# Patient Record
Sex: FEMALE | Race: WHITE | NOT HISPANIC OR LATINO | Employment: OTHER | URBAN - METROPOLITAN AREA
[De-identification: names, ages, dates, MRNs, and addresses within clinical notes are randomized per-mention and may not be internally consistent; named-entity substitution may affect disease eponyms.]

---

## 2017-04-04 ENCOUNTER — OFFICE VISIT (OUTPATIENT)
Dept: FAMILY MEDICINE CLINIC | Facility: CLINIC | Age: 72
End: 2017-04-04

## 2017-04-04 VITALS
OXYGEN SATURATION: 96 % | BODY MASS INDEX: 26.13 KG/M2 | SYSTOLIC BLOOD PRESSURE: 140 MMHG | HEART RATE: 84 BPM | DIASTOLIC BLOOD PRESSURE: 70 MMHG | WEIGHT: 157 LBS

## 2017-04-04 DIAGNOSIS — N30.01 ACUTE CYSTITIS WITH HEMATURIA: ICD-10-CM

## 2017-04-04 DIAGNOSIS — R35.0 URINARY FREQUENCY: Primary | ICD-10-CM

## 2017-04-04 LAB
BILIRUB BLD-MCNC: NEGATIVE MG/DL
CLARITY, POC: ABNORMAL
COLOR UR: YELLOW
GLUCOSE UR STRIP-MCNC: NEGATIVE MG/DL
KETONES UR QL: NEGATIVE
LEUKOCYTE EST, POC: ABNORMAL
NITRITE UR-MCNC: NEGATIVE MG/ML
PH UR: 5 [PH] (ref 5–8)
PROT UR STRIP-MCNC: ABNORMAL MG/DL
RBC # UR STRIP: NEGATIVE /UL
SP GR UR: 1.01 (ref 1–1.03)
UROBILINOGEN UR QL: NORMAL

## 2017-04-04 PROCEDURE — 81002 URINALYSIS NONAUTO W/O SCOPE: CPT | Performed by: NURSE PRACTITIONER

## 2017-04-04 PROCEDURE — 99213 OFFICE O/P EST LOW 20 MIN: CPT | Performed by: NURSE PRACTITIONER

## 2017-04-04 RX ORDER — ECHINACEA PURPUREA EXTRACT 125 MG
TABLET ORAL
COMMUNITY
Start: 2017-03-11 | End: 2017-07-03

## 2017-04-04 RX ORDER — PHENAZOPYRIDINE HYDROCHLORIDE 100 MG/1
100 TABLET, FILM COATED ORAL 3 TIMES DAILY PRN
Qty: 6 TABLET | Refills: 0 | Status: SHIPPED | OUTPATIENT
Start: 2017-04-04 | End: 2017-04-26

## 2017-04-04 RX ORDER — PHENAZOPYRIDINE HYDROCHLORIDE 95 MG/1
TABLET ORAL
COMMUNITY
Start: 2017-03-11 | End: 2017-04-04

## 2017-04-04 RX ORDER — CA/D3/MAG OX/ZINC/COP/MANG/BOR 600 MG-800
1 TABLET,CHEWABLE ORAL DAILY
COMMUNITY

## 2017-04-04 NOTE — PROGRESS NOTES
Subjective   Gloria Álvarez is a 71 y.o. female.   Pt of Dr Ravi.    Chief Complaint   Patient presents with   • Urinary Tract Infection     Social History   Substance Use Topics   • Smoking status: Never Smoker   • Smokeless tobacco: None   • Alcohol use No       History of Present Illness   Here today for pelvic pain and follow up from [facility] for UTI.  Pelvic pain for several years, is intermittent and sharp, nothing will alleviate it.Has been seen by urology in the past.  Uti with dysuria and frequency that started over the weekend. The pain was intermittent and increased water intake helped alleviate the pain some.    Review of Systems   Genitourinary: Positive for frequency and pelvic pain.   All other systems reviewed and are negative.      Objective   Vitals:    04/04/17 1535   BP: 140/70   Pulse: 84   SpO2: 96%   Weight: 157 lb (71.2 kg)     Body mass index is 26.13 kg/(m^2).    Physical Exam   Constitutional: She appears well-developed and well-nourished. No distress.   HENT:   Head: Atraumatic.   Eyes: EOM are normal.   Cardiovascular: Normal rate and regular rhythm.    Pulmonary/Chest: Effort normal.   Abdominal: Soft. Bowel sounds are normal.   Mild tenderness to bilateral pelvic aspect.   Skin: Skin is warm.   Nursing note and vitals reviewed.    Urine clear today, discussed follow up with uro/gyn for her pelvic pain and she is in agreement.  Assessment/Plan   Problem List Items Addressed This Visit     None      Visit Diagnoses     Urinary frequency    -  Primary    Relevant Orders    POC Urinalysis Dipstick, Multipro

## 2017-05-02 ENCOUNTER — OFFICE VISIT (OUTPATIENT)
Dept: GASTROENTEROLOGY | Facility: CLINIC | Age: 72
End: 2017-05-02

## 2017-05-02 VITALS — HEIGHT: 64 IN | WEIGHT: 153.8 LBS | BODY MASS INDEX: 26.26 KG/M2

## 2017-05-02 DIAGNOSIS — K57.32 DIVERTICULITIS OF LARGE INTESTINE WITHOUT PERFORATION OR ABSCESS WITHOUT BLEEDING: ICD-10-CM

## 2017-05-02 DIAGNOSIS — D3A.026 CARCINOID TUMOR OF RECTUM: Primary | ICD-10-CM

## 2017-05-02 PROCEDURE — 99203 OFFICE O/P NEW LOW 30 MIN: CPT | Performed by: INTERNAL MEDICINE

## 2017-06-28 ENCOUNTER — TRANSCRIBE ORDERS (OUTPATIENT)
Dept: ADMINISTRATIVE | Facility: HOSPITAL | Age: 72
End: 2017-06-28

## 2017-06-28 DIAGNOSIS — C73 THYROID CANCER (HCC): Primary | ICD-10-CM

## 2017-07-05 ENCOUNTER — ANESTHESIA EVENT (OUTPATIENT)
Dept: GASTROENTEROLOGY | Facility: HOSPITAL | Age: 72
End: 2017-07-05

## 2017-07-05 ENCOUNTER — APPOINTMENT (OUTPATIENT)
Dept: ULTRASOUND IMAGING | Facility: HOSPITAL | Age: 72
End: 2017-07-05

## 2017-07-05 ENCOUNTER — ANESTHESIA (OUTPATIENT)
Dept: GASTROENTEROLOGY | Facility: HOSPITAL | Age: 72
End: 2017-07-05

## 2017-07-05 ENCOUNTER — HOSPITAL ENCOUNTER (OUTPATIENT)
Facility: HOSPITAL | Age: 72
Setting detail: HOSPITAL OUTPATIENT SURGERY
Discharge: HOME | End: 2017-07-05
Attending: INTERNAL MEDICINE | Admitting: INTERNAL MEDICINE

## 2017-07-05 VITALS
RESPIRATION RATE: 16 BRPM | HEART RATE: 74 BPM | DIASTOLIC BLOOD PRESSURE: 67 MMHG | TEMPERATURE: 97.9 F | BODY MASS INDEX: 24.73 KG/M2 | SYSTOLIC BLOOD PRESSURE: 126 MMHG | WEIGHT: 148.44 LBS | OXYGEN SATURATION: 97 % | HEIGHT: 65 IN

## 2017-07-05 PROBLEM — Z86.012 HX OF BENIGN CARCINOID TUMOR: Status: ACTIVE | Noted: 2017-07-05

## 2017-07-05 PROBLEM — Z87.19 HX OF DIVERTICULITIS OF COLON: Status: ACTIVE | Noted: 2017-07-05

## 2017-07-05 PROCEDURE — 25010000002 PROPOFOL 1000 MG/ML EMULSION: Performed by: ANESTHESIOLOGY

## 2017-07-05 PROCEDURE — 45378 DIAGNOSTIC COLONOSCOPY: CPT | Performed by: INTERNAL MEDICINE

## 2017-07-05 PROCEDURE — S0260 H&P FOR SURGERY: HCPCS | Performed by: INTERNAL MEDICINE

## 2017-07-05 PROCEDURE — 25010000002 PROPOFOL 10 MG/ML EMULSION: Performed by: ANESTHESIOLOGY

## 2017-07-05 RX ORDER — SODIUM CHLORIDE, SODIUM LACTATE, POTASSIUM CHLORIDE, CALCIUM CHLORIDE 600; 310; 30; 20 MG/100ML; MG/100ML; MG/100ML; MG/100ML
30 INJECTION, SOLUTION INTRAVENOUS CONTINUOUS PRN
Status: DISCONTINUED | OUTPATIENT
Start: 2017-07-05 | End: 2017-07-05 | Stop reason: HOSPADM

## 2017-07-05 RX ORDER — LIDOCAINE HYDROCHLORIDE 10 MG/ML
0.1 INJECTION, SOLUTION EPIDURAL; INFILTRATION; INTRACAUDAL; PERINEURAL ONCE AS NEEDED
Status: COMPLETED | OUTPATIENT
Start: 2017-07-05 | End: 2017-07-05

## 2017-07-05 RX ORDER — LIDOCAINE HYDROCHLORIDE 20 MG/ML
INJECTION, SOLUTION INFILTRATION; PERINEURAL AS NEEDED
Status: DISCONTINUED | OUTPATIENT
Start: 2017-07-05 | End: 2017-07-05 | Stop reason: SURG

## 2017-07-05 RX ORDER — PROPOFOL 10 MG/ML
VIAL (ML) INTRAVENOUS AS NEEDED
Status: DISCONTINUED | OUTPATIENT
Start: 2017-07-05 | End: 2017-07-05 | Stop reason: SURG

## 2017-07-05 RX ADMIN — LIDOCAINE HYDROCHLORIDE 40 MG: 20 INJECTION, SOLUTION INFILTRATION; PERINEURAL at 08:54

## 2017-07-05 RX ADMIN — PROPOFOL 40 MG: 10 INJECTION, EMULSION INTRAVENOUS at 09:04

## 2017-07-05 RX ADMIN — SODIUM CHLORIDE, POTASSIUM CHLORIDE, SODIUM LACTATE AND CALCIUM CHLORIDE 30 ML/HR: 600; 310; 30; 20 INJECTION, SOLUTION INTRAVENOUS at 07:36

## 2017-07-05 RX ADMIN — LIDOCAINE HYDROCHLORIDE 0.1 ML: 10 INJECTION, SOLUTION EPIDURAL; INFILTRATION; INTRACAUDAL; PERINEURAL at 07:37

## 2017-07-05 RX ADMIN — PROPOFOL 140 MG: 10 INJECTION, EMULSION INTRAVENOUS at 08:54

## 2017-07-05 RX ADMIN — PROPOFOL 140 MCG/KG/MIN: 10 INJECTION, EMULSION INTRAVENOUS at 08:54

## 2017-07-05 NOTE — DISCHARGE INSTRUCTIONS
For the next 24 hours patient needs to be with a responsible adult.    For 24 hours DO NOT drive, operate machinery, appliances, drink alcohol, make important decisions or sign legal documents.    Start with a light or bland diet and advance to regular diet as tolerated.    Follow recommendations on procedure report provided by your doctor.    Call Dr Lambert for problems [phone]    Problems may include but not limited to: large amounts of bleeding, trouble breathing, repeated vomiting, severe unrelieved pain, fever or chills.

## 2017-07-05 NOTE — ANESTHESIA POSTPROCEDURE EVALUATION
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

## 2017-07-05 NOTE — PLAN OF CARE
Problem: Patient Care Overview (Adult)  Goal: Plan of Care Review  Outcome: Ongoing (interventions implemented as appropriate)    07/05/17 0703   Coping/Psychosocial Response Interventions   Plan Of Care Reviewed With patient       Goal: Adult Individualization and Mutuality  Outcome: Ongoing (interventions implemented as appropriate)    07/05/17 0703   Individualization   Patient Specific Preferences none       Goal: Discharge Needs Assessment  Outcome: Ongoing (interventions implemented as appropriate)    07/05/17 0703   Discharge Needs Assessment   Concerns To Be Addressed no discharge needs identified   Readmission Within The Last 30 Days no previous admission in last 30 days   Equipment Needed After Discharge none   Current Health   Anticipated Changes Related to Illness none   Living Environment   Transportation Available car;family or friend will provide         Problem: GI Endoscopy (Adult)  Goal: Signs and Symptoms of Listed Potential Problems Will be Absent or Manageable (GI Endoscopy)  Outcome: Ongoing (interventions implemented as appropriate)    07/05/17 0703   GI Endoscopy   Problems Assessed (GI Endoscopy) bleeding;pain

## 2017-07-05 NOTE — H&P
[facility]  Pre Procedure History & Physical    Chief Complaint:   History diverticulitis    Subjective     HPI:    71-year-old female with history of carcinoma of the thyroid as well as carcinoid of the rectum presenting status post acute diverticulitis.  Patient now here for follow-up.  Patient last colonoscopy 2012.  He shouldn't here for colonoscopy surveillance.    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   • Osteoporosis    • Papillary carcinoma of thyroid 2012   • Personal history of asthma        Past Surgical History:  Past Surgical History:   Procedure Laterality Date   • CATARACT EXTRACTION Bilateral    • CHOLECYSTECTOMY  2003   • COLONOSCOPY  04/2012    complete   • RECTAL TUMOR BY PROCTOTOMY EXCISION      CARCINOID   • TOTAL THYROIDECTOMY      7/3/12 ESTEFANIA / DR DEE/ 3CM PAP MET 1/9 NODULE RIGHT LOBE / LEFT LOBE -       Family History:  Family History   Problem Relation Age of Onset   • Hypertension Mother    • Hypertension Brother    • Malig Hyperthermia Neg Hx        Social History:   reports that she has never smoked. She has never used smokeless tobacco. She reports that she does not drink alcohol or use illicit drugs.    Medications:   Prescriptions Prior to Admission   Medication Sig Dispense Refill Last Dose   • levothyroxine (SYNTHROID, LEVOTHROID) 150 MCG tablet Take 150 mcg by mouth Daily.   7/4/2017 at Unknown time   • SENNA LAXATIVE 25 MG tablet Take 1 tablet by mouth As Needed.   Past Month at Unknown time   • aspirin 81 MG tablet Take 1 tablet by mouth daily.   6/27/2017   • Calcium Carbonate-Vit D-Min (CALTRATE 600+D PLUS MINERALS) 600-800 MG-UNIT chewable tablet Chew 1 tablet Daily.   6/28/2017   • Lutein 6 MG capsule Take 6 mg by mouth Daily. Take as directed.   6/28/2017   • vitamin E 400 UNIT capsule Take 400  "Units by mouth Daily.   6/28/2017       Allergies:  Ciprofloxacin; Sulfa antibiotics; Varicella virus vaccine live; and Zoster vaccine live    ROS:    Pertinent items are noted in HPI     Objective     Blood pressure 148/73, pulse 87, temperature 97.9 °F (36.6 °C), temperature source Oral, resp. rate 16, height 65\" (165.1 cm), weight 148 lb 7 oz (67.3 kg), SpO2 100 %.    Physical Exam   Constitutional: Pt is oriented to person, place, and time and well-developed, well-nourished, and in no distress.   Mouth/Throat: Oropharynx is clear and moist.   Neck: Normal range of motion.   Cardiovascular: Normal rate, regular rhythm and normal heart sounds.    Pulmonary/Chest: Effort normal and breath sounds normal.   Abdominal: Soft. Nontender  Skin: Skin is warm and dry.   Psychiatric: Mood, memory, affect and judgment normal.     Assessment/Plan     Diagnosis:  History diverticulitis  History carcinoid of the rectum    Anticipated Surgical Procedure:  Colonoscopy    The risks, benefits, and alternatives of this procedure have been discussed with the patient or the responsible party- the patient understands and agrees to proceed.                                                          "

## 2017-07-05 NOTE — ANESTHESIA PREPROCEDURE EVALUATION
Anesthesia Evaluation     Patient summary reviewed   NPO Solid Status: > 6 hours  NPO Liquid Status: > 6 hours     Airway   Mallampati: II  TM distance: >3 FB  Dental      Pulmonary    Cardiovascular     Rhythm: regular  Rate: normal        Neuro/Psych  GI/Hepatic/Renal/Endo    (+)  hypothyroidism,     Musculoskeletal     Abdominal    Substance History      OB/GYN          Other      history of cancer remission                                    Anesthesia Plan    ASA 2     MAC     Anesthetic plan and risks discussed with patient.

## 2017-07-05 NOTE — H&P
"[facility]  Pre Procedure History & Physical    Chief Complaint:   Time for my colonoscopy    Subjective     HPI:   71 y.o. 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 She has never used smokeless tobacco. She reports that she does not drink alcohol or use illicit drugs.    Medications:   Prescriptions Prior to Admission   Medication Sig Dispense Refill Last Dose   • levothyroxine (SYNTHROID, LEVOTHROID) 150 MCG tablet Take 150 mcg by mouth Daily.   7/4/2017 at Unknown time   • SENNA LAXATIVE 25 MG tablet Take 1 tablet by mouth As Needed.   Past Month at Unknown time   • aspirin 81 MG tablet Take 1 tablet by mouth daily.   6/27/2017   • Calcium Carbonate-Vit D-Min (CALTRATE 600+D PLUS MINERALS) 600-800 MG-UNIT chewable tablet Chew 1 tablet Daily.   6/28/2017   • Lutein 6 MG capsule Take 6 mg by mouth Daily. Take as directed.   6/28/2017   • vitamin E 400 UNIT capsule Take 400 Units by mouth Daily.   6/28/2017       Allergies:  Ciprofloxacin; Sulfa antibiotics; Varicella virus vaccine live; and Zoster vaccine live    ROS:    Pertinent items are noted in HPI     Objective     Blood pressure 148/73, pulse 87, temperature 97.9 °F (36.6 °C), temperature source Oral, resp. rate 16, height 65\" (165.1 cm), weight 148 lb 7 oz (67.3 kg), SpO2 100 %.    Physical Exam   Constitutional: Pt is oriented to person, place, and time and well-developed, well-nourished, and in no distress.   HENT:   Mouth/Throat: Oropharynx is clear and moist.   Neck: Normal " range of motion. Neck supple.   Cardiovascular: Normal rate, regular rhythm and normal heart sounds.    Pulmonary/Chest: Effort normal and breath sounds normal. No respiratory distress. No  wheezes.   Abdominal: Soft. Bowel sounds are normal.   Skin: Skin is warm and dry.   Psychiatric: Mood, memory, affect and judgment normal.     Assessment/Plan     Diagnosis:  Encounter for screening for colon cancer    Anticipated Surgical Procedure:  Colonoscopy    The risks, benefits, and alternatives of this procedure have been discussed with the patient or the responsible party- the patient understands and agrees to proceed.

## 2017-07-05 NOTE — BRIEF OP NOTE
COLONOSCOPY  Procedure Note    Gloria Álvarez  7/5/2017    Pre-op Diagnosis:   Diverticulitis of large intestine without perforation or abscess without bleeding [K57.32]  Carcinoid tumor of rectum [D3A.026]    Post-op Diagnosis:     Post-Op Diagnosis Codes:     * Diverticulosis [K57.90]     * Internal hemorrhoids [K64.8]    Procedure/CPT® Codes:      Procedure(s):  COLONOSCOPY TO CECUM    Surgeon(s):  Andres Lambert MD    Anesthesia: Monitor Anesthesia Care    Staff:   Endo Technician: Erma Schultz  Endo Nurse: Nico Ramírez RN    Estimated Blood Loss: *No blood loss documented*  Urine Voided: * No values recorded between 7/5/2017  8:46 AM and 7/5/2017  9:20 AM *    Specimens:                * No specimens in log *      Drains:    na       Findings: Diverticulosis  Internal hemorrhoids  History rectosigmoid resection    Complications: None      Andres Lambert MD     Date: 7/5/2017  Time: 9:21 AM

## 2017-07-06 ENCOUNTER — HOSPITAL ENCOUNTER (OUTPATIENT)
Dept: ULTRASOUND IMAGING | Facility: HOSPITAL | Age: 72
Discharge: HOME | End: 2017-07-06
Admitting: INTERNAL MEDICINE

## 2017-07-06 DIAGNOSIS — C73 THYROID CANCER (HCC): ICD-10-CM

## 2017-07-06 PROCEDURE — 76536 US EXAM OF HEAD AND NECK: CPT

## 2017-11-03 ENCOUNTER — APPOINTMENT (OUTPATIENT)
Dept: WOMENS IMAGING | Facility: HOSPITAL | Age: 72
End: 2017-11-03

## 2017-11-03 PROCEDURE — 77080 DXA BONE DENSITY AXIAL: CPT | Performed by: RADIOLOGY

## 2017-11-03 PROCEDURE — 77063 BREAST TOMOSYNTHESIS BI: CPT | Performed by: RADIOLOGY

## 2017-11-03 PROCEDURE — G0202 SCR MAMMO BI INCL CAD: HCPCS | Performed by: RADIOLOGY

## 2017-11-10 PROBLEM — M81.0 AGE-RELATED OSTEOPOROSIS WITHOUT CURRENT PATHOLOGICAL FRACTURE: Status: ACTIVE | Noted: 2017-11-10

## 2017-12-26 ENCOUNTER — APPOINTMENT (OUTPATIENT)
Dept: GENERAL RADIOLOGY | Facility: HOSPITAL | Age: 72
End: 2017-12-26

## 2017-12-26 ENCOUNTER — HOSPITAL ENCOUNTER (INPATIENT)
Facility: HOSPITAL | Age: 72
LOS: 2 days | Discharge: HOME HEALTH | End: 2017-12-28
Attending: EMERGENCY MEDICINE | Admitting: INTERNAL MEDICINE

## 2017-12-26 DIAGNOSIS — R26.2 DIFFICULTY WALKING: ICD-10-CM
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
## 2017-12-26 DIAGNOSIS — S72.012A CLOSED SUBCAPITAL FRACTURE OF LEFT FEMUR, INITIAL ENCOUNTER (HCC): Primary | ICD-10-CM

## 2017-12-26 PROBLEM — E87.6 HYPOKALEMIA: Status: ACTIVE | Noted: 2017-12-26

## 2017-12-26 LAB
ALBUMIN SERPL-MCNC: 4.1 G/DL (ref 3.5–5.2)
ALBUMIN/GLOB SERPL: 1.2 G/DL
ALP SERPL-CCNC: 90 U/L (ref 39–117)
ALT SERPL W P-5'-P-CCNC: 15 U/L (ref 1–33)
ANION GAP SERPL CALCULATED.3IONS-SCNC: 12.8 MMOL/L
APTT PPP: 28.8 SECONDS (ref 22.7–35.4)
AST SERPL-CCNC: 16 U/L (ref 1–32)
BASOPHILS # BLD AUTO: 0.02 10*3/MM3 (ref 0–0.2)
BASOPHILS NFR BLD AUTO: 0.1 % (ref 0–1.5)
BILIRUB SERPL-MCNC: 0.4 MG/DL (ref 0.1–1.2)
BUN BLD-MCNC: 12 MG/DL (ref 8–23)
BUN/CREAT SERPL: 15.4 (ref 7–25)
CALCIUM SPEC-SCNC: 9 MG/DL (ref 8.6–10.5)
CHLORIDE SERPL-SCNC: 103 MMOL/L (ref 98–107)
CO2 SERPL-SCNC: 24.2 MMOL/L (ref 22–29)
CREAT BLD-MCNC: 0.78 MG/DL (ref 0.57–1)
DEPRECATED RDW RBC AUTO: 40 FL (ref 37–54)
EOSINOPHIL # BLD AUTO: 0.03 10*3/MM3 (ref 0–0.7)
EOSINOPHIL NFR BLD AUTO: 0.2 % (ref 0.3–6.2)
ERYTHROCYTE [DISTWIDTH] IN BLOOD BY AUTOMATED COUNT: 12.3 % (ref 11.7–13)
GFR SERPL CREATININE-BSD FRML MDRD: 72 ML/MIN/1.73
GLOBULIN UR ELPH-MCNC: 3.4 GM/DL
GLUCOSE BLD-MCNC: 98 MG/DL (ref 65–99)
HCT VFR BLD AUTO: 40.2 % (ref 35.6–45.5)
HGB BLD-MCNC: 13.4 G/DL (ref 11.9–15.5)
IMM GRANULOCYTES # BLD: 0.08 10*3/MM3 (ref 0–0.03)
IMM GRANULOCYTES NFR BLD: 0.5 % (ref 0–0.5)
INR PPP: 1.02 (ref 0.9–1.1)
LYMPHOCYTES # BLD AUTO: 1.43 10*3/MM3 (ref 0.9–4.8)
LYMPHOCYTES NFR BLD AUTO: 8 % (ref 19.6–45.3)
MCH RBC QN AUTO: 29.9 PG (ref 26.9–32)
MCHC RBC AUTO-ENTMCNC: 33.3 G/DL (ref 32.4–36.3)
MCV RBC AUTO: 89.7 FL (ref 80.5–98.2)
MONOCYTES # BLD AUTO: 0.97 10*3/MM3 (ref 0.2–1.2)
MONOCYTES NFR BLD AUTO: 5.5 % (ref 5–12)
NEUTROPHILS # BLD AUTO: 15.24 10*3/MM3 (ref 1.9–8.1)
NEUTROPHILS NFR BLD AUTO: 85.7 % (ref 42.7–76)
PLATELET # BLD AUTO: 264 10*3/MM3 (ref 140–500)
PMV BLD AUTO: 10 FL (ref 6–12)
POTASSIUM BLD-SCNC: 3.4 MMOL/L (ref 3.5–5.2)
PROT SERPL-MCNC: 7.5 G/DL (ref 6–8.5)
PROTHROMBIN TIME: 13 SECONDS (ref 11.7–14.2)
RBC # BLD AUTO: 4.48 10*6/MM3 (ref 3.9–5.2)
SODIUM BLD-SCNC: 140 MMOL/L (ref 136–145)
WBC NRBC COR # BLD: 17.77 10*3/MM3 (ref 4.5–10.7)

## 2017-12-26 PROCEDURE — 85610 PROTHROMBIN TIME: CPT | Performed by: EMERGENCY MEDICINE

## 2017-12-26 PROCEDURE — 25010000002 HYDROMORPHONE PER 4 MG: Performed by: EMERGENCY MEDICINE

## 2017-12-26 PROCEDURE — 25810000003 SODIUM CHLORIDE 0.9 % WITH KCL 20 MEQ 20-0.9 MEQ/L-% SOLUTION: Performed by: INTERNAL MEDICINE

## 2017-12-26 PROCEDURE — 25010000002 ONDANSETRON PER 1 MG: Performed by: EMERGENCY MEDICINE

## 2017-12-26 PROCEDURE — 99284 EMERGENCY DEPT VISIT MOD MDM: CPT

## 2017-12-26 PROCEDURE — 85025 COMPLETE CBC W/AUTO DIFF WBC: CPT | Performed by: EMERGENCY MEDICINE

## 2017-12-26 PROCEDURE — 85730 THROMBOPLASTIN TIME PARTIAL: CPT | Performed by: EMERGENCY MEDICINE

## 2017-12-26 PROCEDURE — 80053 COMPREHEN METABOLIC PANEL: CPT | Performed by: EMERGENCY MEDICINE

## 2017-12-26 PROCEDURE — 73502 X-RAY EXAM HIP UNI 2-3 VIEWS: CPT

## 2017-12-26 PROCEDURE — 93005 ELECTROCARDIOGRAM TRACING: CPT | Performed by: EMERGENCY MEDICINE

## 2017-12-26 PROCEDURE — 71010 HC CHEST PA OR AP: CPT

## 2017-12-26 PROCEDURE — 93010 ELECTROCARDIOGRAM REPORT: CPT | Performed by: INTERNAL MEDICINE

## 2017-12-26 RX ORDER — HYDROCODONE BITARTRATE AND ACETAMINOPHEN 7.5; 325 MG/1; MG/1
1 TABLET ORAL EVERY 4 HOURS PRN
Status: DISCONTINUED | OUTPATIENT
Start: 2017-12-26 | End: 2017-12-27 | Stop reason: HOSPADM

## 2017-12-26 RX ORDER — SENNOSIDES 8.6 MG
1 TABLET ORAL 2 TIMES DAILY
Status: DISCONTINUED | OUTPATIENT
Start: 2017-12-26 | End: 2017-12-27 | Stop reason: HOSPADM

## 2017-12-26 RX ORDER — LEVOTHYROXINE SODIUM 0.15 MG/1
150 TABLET ORAL EVERY MORNING
Status: DISCONTINUED | OUTPATIENT
Start: 2017-12-27 | End: 2017-12-27 | Stop reason: HOSPADM

## 2017-12-26 RX ORDER — VITAMIN E 268 MG
400 CAPSULE ORAL DAILY
Status: DISCONTINUED | OUTPATIENT
Start: 2017-12-26 | End: 2017-12-27 | Stop reason: HOSPADM

## 2017-12-26 RX ORDER — SODIUM CHLORIDE 0.9 % (FLUSH) 0.9 %
1-10 SYRINGE (ML) INJECTION AS NEEDED
Status: DISCONTINUED | OUTPATIENT
Start: 2017-12-26 | End: 2017-12-27 | Stop reason: HOSPADM

## 2017-12-26 RX ORDER — ACETAMINOPHEN 325 MG/1
650 TABLET ORAL EVERY 4 HOURS PRN
Status: DISCONTINUED | OUTPATIENT
Start: 2017-12-26 | End: 2017-12-27 | Stop reason: HOSPADM

## 2017-12-26 RX ORDER — SODIUM CHLORIDE 0.9 % (FLUSH) 0.9 %
10 SYRINGE (ML) INJECTION AS NEEDED
Status: DISCONTINUED | OUTPATIENT
Start: 2017-12-26 | End: 2017-12-27 | Stop reason: HOSPADM

## 2017-12-26 RX ORDER — SODIUM CHLORIDE AND POTASSIUM CHLORIDE 150; 900 MG/100ML; MG/100ML
75 INJECTION, SOLUTION INTRAVENOUS CONTINUOUS
Status: DISCONTINUED | OUTPATIENT
Start: 2017-12-26 | End: 2017-12-27 | Stop reason: HOSPADM

## 2017-12-26 RX ORDER — ONDANSETRON 2 MG/ML
4 INJECTION INTRAMUSCULAR; INTRAVENOUS EVERY 6 HOURS PRN
Status: DISCONTINUED | OUTPATIENT
Start: 2017-12-26 | End: 2017-12-27 | Stop reason: HOSPADM

## 2017-12-26 RX ORDER — HYDROMORPHONE HYDROCHLORIDE 1 MG/ML
0.5 INJECTION, SOLUTION INTRAMUSCULAR; INTRAVENOUS; SUBCUTANEOUS
Status: DISCONTINUED | OUTPATIENT
Start: 2017-12-26 | End: 2017-12-27 | Stop reason: HOSPADM

## 2017-12-26 RX ORDER — ONDANSETRON 2 MG/ML
4 INJECTION INTRAMUSCULAR; INTRAVENOUS ONCE
Status: COMPLETED | OUTPATIENT
Start: 2017-12-26 | End: 2017-12-26

## 2017-12-26 RX ADMIN — POTASSIUM CHLORIDE AND SODIUM CHLORIDE 75 ML/HR: 900; 150 INJECTION, SOLUTION INTRAVENOUS at 22:08

## 2017-12-26 RX ADMIN — HYDROCODONE BITARTRATE AND ACETAMINOPHEN 1 TABLET: 7.5; 325 TABLET ORAL at 22:07

## 2017-12-26 RX ADMIN — HYDROMORPHONE HYDROCHLORIDE 1 MG: 2 INJECTION INTRAMUSCULAR; INTRAVENOUS; SUBCUTANEOUS at 16:11

## 2017-12-26 RX ADMIN — ONDANSETRON 4 MG: 2 INJECTION INTRAMUSCULAR; INTRAVENOUS at 16:11

## 2017-12-27 ENCOUNTER — APPOINTMENT (OUTPATIENT)
Dept: GENERAL RADIOLOGY | Facility: HOSPITAL | Age: 72
End: 2017-12-27

## 2017-12-27 ENCOUNTER — ANESTHESIA EVENT (OUTPATIENT)
Dept: PERIOP | Facility: HOSPITAL | Age: 72
End: 2017-12-27

## 2017-12-27 ENCOUNTER — ANESTHESIA (OUTPATIENT)
Dept: PERIOP | Facility: HOSPITAL | Age: 72
End: 2017-12-27

## 2017-12-27 LAB
ALBUMIN SERPL-MCNC: 3.3 G/DL (ref 3.5–5.2)
ALBUMIN/GLOB SERPL: 1 G/DL
ALP SERPL-CCNC: 75 U/L (ref 39–117)
ALT SERPL W P-5'-P-CCNC: 19 U/L (ref 1–33)
ANION GAP SERPL CALCULATED.3IONS-SCNC: 11.4 MMOL/L
AST SERPL-CCNC: 25 U/L (ref 1–32)
BACTERIA UR QL AUTO: ABNORMAL /HPF
BASOPHILS # BLD AUTO: 0.02 10*3/MM3 (ref 0–0.2)
BASOPHILS NFR BLD AUTO: 0.2 % (ref 0–1.5)
BILIRUB SERPL-MCNC: 0.7 MG/DL (ref 0.1–1.2)
BILIRUB UR QL STRIP: NEGATIVE
BUN BLD-MCNC: 11 MG/DL (ref 8–23)
BUN/CREAT SERPL: 13.1 (ref 7–25)
CALCIUM SPEC-SCNC: 8.1 MG/DL (ref 8.6–10.5)
CHLORIDE SERPL-SCNC: 105 MMOL/L (ref 98–107)
CLARITY UR: CLEAR
CO2 SERPL-SCNC: 23.6 MMOL/L (ref 22–29)
COLOR UR: YELLOW
CREAT BLD-MCNC: 0.84 MG/DL (ref 0.57–1)
DEPRECATED RDW RBC AUTO: 41.5 FL (ref 37–54)
EOSINOPHIL # BLD AUTO: 0.06 10*3/MM3 (ref 0–0.7)
EOSINOPHIL NFR BLD AUTO: 0.5 % (ref 0.3–6.2)
ERYTHROCYTE [DISTWIDTH] IN BLOOD BY AUTOMATED COUNT: 12.3 % (ref 11.7–13)
GFR SERPL CREATININE-BSD FRML MDRD: 66 ML/MIN/1.73
GLOBULIN UR ELPH-MCNC: 3.3 GM/DL
GLUCOSE BLD-MCNC: 99 MG/DL (ref 65–99)
GLUCOSE UR STRIP-MCNC: NEGATIVE MG/DL
HCT VFR BLD AUTO: 36.8 % (ref 35.6–45.5)
HGB BLD-MCNC: 12.2 G/DL (ref 11.9–15.5)
HGB UR QL STRIP.AUTO: NEGATIVE
HYALINE CASTS UR QL AUTO: ABNORMAL /LPF
IMM GRANULOCYTES # BLD: 0.02 10*3/MM3 (ref 0–0.03)
IMM GRANULOCYTES NFR BLD: 0.2 % (ref 0–0.5)
KETONES UR QL STRIP: NEGATIVE
LEUKOCYTE ESTERASE UR QL STRIP.AUTO: ABNORMAL
LYMPHOCYTES # BLD AUTO: 1.87 10*3/MM3 (ref 0.9–4.8)
LYMPHOCYTES NFR BLD AUTO: 16.5 % (ref 19.6–45.3)
MAGNESIUM SERPL-MCNC: 2.3 MG/DL (ref 1.6–2.4)
MCH RBC QN AUTO: 30.5 PG (ref 26.9–32)
MCHC RBC AUTO-ENTMCNC: 33.2 G/DL (ref 32.4–36.3)
MCV RBC AUTO: 92 FL (ref 80.5–98.2)
MONOCYTES # BLD AUTO: 1.53 10*3/MM3 (ref 0.2–1.2)
MONOCYTES NFR BLD AUTO: 13.5 % (ref 5–12)
NEUTROPHILS # BLD AUTO: 7.86 10*3/MM3 (ref 1.9–8.1)
NEUTROPHILS NFR BLD AUTO: 69.1 % (ref 42.7–76)
NITRITE UR QL STRIP: NEGATIVE
PH UR STRIP.AUTO: 6 [PH] (ref 5–8)
PLATELET # BLD AUTO: 226 10*3/MM3 (ref 140–500)
PMV BLD AUTO: 10 FL (ref 6–12)
POTASSIUM BLD-SCNC: 3.8 MMOL/L (ref 3.5–5.2)
PROT SERPL-MCNC: 6.6 G/DL (ref 6–8.5)
PROT UR QL STRIP: NEGATIVE
RBC # BLD AUTO: 4 10*6/MM3 (ref 3.9–5.2)
RBC # UR: ABNORMAL /HPF
REF LAB TEST METHOD: ABNORMAL
SODIUM BLD-SCNC: 140 MMOL/L (ref 136–145)
SP GR UR STRIP: 1.01 (ref 1–1.03)
SQUAMOUS #/AREA URNS HPF: ABNORMAL /HPF
UROBILINOGEN UR QL STRIP: ABNORMAL
WBC NRBC COR # BLD: 11.36 10*3/MM3 (ref 4.5–10.7)
WBC UR QL AUTO: ABNORMAL /HPF

## 2017-12-27 PROCEDURE — 83735 ASSAY OF MAGNESIUM: CPT | Performed by: INTERNAL MEDICINE

## 2017-12-27 PROCEDURE — 25010000002 ROPIVACAINE PER 1 MG: Performed by: ORTHOPAEDIC SURGERY

## 2017-12-27 PROCEDURE — 25010000003 CEFAZOLIN IN DEXTROSE 2-4 GM/100ML-% SOLUTION: Performed by: ORTHOPAEDIC SURGERY

## 2017-12-27 PROCEDURE — 25010000002 DEXAMETHASONE PER 1 MG: Performed by: NURSE ANESTHETIST, CERTIFIED REGISTERED

## 2017-12-27 PROCEDURE — C1776 JOINT DEVICE (IMPLANTABLE): HCPCS | Performed by: ORTHOPAEDIC SURGERY

## 2017-12-27 PROCEDURE — 25010000002 PROPOFOL 10 MG/ML EMULSION: Performed by: NURSE ANESTHETIST, CERTIFIED REGISTERED

## 2017-12-27 PROCEDURE — 73501 X-RAY EXAM HIP UNI 1 VIEW: CPT

## 2017-12-27 PROCEDURE — 85025 COMPLETE CBC W/AUTO DIFF WBC: CPT | Performed by: INTERNAL MEDICINE

## 2017-12-27 PROCEDURE — 25010000002 ONDANSETRON PER 1 MG: Performed by: NURSE ANESTHETIST, CERTIFIED REGISTERED

## 2017-12-27 PROCEDURE — 81001 URINALYSIS AUTO W/SCOPE: CPT | Performed by: INTERNAL MEDICINE

## 2017-12-27 PROCEDURE — 27130 TOTAL HIP ARTHROPLASTY: CPT | Performed by: ORTHOPAEDIC SURGERY

## 2017-12-27 PROCEDURE — 25010000002 EPINEPHRINE PER 0.1 MG: Performed by: ORTHOPAEDIC SURGERY

## 2017-12-27 PROCEDURE — 25010000002 MORPHINE (PF) 10 MG/ML SOLUTION 1 ML VIAL: Performed by: ORTHOPAEDIC SURGERY

## 2017-12-27 PROCEDURE — 25010000003 CEFAZOLIN IN DEXTROSE 2-4 GM/100ML-% SOLUTION: Performed by: NURSE ANESTHETIST, CERTIFIED REGISTERED

## 2017-12-27 PROCEDURE — 94799 UNLISTED PULMONARY SVC/PX: CPT

## 2017-12-27 PROCEDURE — 76000 FLUOROSCOPY <1 HR PHYS/QHP: CPT

## 2017-12-27 PROCEDURE — 80053 COMPREHEN METABOLIC PANEL: CPT | Performed by: INTERNAL MEDICINE

## 2017-12-27 PROCEDURE — 25010000002 SUCCINYLCHOLINE PER 20 MG: Performed by: NURSE ANESTHETIST, CERTIFIED REGISTERED

## 2017-12-27 PROCEDURE — 0SRB04A REPLACEMENT OF LEFT HIP JOINT WITH CERAMIC ON POLYETHYLENE SYNTHETIC SUBSTITUTE, UNCEMENTED, OPEN APPROACH: ICD-10-PCS | Performed by: ORTHOPAEDIC SURGERY

## 2017-12-27 PROCEDURE — 25010000002 FENTANYL CITRATE (PF) 100 MCG/2ML SOLUTION: Performed by: ANESTHESIOLOGY

## 2017-12-27 PROCEDURE — 25810000003 SODIUM CHLORIDE 0.9 % WITH KCL 20 MEQ 20-0.9 MEQ/L-% SOLUTION: Performed by: INTERNAL MEDICINE

## 2017-12-27 PROCEDURE — 25010000002 KETOROLAC TROMETHAMINE PER 15 MG: Performed by: ORTHOPAEDIC SURGERY

## 2017-12-27 DEVICE — POLARSTEM STANDARD NON-CEMENTED                                    WITH TI/HA 2
Type: IMPLANTABLE DEVICE | Site: TROCHANTER | Status: FUNCTIONAL
Brand: POLARSTEM

## 2017-12-27 DEVICE — IMPLANTABLE DEVICE: Type: IMPLANTABLE DEVICE | Site: ACETABULUM | Status: FUNCTIONAL

## 2017-12-27 DEVICE — OXINIUM FEMORAL HEAD 12/14 TAPER                                    32MM +4
Type: IMPLANTABLE DEVICE | Site: TROCHANTER | Status: FUNCTIONAL
Brand: OXINIUM

## 2017-12-27 DEVICE — REFLECTION SPHERICAL HEAD SCREW 30MM
Type: IMPLANTABLE DEVICE | Site: ACETABULUM | Status: FUNCTIONAL
Brand: REFLECTION

## 2017-12-27 DEVICE — REFLECTION SPHERICAL HEAD SCREW 25MM
Type: IMPLANTABLE DEVICE | Site: ACETABULUM | Status: FUNCTIONAL
Brand: REFLECTION

## 2017-12-27 DEVICE — R3 3 HOLE ACETABULAR SHELL 50MM
Type: IMPLANTABLE DEVICE | Site: ACETABULUM | Status: FUNCTIONAL
Brand: R3 ACETABULAR

## 2017-12-27 DEVICE — R3 0 DEGREE XLPE ACETABULAR LINER                                    32MM ID X OD 50MM
Type: IMPLANTABLE DEVICE | Site: ACETABULUM | Status: FUNCTIONAL
Brand: R3

## 2017-12-27 DEVICE — REFLECTION SPHERICAL HEAD SCREW 20MM
Type: IMPLANTABLE DEVICE | Site: ACETABULUM | Status: FUNCTIONAL
Brand: REFLECTION

## 2017-12-27 RX ORDER — SODIUM CHLORIDE 0.9 % (FLUSH) 0.9 %
1-10 SYRINGE (ML) INJECTION AS NEEDED
Status: DISCONTINUED | OUTPATIENT
Start: 2017-12-27 | End: 2017-12-27 | Stop reason: HOSPADM

## 2017-12-27 RX ORDER — CEFAZOLIN SODIUM 2 G/100ML
2 INJECTION, SOLUTION INTRAVENOUS EVERY 8 HOURS
Status: COMPLETED | OUTPATIENT
Start: 2017-12-27 | End: 2017-12-28

## 2017-12-27 RX ORDER — UREA 10 %
1 LOTION (ML) TOPICAL NIGHTLY PRN
Status: DISCONTINUED | OUTPATIENT
Start: 2017-12-27 | End: 2017-12-28 | Stop reason: HOSPADM

## 2017-12-27 RX ORDER — ONDANSETRON 4 MG/1
4 TABLET, FILM COATED ORAL EVERY 6 HOURS PRN
Status: DISCONTINUED | OUTPATIENT
Start: 2017-12-27 | End: 2017-12-28 | Stop reason: HOSPADM

## 2017-12-27 RX ORDER — PROMETHAZINE HYDROCHLORIDE 25 MG/ML
12.5 INJECTION, SOLUTION INTRAMUSCULAR; INTRAVENOUS ONCE AS NEEDED
Status: DISCONTINUED | OUTPATIENT
Start: 2017-12-27 | End: 2017-12-27 | Stop reason: HOSPADM

## 2017-12-27 RX ORDER — ONDANSETRON 2 MG/ML
4 INJECTION INTRAMUSCULAR; INTRAVENOUS EVERY 6 HOURS PRN
Status: DISCONTINUED | OUTPATIENT
Start: 2017-12-27 | End: 2017-12-28 | Stop reason: HOSPADM

## 2017-12-27 RX ORDER — TRANEXAMIC ACID 100 MG/ML
INJECTION, SOLUTION INTRAVENOUS AS NEEDED
Status: DISCONTINUED | OUTPATIENT
Start: 2017-12-27 | End: 2017-12-27 | Stop reason: SURG

## 2017-12-27 RX ORDER — HYDRALAZINE HYDROCHLORIDE 20 MG/ML
5 INJECTION INTRAMUSCULAR; INTRAVENOUS
Status: DISCONTINUED | OUTPATIENT
Start: 2017-12-27 | End: 2017-12-27 | Stop reason: HOSPADM

## 2017-12-27 RX ORDER — DOCUSATE SODIUM 100 MG/1
100 CAPSULE, LIQUID FILLED ORAL 2 TIMES DAILY
Status: DISCONTINUED | OUTPATIENT
Start: 2017-12-27 | End: 2017-12-28 | Stop reason: HOSPADM

## 2017-12-27 RX ORDER — LEVOTHYROXINE SODIUM 0.15 MG/1
150 TABLET ORAL
Status: DISCONTINUED | OUTPATIENT
Start: 2017-12-28 | End: 2017-12-28 | Stop reason: HOSPADM

## 2017-12-27 RX ORDER — PANTOPRAZOLE SODIUM 40 MG/1
40 TABLET, DELAYED RELEASE ORAL
Status: DISCONTINUED | OUTPATIENT
Start: 2017-12-28 | End: 2017-12-28 | Stop reason: HOSPADM

## 2017-12-27 RX ORDER — ONDANSETRON 2 MG/ML
INJECTION INTRAMUSCULAR; INTRAVENOUS AS NEEDED
Status: DISCONTINUED | OUTPATIENT
Start: 2017-12-27 | End: 2017-12-27 | Stop reason: SURG

## 2017-12-27 RX ORDER — MELOXICAM 15 MG/1
15 TABLET ORAL DAILY
Status: DISCONTINUED | OUTPATIENT
Start: 2017-12-28 | End: 2017-12-28 | Stop reason: HOSPADM

## 2017-12-27 RX ORDER — PROMETHAZINE HYDROCHLORIDE 25 MG/1
25 SUPPOSITORY RECTAL ONCE AS NEEDED
Status: DISCONTINUED | OUTPATIENT
Start: 2017-12-27 | End: 2017-12-27 | Stop reason: HOSPADM

## 2017-12-27 RX ORDER — SUCCINYLCHOLINE CHLORIDE 20 MG/ML
INJECTION INTRAMUSCULAR; INTRAVENOUS AS NEEDED
Status: DISCONTINUED | OUTPATIENT
Start: 2017-12-27 | End: 2017-12-27 | Stop reason: SURG

## 2017-12-27 RX ORDER — MIDAZOLAM HYDROCHLORIDE 1 MG/ML
2 INJECTION INTRAMUSCULAR; INTRAVENOUS
Status: DISCONTINUED | OUTPATIENT
Start: 2017-12-27 | End: 2017-12-27 | Stop reason: HOSPADM

## 2017-12-27 RX ORDER — FLUMAZENIL 0.1 MG/ML
0.2 INJECTION INTRAVENOUS AS NEEDED
Status: DISCONTINUED | OUTPATIENT
Start: 2017-12-27 | End: 2017-12-27 | Stop reason: HOSPADM

## 2017-12-27 RX ORDER — OXYCODONE AND ACETAMINOPHEN 7.5; 325 MG/1; MG/1
1 TABLET ORAL ONCE AS NEEDED
Status: DISCONTINUED | OUTPATIENT
Start: 2017-12-27 | End: 2017-12-27 | Stop reason: HOSPADM

## 2017-12-27 RX ORDER — SODIUM CHLORIDE, SODIUM LACTATE, POTASSIUM CHLORIDE, CALCIUM CHLORIDE 600; 310; 30; 20 MG/100ML; MG/100ML; MG/100ML; MG/100ML
9 INJECTION, SOLUTION INTRAVENOUS CONTINUOUS
Status: DISCONTINUED | OUTPATIENT
Start: 2017-12-27 | End: 2017-12-27 | Stop reason: HOSPADM

## 2017-12-27 RX ORDER — ONDANSETRON 4 MG/1
4 TABLET, ORALLY DISINTEGRATING ORAL EVERY 6 HOURS PRN
Status: DISCONTINUED | OUTPATIENT
Start: 2017-12-27 | End: 2017-12-28 | Stop reason: HOSPADM

## 2017-12-27 RX ORDER — LIDOCAINE HYDROCHLORIDE 20 MG/ML
INJECTION, SOLUTION INFILTRATION; PERINEURAL AS NEEDED
Status: DISCONTINUED | OUTPATIENT
Start: 2017-12-27 | End: 2017-12-27 | Stop reason: SURG

## 2017-12-27 RX ORDER — HYDROCODONE BITARTRATE AND ACETAMINOPHEN 7.5; 325 MG/1; MG/1
2 TABLET ORAL EVERY 4 HOURS PRN
Status: DISCONTINUED | OUTPATIENT
Start: 2017-12-27 | End: 2017-12-28 | Stop reason: HOSPADM

## 2017-12-27 RX ORDER — LABETALOL HYDROCHLORIDE 5 MG/ML
5 INJECTION, SOLUTION INTRAVENOUS
Status: DISCONTINUED | OUTPATIENT
Start: 2017-12-27 | End: 2017-12-27 | Stop reason: HOSPADM

## 2017-12-27 RX ORDER — MAGNESIUM HYDROXIDE 1200 MG/15ML
LIQUID ORAL AS NEEDED
Status: DISCONTINUED | OUTPATIENT
Start: 2017-12-27 | End: 2017-12-27 | Stop reason: HOSPADM

## 2017-12-27 RX ORDER — SODIUM CHLORIDE, SODIUM LACTATE, POTASSIUM CHLORIDE, CALCIUM CHLORIDE 600; 310; 30; 20 MG/100ML; MG/100ML; MG/100ML; MG/100ML
INJECTION, SOLUTION INTRAVENOUS CONTINUOUS PRN
Status: DISCONTINUED | OUTPATIENT
Start: 2017-12-27 | End: 2017-12-27 | Stop reason: SURG

## 2017-12-27 RX ORDER — PROMETHAZINE HYDROCHLORIDE 25 MG/1
12.5 TABLET ORAL ONCE AS NEEDED
Status: DISCONTINUED | OUTPATIENT
Start: 2017-12-27 | End: 2017-12-27 | Stop reason: HOSPADM

## 2017-12-27 RX ORDER — FENTANYL CITRATE 50 UG/ML
50 INJECTION, SOLUTION INTRAMUSCULAR; INTRAVENOUS
Status: DISCONTINUED | OUTPATIENT
Start: 2017-12-27 | End: 2017-12-27 | Stop reason: HOSPADM

## 2017-12-27 RX ORDER — PROMETHAZINE HYDROCHLORIDE 25 MG/1
25 TABLET ORAL ONCE AS NEEDED
Status: DISCONTINUED | OUTPATIENT
Start: 2017-12-27 | End: 2017-12-27 | Stop reason: HOSPADM

## 2017-12-27 RX ORDER — ASPIRIN 81 MG/1
81 TABLET ORAL 2 TIMES DAILY
Status: DISCONTINUED | OUTPATIENT
Start: 2017-12-27 | End: 2017-12-28 | Stop reason: HOSPADM

## 2017-12-27 RX ORDER — HYDROMORPHONE HYDROCHLORIDE 1 MG/ML
0.5 INJECTION, SOLUTION INTRAMUSCULAR; INTRAVENOUS; SUBCUTANEOUS
Status: DISCONTINUED | OUTPATIENT
Start: 2017-12-27 | End: 2017-12-27 | Stop reason: HOSPADM

## 2017-12-27 RX ORDER — CEFAZOLIN SODIUM 2 G/100ML
2 INJECTION, SOLUTION INTRAVENOUS ONCE
Status: DISCONTINUED | OUTPATIENT
Start: 2017-12-27 | End: 2017-12-27 | Stop reason: HOSPADM

## 2017-12-27 RX ORDER — KETOROLAC TROMETHAMINE 30 MG/ML
30 INJECTION, SOLUTION INTRAMUSCULAR; INTRAVENOUS EVERY 8 HOURS
Status: DISCONTINUED | OUTPATIENT
Start: 2017-12-27 | End: 2017-12-28 | Stop reason: HOSPADM

## 2017-12-27 RX ORDER — FENTANYL CITRATE 50 UG/ML
50 INJECTION, SOLUTION INTRAMUSCULAR; INTRAVENOUS
Status: COMPLETED | OUTPATIENT
Start: 2017-12-27 | End: 2017-12-27

## 2017-12-27 RX ORDER — MIDAZOLAM HYDROCHLORIDE 1 MG/ML
1 INJECTION INTRAMUSCULAR; INTRAVENOUS
Status: DISCONTINUED | OUTPATIENT
Start: 2017-12-27 | End: 2017-12-27 | Stop reason: HOSPADM

## 2017-12-27 RX ORDER — HYDROCODONE BITARTRATE AND ACETAMINOPHEN 7.5; 325 MG/1; MG/1
1 TABLET ORAL EVERY 4 HOURS PRN
Status: DISCONTINUED | OUTPATIENT
Start: 2017-12-27 | End: 2017-12-28 | Stop reason: HOSPADM

## 2017-12-27 RX ORDER — ONDANSETRON 2 MG/ML
4 INJECTION INTRAMUSCULAR; INTRAVENOUS ONCE AS NEEDED
Status: COMPLETED | OUTPATIENT
Start: 2017-12-27 | End: 2017-12-27

## 2017-12-27 RX ORDER — PROPOFOL 10 MG/ML
VIAL (ML) INTRAVENOUS AS NEEDED
Status: DISCONTINUED | OUTPATIENT
Start: 2017-12-27 | End: 2017-12-27 | Stop reason: SURG

## 2017-12-27 RX ORDER — ACETAMINOPHEN 10 MG/ML
1000 INJECTION, SOLUTION INTRAVENOUS ONCE
Status: COMPLETED | OUTPATIENT
Start: 2017-12-27 | End: 2017-12-27

## 2017-12-27 RX ORDER — ACETAMINOPHEN 500 MG
1000 TABLET ORAL ONCE
Status: COMPLETED | OUTPATIENT
Start: 2017-12-27 | End: 2017-12-27

## 2017-12-27 RX ORDER — NALOXONE HCL 0.4 MG/ML
0.2 VIAL (ML) INJECTION AS NEEDED
Status: DISCONTINUED | OUTPATIENT
Start: 2017-12-27 | End: 2017-12-27 | Stop reason: HOSPADM

## 2017-12-27 RX ORDER — FAMOTIDINE 10 MG/ML
20 INJECTION, SOLUTION INTRAVENOUS ONCE
Status: COMPLETED | OUTPATIENT
Start: 2017-12-27 | End: 2017-12-27

## 2017-12-27 RX ORDER — DIPHENHYDRAMINE HYDROCHLORIDE 50 MG/ML
12.5 INJECTION INTRAMUSCULAR; INTRAVENOUS
Status: DISCONTINUED | OUTPATIENT
Start: 2017-12-27 | End: 2017-12-27 | Stop reason: HOSPADM

## 2017-12-27 RX ORDER — DEXAMETHASONE SODIUM PHOSPHATE 10 MG/ML
INJECTION INTRAMUSCULAR; INTRAVENOUS AS NEEDED
Status: DISCONTINUED | OUTPATIENT
Start: 2017-12-27 | End: 2017-12-27 | Stop reason: SURG

## 2017-12-27 RX ORDER — ROCURONIUM BROMIDE 10 MG/ML
INJECTION, SOLUTION INTRAVENOUS AS NEEDED
Status: DISCONTINUED | OUTPATIENT
Start: 2017-12-27 | End: 2017-12-27 | Stop reason: SURG

## 2017-12-27 RX ORDER — EPHEDRINE SULFATE 50 MG/ML
5 INJECTION, SOLUTION INTRAVENOUS ONCE AS NEEDED
Status: DISCONTINUED | OUTPATIENT
Start: 2017-12-27 | End: 2017-12-27 | Stop reason: HOSPADM

## 2017-12-27 RX ORDER — HYDROCODONE BITARTRATE AND ACETAMINOPHEN 7.5; 325 MG/1; MG/1
1 TABLET ORAL ONCE AS NEEDED
Status: DISCONTINUED | OUTPATIENT
Start: 2017-12-27 | End: 2017-12-27 | Stop reason: HOSPADM

## 2017-12-27 RX ORDER — CEFAZOLIN SODIUM 2 G/100ML
INJECTION, SOLUTION INTRAVENOUS AS NEEDED
Status: DISCONTINUED | OUTPATIENT
Start: 2017-12-27 | End: 2017-12-27 | Stop reason: SURG

## 2017-12-27 RX ADMIN — CEFAZOLIN SODIUM 2 G: 2 INJECTION, SOLUTION INTRAVENOUS at 14:04

## 2017-12-27 RX ADMIN — ACETAMINOPHEN 1000 MG: 500 TABLET ORAL at 13:06

## 2017-12-27 RX ADMIN — DOCUSATE SODIUM 100 MG: 100 CAPSULE, LIQUID FILLED ORAL at 21:34

## 2017-12-27 RX ADMIN — ROCURONIUM BROMIDE 20 MG: 10 INJECTION INTRAVENOUS at 15:01

## 2017-12-27 RX ADMIN — ONDANSETRON 4 MG: 2 INJECTION INTRAMUSCULAR; INTRAVENOUS at 15:48

## 2017-12-27 RX ADMIN — ROCURONIUM BROMIDE 10 MG: 10 INJECTION INTRAVENOUS at 14:08

## 2017-12-27 RX ADMIN — HYDROCODONE BITARTRATE AND ACETAMINOPHEN 2 TABLET: 7.5; 325 TABLET ORAL at 21:34

## 2017-12-27 RX ADMIN — SODIUM CHLORIDE, POTASSIUM CHLORIDE, SODIUM LACTATE AND CALCIUM CHLORIDE: 600; 310; 30; 20 INJECTION, SOLUTION INTRAVENOUS at 15:52

## 2017-12-27 RX ADMIN — MUPIROCIN: 20 OINTMENT TOPICAL at 21:34

## 2017-12-27 RX ADMIN — SODIUM CHLORIDE, POTASSIUM CHLORIDE, SODIUM LACTATE AND CALCIUM CHLORIDE: 600; 310; 30; 20 INJECTION, SOLUTION INTRAVENOUS at 13:27

## 2017-12-27 RX ADMIN — SODIUM CHLORIDE, POTASSIUM CHLORIDE, SODIUM LACTATE AND CALCIUM CHLORIDE 9 ML/HR: 600; 310; 30; 20 INJECTION, SOLUTION INTRAVENOUS at 13:29

## 2017-12-27 RX ADMIN — HYDROMORPHONE HYDROCHLORIDE 0.5 MG: 10 INJECTION INTRAMUSCULAR; INTRAVENOUS; SUBCUTANEOUS at 11:56

## 2017-12-27 RX ADMIN — PROPOFOL 150 MG: 10 INJECTION, EMULSION INTRAVENOUS at 14:08

## 2017-12-27 RX ADMIN — POTASSIUM CHLORIDE AND SODIUM CHLORIDE 75 ML/HR: 900; 150 INJECTION, SOLUTION INTRAVENOUS at 11:14

## 2017-12-27 RX ADMIN — SUCCINYLCHOLINE CHLORIDE 100 MG: 20 INJECTION, SOLUTION INTRAMUSCULAR; INTRAVENOUS; PARENTERAL at 14:08

## 2017-12-27 RX ADMIN — FENTANYL CITRATE 50 MCG: 50 INJECTION INTRAMUSCULAR; INTRAVENOUS at 14:47

## 2017-12-27 RX ADMIN — ACETAMINOPHEN 1000 MG: 10 INJECTION, SOLUTION INTRAVENOUS at 14:15

## 2017-12-27 RX ADMIN — FENTANYL CITRATE 50 MCG: 50 INJECTION INTRAMUSCULAR; INTRAVENOUS at 15:03

## 2017-12-27 RX ADMIN — TRANEXAMIC ACID 1000 MG: 100 INJECTION, SOLUTION INTRAVENOUS at 15:46

## 2017-12-27 RX ADMIN — ASPIRIN 81 MG: 81 TABLET ORAL at 21:34

## 2017-12-27 RX ADMIN — LIDOCAINE HYDROCHLORIDE 100 MG: 20 INJECTION, SOLUTION INFILTRATION; PERINEURAL at 14:08

## 2017-12-27 RX ADMIN — ROCURONIUM BROMIDE 20 MG: 10 INJECTION INTRAVENOUS at 14:42

## 2017-12-27 RX ADMIN — CEFAZOLIN SODIUM 2 G: 2 INJECTION, SOLUTION INTRAVENOUS at 21:40

## 2017-12-27 RX ADMIN — HYDROCODONE BITARTRATE AND ACETAMINOPHEN 1 TABLET: 7.5; 325 TABLET ORAL at 02:08

## 2017-12-27 RX ADMIN — FENTANYL CITRATE 50 MCG: 50 INJECTION INTRAMUSCULAR; INTRAVENOUS at 14:08

## 2017-12-27 RX ADMIN — FAMOTIDINE 20 MG: 10 INJECTION, SOLUTION INTRAVENOUS at 13:29

## 2017-12-27 RX ADMIN — KETOROLAC TROMETHAMINE 30 MG: 30 INJECTION, SOLUTION INTRAMUSCULAR at 23:12

## 2017-12-27 RX ADMIN — SUGAMMADEX 200 MG: 100 INJECTION, SOLUTION INTRAVENOUS at 15:55

## 2017-12-27 RX ADMIN — HYDROMORPHONE HYDROCHLORIDE 0.5 MG: 10 INJECTION INTRAMUSCULAR; INTRAVENOUS; SUBCUTANEOUS at 06:37

## 2017-12-27 RX ADMIN — TRANEXAMIC ACID 1000 MG: 100 INJECTION, SOLUTION INTRAVENOUS at 14:22

## 2017-12-27 RX ADMIN — DEXAMETHASONE SODIUM PHOSPHATE 8 MG: 10 INJECTION INTRAMUSCULAR; INTRAVENOUS at 14:21

## 2017-12-27 RX ADMIN — MUPIROCIN 1 APPLICATION: 20 OINTMENT TOPICAL at 13:09

## 2017-12-27 RX ADMIN — ONDANSETRON 4 MG: 2 INJECTION INTRAMUSCULAR; INTRAVENOUS at 16:48

## 2017-12-27 RX ADMIN — LEVOTHYROXINE SODIUM 150 MCG: 150 TABLET ORAL at 06:23

## 2017-12-27 NOTE — ANESTHESIA POSTPROCEDURE EVALUATION
Patient: Gloria Álvarez    Procedure Summary     Date Anesthesia Start Anesthesia Stop Room / Location    12/27/17 1402 1626  [facility] /  [facility]       Procedure Diagnosis Surgeon Provider    TOTAL HIP ARTHROPLASTY ANTERIOR WITH HANA TABLE (Left Hip) Closed subcapital fracture of left femur, initial encounter  (Closed subcapital fracture of left femur, initial encounter [S72.012A]) MD Viry Blanc MD          Anesthesia Type: general  Last vitals  BP   143/72 (12/27/17 1710)   Temp   36.6 °C (97.8 °F) (12/27/17 1710)   Pulse   83 (12/27/17 1710)   Resp   12 (12/27/17 1710)     SpO2   100 % (12/27/17 1710)     Post Anesthesia Care and Evaluation    Patient location during evaluation: PACU  Patient participation: complete - patient participated  Level of consciousness: awake  Pain management: adequate  Airway patency: patent  Anesthetic complications: No anesthetic complications  PONV Status: none  Cardiovascular status: acceptable  Respiratory status: acceptable  Hydration status: acceptable

## 2017-12-27 NOTE — ANESTHESIA PROCEDURE NOTES
Airway  Urgency: elective    Date/Time: 12/27/2017 2:10 PM  Airway not difficult    General Information and Staff    Patient location during procedure: OR  Anesthesiologist: CHANCE RODRIGUEZ  CRNA: FRANCISCA DA SILVA    Indications and Patient Condition  Indications for airway management: airway protection    Preoxygenated: yes  MILS not maintained throughout  Mask difficulty assessment: 1 - vent by mask    Final Airway Details  Final airway type: endotracheal airway      Successful airway: ETT  Cuffed: yes   Successful intubation technique: direct laryngoscopy  Facilitating devices/methods: anterior pressure/BURP  Endotracheal tube insertion site: oral  Blade: John  Blade size: #3  ETT size: 7.0 mm  Cormack-Lehane Classification: grade I - full view of glottis  Placement verified by: chest auscultation   Measured from: lips  ETT to lips (cm): 20  Number of attempts at approach: 1    Additional Comments  Pre O2, SIAI

## 2017-12-28 VITALS
DIASTOLIC BLOOD PRESSURE: 59 MMHG | WEIGHT: 145 LBS | TEMPERATURE: 98.1 F | RESPIRATION RATE: 16 BRPM | BODY MASS INDEX: 24.75 KG/M2 | HEIGHT: 64 IN | OXYGEN SATURATION: 98 % | SYSTOLIC BLOOD PRESSURE: 122 MMHG | HEART RATE: 78 BPM

## 2017-12-28 LAB
ANION GAP SERPL CALCULATED.3IONS-SCNC: 10.4 MMOL/L
BASOPHILS # BLD AUTO: 0.01 10*3/MM3 (ref 0–0.2)
BASOPHILS NFR BLD AUTO: 0.1 % (ref 0–1.5)
BUN BLD-MCNC: 12 MG/DL (ref 8–23)
BUN/CREAT SERPL: 15.4 (ref 7–25)
CALCIUM SPEC-SCNC: 8.4 MG/DL (ref 8.6–10.5)
CHLORIDE SERPL-SCNC: 103 MMOL/L (ref 98–107)
CO2 SERPL-SCNC: 24.6 MMOL/L (ref 22–29)
CREAT BLD-MCNC: 0.78 MG/DL (ref 0.57–1)
DEPRECATED RDW RBC AUTO: 41.5 FL (ref 37–54)
EOSINOPHIL # BLD AUTO: 0 10*3/MM3 (ref 0–0.7)
EOSINOPHIL NFR BLD AUTO: 0 % (ref 0.3–6.2)
ERYTHROCYTE [DISTWIDTH] IN BLOOD BY AUTOMATED COUNT: 12.3 % (ref 11.7–13)
GFR SERPL CREATININE-BSD FRML MDRD: 72 ML/MIN/1.73
GLUCOSE BLD-MCNC: 135 MG/DL (ref 65–99)
HCT VFR BLD AUTO: 33.8 % (ref 35.6–45.5)
HGB BLD-MCNC: 11.1 G/DL (ref 11.9–15.5)
IMM GRANULOCYTES # BLD: 0.06 10*3/MM3 (ref 0–0.03)
IMM GRANULOCYTES NFR BLD: 0.3 % (ref 0–0.5)
LYMPHOCYTES # BLD AUTO: 0.48 10*3/MM3 (ref 0.9–4.8)
LYMPHOCYTES NFR BLD AUTO: 2.5 % (ref 19.6–45.3)
MCH RBC QN AUTO: 30.2 PG (ref 26.9–32)
MCHC RBC AUTO-ENTMCNC: 32.8 G/DL (ref 32.4–36.3)
MCV RBC AUTO: 92.1 FL (ref 80.5–98.2)
MONOCYTES # BLD AUTO: 1.39 10*3/MM3 (ref 0.2–1.2)
MONOCYTES NFR BLD AUTO: 7.1 % (ref 5–12)
NEUTROPHILS # BLD AUTO: 17.57 10*3/MM3 (ref 1.9–8.1)
NEUTROPHILS NFR BLD AUTO: 90 % (ref 42.7–76)
PLATELET # BLD AUTO: 198 10*3/MM3 (ref 140–500)
PMV BLD AUTO: 10.1 FL (ref 6–12)
POTASSIUM BLD-SCNC: 3.8 MMOL/L (ref 3.5–5.2)
RBC # BLD AUTO: 3.67 10*6/MM3 (ref 3.9–5.2)
SODIUM BLD-SCNC: 138 MMOL/L (ref 136–145)
WBC NRBC COR # BLD: 19.51 10*3/MM3 (ref 4.5–10.7)

## 2017-12-28 PROCEDURE — 80048 BASIC METABOLIC PNL TOTAL CA: CPT | Performed by: HOSPITALIST

## 2017-12-28 PROCEDURE — 85025 COMPLETE CBC W/AUTO DIFF WBC: CPT | Performed by: HOSPITALIST

## 2017-12-28 PROCEDURE — 97162 PT EVAL MOD COMPLEX 30 MIN: CPT

## 2017-12-28 PROCEDURE — 25010000002 KETOROLAC TROMETHAMINE PER 15 MG: Performed by: ORTHOPAEDIC SURGERY

## 2017-12-28 PROCEDURE — 97110 THERAPEUTIC EXERCISES: CPT

## 2017-12-28 PROCEDURE — 25010000003 CEFAZOLIN IN DEXTROSE 2-4 GM/100ML-% SOLUTION: Performed by: ORTHOPAEDIC SURGERY

## 2017-12-28 RX ORDER — PSEUDOEPHEDRINE HCL 30 MG
100 TABLET ORAL 2 TIMES DAILY
Qty: 60 CAPSULE | Refills: 0 | Status: SHIPPED | OUTPATIENT
Start: 2017-12-28 | End: 2018-01-10

## 2017-12-28 RX ORDER — HYDROCODONE BITARTRATE AND ACETAMINOPHEN 7.5; 325 MG/1; MG/1
1-2 TABLET ORAL EVERY 4 HOURS PRN
Qty: 54 TABLET | Refills: 0 | Status: SHIPPED | OUTPATIENT
Start: 2017-12-28 | End: 2018-01-06

## 2017-12-28 RX ORDER — PANTOPRAZOLE SODIUM 40 MG/1
40 TABLET, DELAYED RELEASE ORAL DAILY
Qty: 30 TABLET | Refills: 0 | OUTPATIENT
Start: 2017-12-28 | End: 2018-01-27

## 2017-12-28 RX ADMIN — DOCUSATE SODIUM 100 MG: 100 CAPSULE, LIQUID FILLED ORAL at 08:26

## 2017-12-28 RX ADMIN — ASPIRIN 81 MG: 81 TABLET ORAL at 08:26

## 2017-12-28 RX ADMIN — HYDROCODONE BITARTRATE AND ACETAMINOPHEN 2 TABLET: 7.5; 325 TABLET ORAL at 02:41

## 2017-12-28 RX ADMIN — CEFAZOLIN SODIUM 2 G: 2 INJECTION, SOLUTION INTRAVENOUS at 06:29

## 2017-12-28 RX ADMIN — LEVOTHYROXINE SODIUM 150 MCG: 150 TABLET ORAL at 06:28

## 2017-12-28 RX ADMIN — MELOXICAM 15 MG: 15 TABLET ORAL at 08:26

## 2017-12-28 RX ADMIN — HYDROCODONE BITARTRATE AND ACETAMINOPHEN 2 TABLET: 7.5; 325 TABLET ORAL at 06:29

## 2017-12-28 RX ADMIN — KETOROLAC TROMETHAMINE 30 MG: 30 INJECTION, SOLUTION INTRAMUSCULAR at 08:26

## 2017-12-28 RX ADMIN — POLYETHYLENE GLYCOL (3350) 17 G: 17 POWDER, FOR SOLUTION ORAL at 08:26

## 2017-12-28 RX ADMIN — HYDROCODONE BITARTRATE AND ACETAMINOPHEN 2 TABLET: 7.5; 325 TABLET ORAL at 10:35

## 2017-12-28 RX ADMIN — MUPIROCIN: 20 OINTMENT TOPICAL at 08:26

## 2017-12-28 RX ADMIN — PANTOPRAZOLE SODIUM 40 MG: 40 TABLET, DELAYED RELEASE ORAL at 06:28

## 2018-01-09 ENCOUNTER — TELEPHONE (OUTPATIENT)
Dept: ORTHOPEDIC SURGERY | Facility: CLINIC | Age: 73
End: 2018-01-09

## 2018-01-09 NOTE — TELEPHONE ENCOUNTER
Patient called needing refill for Norco 7.5/325mg. At her discharge on 12/28, a Dr. Canela wrote script for #54. Her  took it to [facility] pharmacy ([phone]). They only filled 1/2 of the script because did not have enough in stock. They will not honor the remainder and say they need new script. Patient only has a few pills left and needs for PT. She has a f/u appt with [facility] on 1/11. Asking if someone can write script for pickup on 1/10.

## 2018-01-11 ENCOUNTER — OFFICE VISIT (OUTPATIENT)
Dept: ORTHOPEDIC SURGERY | Facility: CLINIC | Age: 73
End: 2018-01-11

## 2018-01-11 VITALS — WEIGHT: 147.6 LBS | BODY MASS INDEX: 24.59 KG/M2 | HEIGHT: 65 IN | TEMPERATURE: 98.6 F

## 2018-01-11 DIAGNOSIS — Z96.642 STATUS POST TOTAL REPLACEMENT OF LEFT HIP: Primary | ICD-10-CM

## 2018-01-11 PROCEDURE — 99024 POSTOP FOLLOW-UP VISIT: CPT | Performed by: ORTHOPAEDIC SURGERY

## 2018-01-11 PROCEDURE — 73502 X-RAY EXAM HIP UNI 2-3 VIEWS: CPT | Performed by: ORTHOPAEDIC SURGERY

## 2018-01-11 RX ORDER — HYDROCODONE BITARTRATE AND ACETAMINOPHEN 5; 325 MG/1; MG/1
1 TABLET ORAL EVERY 4 HOURS PRN
Qty: 40 TABLET | Refills: 0 | OUTPATIENT
Start: 2018-01-11 | End: 2018-01-27

## 2018-01-11 RX ORDER — HYDROCODONE BITARTRATE AND ACETAMINOPHEN 5; 325 MG/1; MG/1
1 TABLET ORAL EVERY 4 HOURS PRN
Qty: 20 TABLET | Refills: 0 | Status: SHIPPED | OUTPATIENT
Start: 2018-01-11 | End: 2018-01-11 | Stop reason: SDUPTHER

## 2018-01-11 NOTE — PROGRESS NOTES
Gloria Álvarez : 1945 MRN: [id number] DATE: 2018    DIAGNOSIS: 2 week follow up left total hip     SUBJECTIVE:Patient returns today for 2 week follow up of left total hip replacement. Patient reports doing well with no unusual complaints. Appears to be progressing appropriately.    OBJECTIVE:   Exam:. The incision is healing appropriately. No sign of infection. Range of motion is progressing as expected. The calf is soft and nontender with a negative Homans sign.    DIAGNOSTIC STUDIES  Xrays: 2 views of the left hip (AP pelvis and lateral left hip) were ordered and reviewed for evaluation of recent hip replacement. They demonstrate a well positioned, well aligned hip replacement without complicating factors noted. In comparison with previous films there has been interval implant placement.    ASSESSMENT: 2 week status post left hip replacement.    PLAN: 1) Staples removed and steri strips applied   2) PT exercises   3) Discontinue JOSELYN hose   4) Continue ice PRN   5) WBAT   6) aspirin 325 mg orally every day for 1 month   7) Follow up in 6 weeks with repeat Xrays of left hip (2views)    Oral Ford MD  2018

## 2018-01-25 ENCOUNTER — OFFICE VISIT (OUTPATIENT)
Dept: ORTHOPEDIC SURGERY | Facility: CLINIC | Age: 73
End: 2018-01-25

## 2018-01-25 VITALS — BODY MASS INDEX: 24.49 KG/M2 | TEMPERATURE: 98.2 F | HEIGHT: 65 IN | WEIGHT: 147 LBS

## 2018-01-25 DIAGNOSIS — M79.662 PAIN IN LEFT LOWER LEG: Primary | ICD-10-CM

## 2018-01-25 PROCEDURE — 73590 X-RAY EXAM OF LOWER LEG: CPT | Performed by: NURSE PRACTITIONER

## 2018-01-25 PROCEDURE — 99214 OFFICE O/P EST MOD 30 MIN: CPT | Performed by: NURSE PRACTITIONER

## 2018-01-25 NOTE — PROGRESS NOTES
Patient: Gloria Álvarez  YOB: 1945 72 y.o. female  Medical Record Number: [id number]    Chief Complaints:   Chief Complaint   Patient presents with   • Left Lower Leg - Follow-up, Pain       History of Present Illness:Gloria Álvarez is a 72 y.o. female who presents With a new complaint today.  Patient reports starting on January 15 (10 days ago) with complaints of left shin pain.  Patient apparently is been very active rehabbing her left hip replacement and started with acute onset onset of pain after doing quite a bit of walking.  It is been very sore and painful since.  She describes the pain as an intermittent stabbing type pain better with ice and rest.    Allergies:   Allergies   Allergen Reactions   • Ciprofloxacin Itching     JOINTS ACHY   • Sulfa Antibiotics Itching   • Varicella Virus Vaccine Live Other (See Comments)     FLU LIKE SYMPTOMS, RED AT SITE   • Zoster Vaccine Live Other (See Comments)     FLU LIKE SYMPTOMS, RED AT SITE       Medications:   Current Outpatient Prescriptions   Medication Sig Dispense Refill   • aspirin 81 MG tablet Take 1 tablet by mouth 2 (Two) Times a Day for 42 days. 60 tablet 1   • Calcium Carbonate-Vit D-Min (CALTRATE 600+D PLUS MINERALS) 600-800 MG-UNIT chewable tablet Chew 1 tablet Daily.     • HYDROcodone-acetaminophen (NORCO) 5-325 MG per tablet Take 1 tablet by mouth Every 4 (Four) Hours As Needed for Moderate Pain  for up to 40 days. 1-2 oral Q4H PRN severe pain 40 tablet 0   • levothyroxine (SYNTHROID, LEVOTHROID) 150 MCG tablet Take 150 mcg by mouth Daily.     • Lutein 6 MG capsule Take 6 mg by mouth Daily. Take as directed.     • pantoprazole (PROTONIX) 40 MG EC tablet Take 1 tablet by mouth Daily. 30 tablet 0   • vitamin E 400 UNIT capsule Take 400 Units by mouth Daily.       No current facility-administered medications for this visit.          The following portions of the patient's history were reviewed and updated as appropriate: allergies,  "current medications, past family history, past medical history, past social history, past surgical history and problem list.    Review of Systems:   A 14 point review of systems was performed. All systems negative except pertinent positives/negative listed in HPI above    Physical Exam:   Vitals:    01/25/18 1201   Temp: 98.2 °F (36.8 °C)   TempSrc: Temporal Artery    Weight: 66.7 kg (147 lb)   Height: 165.1 cm (65\")       General: A and O x 3, ASA, NAD    SCLERA:    Normal    DENTITION:   Normal  Skin clear no unusual lesions noted  Left tibia the patient is very tender to touch over the proximal aspect of the left tibia.  She has no unusual erythema ecchymosis warmth noted.    Radiology:  Xrays 2 views left tib-fib were ordered and reviewed today secondary to pain and were normal.  No obvious fracture seen.  No comparative views available     Assessment/Plan:  Severe left tibia pain    We will proceed with an MRI of the left tibia and the patient will call couple days after regarding the results and treatment options.  In the meantime she'll continue with scheduled anti-inflammatories, ice, and avoid any exercises that make the pain worse.  " .

## 2018-01-26 ENCOUNTER — TELEPHONE (OUTPATIENT)
Dept: ORTHOPEDIC SURGERY | Facility: CLINIC | Age: 73
End: 2018-01-26

## 2018-01-26 ENCOUNTER — HOSPITAL ENCOUNTER (OUTPATIENT)
Dept: MRI IMAGING | Facility: HOSPITAL | Age: 73
Discharge: HOME | End: 2018-01-26
Admitting: NURSE PRACTITIONER

## 2018-01-26 DIAGNOSIS — M79.662 PAIN IN LEFT LOWER LEG: ICD-10-CM

## 2018-01-26 PROCEDURE — 73718 MRI LOWER EXTREMITY W/O DYE: CPT

## 2018-01-26 NOTE — TELEPHONE ENCOUNTER
Please let patient know that the MRI of her tib-fib.  Thankfully were completely normal with no fracture seen.  Okay to continue with physical therapy and would recommend ice to that area if still sore.

## 2018-01-29 ENCOUNTER — APPOINTMENT (OUTPATIENT)
Dept: MRI IMAGING | Facility: HOSPITAL | Age: 73
End: 2018-01-29

## 2018-01-30 ENCOUNTER — APPOINTMENT (OUTPATIENT)
Dept: PHYSICAL THERAPY | Facility: HOSPITAL | Age: 73
End: 2018-01-30
Attending: ORTHOPAEDIC SURGERY

## 2018-01-30 ENCOUNTER — HOSPITAL ENCOUNTER (OUTPATIENT)
Dept: PHYSICAL THERAPY | Facility: HOSPITAL | Age: 73
Setting detail: THERAPIES SERIES
Discharge: HOME | End: 2018-01-30
Attending: ORTHOPAEDIC SURGERY

## 2018-01-30 DIAGNOSIS — Z78.9 DIFFICULTY WITH ACTIVITIES OF DAILY LIVING: ICD-10-CM

## 2018-01-30 DIAGNOSIS — M25.552 LEFT HIP PAIN: Primary | ICD-10-CM

## 2018-01-30 DIAGNOSIS — Z96.642 HISTORY OF TOTAL LEFT HIP ARTHROPLASTY: ICD-10-CM

## 2018-01-30 DIAGNOSIS — R26.2 DIFFICULTY WALKING: ICD-10-CM

## 2018-01-30 PROCEDURE — 97110 THERAPEUTIC EXERCISES: CPT

## 2018-01-30 PROCEDURE — 97161 PT EVAL LOW COMPLEX 20 MIN: CPT

## 2018-01-30 PROCEDURE — G8978 MOBILITY CURRENT STATUS: HCPCS

## 2018-01-30 PROCEDURE — G8979 MOBILITY GOAL STATUS: HCPCS

## 2018-01-30 NOTE — THERAPY EVALUATION
Outpatient Physical Therapy Ortho Initial Evaluation  [facility]     Patient Name: Gloria Álvarez  : 1945  MRN: [id number]  Today's Date: 2018      Visit Date: 2018    Patient Active Problem List   Diagnosis   • Papillary carcinoma of thyroid   • Atrophic vaginitis   • Chronic interstitial cystitis   • Hyperlipidemia   • Insomnia   • Postmenopausal status   • Postoperative hypothyroidism   • Hx of benign carcinoid tumor   • Hx of diverticulitis of colon   • Age-related osteoporosis without current pathological fracture   • Closed subcapital fracture of left femur   • Hypokalemia        Past Medical History:   Diagnosis Date   • Depression     Patient states she's not had depressionn   • Diverticulosis    • Malignant carcinoid tumor of rectum 2007   • Osteoporosis    • Papillary carcinoma of thyroid    • Personal history of asthma         Past Surgical History:   Procedure Laterality Date   • CATARACT EXTRACTION Bilateral    • CHOLECYSTECTOMY     • COLONOSCOPY  2012    complete   • COLONOSCOPY N/A 2017    Procedure: COLONOSCOPY TO CECUM;  Surgeon: Andres Lambert MD;  Location: [facility] ENDOSCOPY;  Service:    • RECTAL TUMOR BY PROCTOTOMY EXCISION      CARCINOID   • TOTAL HIP ARTHROPLASTY Left 2017    Procedure: TOTAL HIP ARTHROPLASTY ANTERIOR WITH HANA TABLE;  Surgeon: Oral Ford MD;  Location: [facility] MAIN OR;  Service:    • TOTAL THYROIDECTOMY      7/3/12 [facility] / DR DEE/ 3CM PAP MET  NODULE RIGHT LOBE / LEFT LOBE -       Visit Dx:     ICD-10-CM ICD-9-CM   1. Left hip pain M25.552 719.45   2. History of total left hip arthroplasty Z96.642 V43.64   3. Difficulty walking R26.2 719.7   4. Difficulty with activities of daily living R53.81 799.3             Patient History       18 1200          History    Chief Complaint Pain;Joint stiffness;Muscle weakness;Difficulty with daily activities;Difficulty Walking  -CN      Type of Pain Lower Extremity /  Leg;Knee pain  -CN      Date Current Problem(s) Began 12/26/17  -CN      Brief Description of Current Complaint Pt reports fall at [employer] on 12/26 causing L hip fracture. Pt underwent surgery on 12/27 for RENITA with anterior approach and had home health upon D/C from hospital. HH ended on 1/27 and pt has been compliant with HEP. Pt reports very little pain in hip, however continues with increased pain in L knee and shin regions. Pt is planning to go on 2 month vacation at the beginning of March and is concerned about progress with PT prior to trip.   -CN      Previous treatment for THIS PROBLEM Surgery  -CN      Onset Date- PT 1/30/18  -CN      Surgery Date: 12/27/17  -CN      Patient/Caregiver Goals Relieve pain;Return to prior level of function;Improve mobility  -CN      Occupation/sports/leisure activities Silver travelmobeakers, travel  -CN      Patient seeing anyone else for problem(s)? Yes  -CN      Pain     Pain Location Knee;Leg  -CN      Pain at Present 2  -CN      Pain at Best 0  -CN      Pain at Worst 5  -CN      Pain Frequency Intermittent  -CN      Pain Description Sharp;Shooting;Aching  -CN      What Performance Factors Make the Current Problem(s) WORSE? Standing extended periods, heel raises and ankle pumps  -CN      What Performance Factors Make the Current Problem(s) BETTER? Heat, ice, tylenol  -CN      Is your sleep disturbed? No  -CN      What position do you sleep in? Supine  -CN      Difficulties with ADL's? Yes, in/out of tub, unable to prepare meals, cleaning, getting back to doing laundry  -CN      Difficulties with recreational activities? Yes, Silver sneakers 2x/week, vacation in March for 2 months, driving with trailer  -CN      Fall Risk Assessment    Any falls in the past year: Yes  -CN      Number of falls reported in the last 12 months 1  -CN      Factors that contributed to the fall: Tripped  -CN      Daily Activities    Primary Language English  -CN      Are you able to read Yes  -CN       Are you able to write Yes  -CN      How does patient learn best? Listening  -CN      Pt Participated in POC and Goals Yes  -CN      Safety    Are you being hurt, hit, or frightened by anyone at home or in your life? No  -CN      Are you being neglected by a caregiver No  -CN        User Key  (r) = Recorded By, (t) = Taken By, (c) = Cosigned By    Initials Name Provider Type    BRAEDEN Smith PT Physical Therapist                PT Ortho       01/30/18 1400    Left Hip    Hip Flexion Gross Movement (4-/5) good minus  -CN    Hip ABduction Gross Movement (4-/5) good minus  -CN    Hip ADduction Gross Movement (4-/5) good minus  -CN    Right Hip    Hip Flexion Gross Movement (4/5) good  -CN    Hip ABduction Gross Movement (4+/5) good plus  -CN    Hip ADduction Gross Movement (4/5) good  -CN    Left Knee    Knee Extension Gross Movement (4/5) good  -CN    Knee Flexion Gross Movement (4/5) good  -CN    Right Knee    Knee Extension Gross Movement (4+/5) good plus  -CN    Knee Flexion Gross Movement (4+/5) good plus  -CN    Left Ankle/Foot    Ankle PF Gross Movement (4+/5) good plus  -CN    Ankle Dorsiflexion Gross Movement (4+/5) good plus  -CN    Right Ankle/Foot    Ankle PF Gross Movement (5/5) normal  -CN    Ankle Dorsiflexion Gross Movement (5/5) normal  -CN    Lower Extremity Flexibility    Hamstrings Moderately limited  -CN    Hip Flexors Mildly limited  -CN    Hip External Rotators Moderately limited  -CN    Hip Internal Rotators Mildly limited  -CN    Gait Assessment/Treatment    Gait, Comment Pt ambulates with SPC/Rwx at times, slight antalgia noted at times, dec stance time on L LE  -CN      User Key  (r) = Recorded By, (t) = Taken By, (c) = Cosigned By    Initials Name Provider Type    BRAEDEN Smith PT Physical Therapist                      Therapy Education  Education Details: Anatomy, eval findings, physiology of healing  Given: HEP, Symptoms/condition management, Pain management,  Posture/body mechanics  Program: New  How Provided: Verbal, Demonstration, Written  Provided to: Patient  Level of Understanding: Teach back education performed, Verbalized, Demonstrated           PT OP Goals       01/30/18 1400       PT Short Term Goals    STG Date to Achieve 02/13/18  -CN     STG 1 Pt will report subjective pain at 2/10 or less to demonstrate symptom management with ADLs and recreational activities.   -CN     STG 1 Progress New  -CN     STG 2 Pt will be independent and compliant with HEP in order to facilitate self-management of symptoms.   -CN     STG 2 Progress New  -CN     Long Term Goals    LTG Date to Achieve 02/27/18  -CN     LTG 1 Pt will demonstrate increased gross hip strength to 4+/5 on left side in order to decrease UE use for functional mobility.   -CN     LTG 1 Progress New  -CN     LTG 2 Pt will ambulate without AD community distances with normalized gait pattern in order to facilitate return to recreational activities.   -CN     LTG 2 Progress New  -CN     LTG 3 Pt will improve score on LEFS  to 65% for improved functional mobility.   -CN     LTG 3 Progress New  -CN     LTG 4 Pt will report 50% reduction in symptoms into L knee and shin.   -CN     LTG 4 Progress New  -CN     Time Calculation    PT Goal Re-Cert Due Date 02/28/18  -CN       User Key  (r) = Recorded By, (t) = Taken By, (c) = Cosigned By    Initials Name Provider Type    BRAEDEN Smith, PT Physical Therapist                PT Assessment/Plan       01/30/18 1432       PT Assessment    Functional Limitations Limitations in functional capacity and performance;Impaired gait;Performance in leisure activities;Limitation in home management  -CN     Impairments Balance;Muscle strength;Pain;Gait;Impaired flexibility  -CN     Assessment Comments Pt is a 72 year old female in stable clinical condition s/p L RENITA on 12/27 with anterior approach. Pt fell at [employer] [place] fracturing her L hip and D/C'ed from [facility] to  HHPT. Pt finished with HH on 1/27 and is hoping to improve gait, strength and stability in order to go on previously scheduled 2 month vacation at the beginning of March. Pt demonstrates L LE weakness as well as impaired flexibility in hip and knee tissues. Pt ambulting with SPC at home and Rwx outside of home and has difficulty with SLS at this time. Pt compliant with HHPT HEP and added stretching and glute activation exercises today which pt will perform daily. Pt scored 42.5% on the LEFS where 100% is no disability and is limited in participation in household chores such as cleaning and laundry and requires assist to get in/out of bathtub. Pt would benefit from skilled PT to address the deficits and return to PLOF.   -CN     Please refer to paper survey for additional self-reported information Yes  -CN     Rehab Potential Good  -CN     Patient/caregiver participated in establishment of treatment plan and goals Yes  -CN     Patient would benefit from skilled therapy intervention Yes  -CN     PT Plan    PT Frequency 2x/week  -CN     Predicted Duration of Therapy Intervention (days/wks) 4 weeks  -CN     Planned CPT's? PT EVAL LOW COMPLEXITY: 31305;PT RE-EVAL: 59279;PT THER PROC EA 15 MIN: 65481;PT THER ACT EA 15 MIN: 91130;PT MANUAL THERAPY EA 15 MIN: 21246;PT NEUROMUSC RE-EDUCATION EA 15 MIN: 00726;PT GAIT TRAINING EA 15 MIN: 06826;PT HOT OR COLD PACK TREAT MCARE;PT ELECTRICAL STIM UNATTEND: ;PT HOT/COLD PACK WC NONMCARE: 86872  -CN     Physical Therapy Interventions (Optional Details) manual therapy techniques;ROM (Range of Motion);strengthening;stair training;modalities;stretching;neuromuscular re-education;patient/family education;home exercise program  -CN     PT Plan Comments PT to treat 2x/week for 4 weeks consisting of ROM, strengthening, flexibility and stability exercises. Consider Nustep or recumbant bike warm up, prone hip extension and prone HS curls next visit.   -CN       User Key  (r) = Recorded  By, (t) = Taken By, (c) = Cosigned By    Initials Name Provider Type    CN Pooja Smith, KEVIN Physical Therapist                  Exercises       01/30/18 1400          Exercise 1    Exercise Name 1 Piriformis/hip capsule stretch  -CN      Cueing 1 Demo  -CN      Reps 1 3  -CN      Time (Seconds) 1 20  -CN      Exercise 2    Exercise Name 2 Bridges  -CN      Cueing 2 Verbal  -CN      Reps 2 10  -CN      Exercise 3    Exercise Name 3 HL hip abduction  -CN      Cueing 3 Verbal  -CN      Reps 3 10  -CN      Additional Comments Orange latex free TB; 5x B, 5x alt  -CN      Exercise 4    Exercise Name 4 Seated HS stretch  -CN      Cueing 4 Demo  -CN      Reps 4 3  -CN      Time (Seconds) 4 20  -CN        User Key  (r) = Recorded By, (t) = Taken By, (c) = Cosigned By    Initials Name Provider Type    BRAEDEN Smith PT Physical Therapist                        Outcome Measure Options: Lower Extremity Functional Scale (LEFS)         Time Calculation:   Start Time: 1215  Stop Time: 1302  Time Calculation (min): 47 min     Therapy Charges for Today     Code Description Service Date Service Provider Modifiers Qty    08361066708 HC PT MOBILITY CURRENT 1/30/2018 Pooja Smith, PT GP, CK 1    76326600031 HC PT MOBILITY PROJECTED 1/30/2018 Pooja Smith, PT GP, CJ 1    87328030748 HC PT THER PROC EA 15 MIN 1/30/2018 Pooja Smith, PT GP 1    16353715235 HC PT EVAL LOW COMPLEXITY 2 1/30/2018 Pooja Smith, PT GP 1          PT G-Codes  PT Professional Judgement Used?: Yes  Outcome Measure Options: Lower Extremity Functional Scale (LEFS)  Score: 42.5% where 100% is no disability  Functional Limitation: Mobility: Walking and moving around  Mobility: Walking and Moving Around Current Status (): At least 40 percent but less than 60 percent impaired, limited or restricted  Mobility: Walking and Moving Around Goal Status (): At least 20 percent but less than 40 percent  impaired, limited or restricted         Pooja Smith, PT  1/30/2018

## 2018-02-01 ENCOUNTER — HOSPITAL ENCOUNTER (OUTPATIENT)
Dept: PHYSICAL THERAPY | Facility: HOSPITAL | Age: 73
Setting detail: THERAPIES SERIES
Discharge: HOME | End: 2018-02-01

## 2018-02-01 DIAGNOSIS — Z78.9 DIFFICULTY WITH ACTIVITIES OF DAILY LIVING: ICD-10-CM

## 2018-02-01 DIAGNOSIS — M25.552 LEFT HIP PAIN: Primary | ICD-10-CM

## 2018-02-01 DIAGNOSIS — Z96.642 HISTORY OF TOTAL LEFT HIP ARTHROPLASTY: ICD-10-CM

## 2018-02-01 DIAGNOSIS — R26.2 DIFFICULTY WALKING: ICD-10-CM

## 2018-02-01 PROCEDURE — 97110 THERAPEUTIC EXERCISES: CPT

## 2018-02-01 NOTE — THERAPY TREATMENT NOTE
Outpatient Physical Therapy Ortho Treatment Note  [facility]     Patient Name: Gloria Álvarez  : 1945  MRN: [id number]  Today's Date: 2018      Visit Date: 2018    Visit Dx:    ICD-10-CM ICD-9-CM   1. Left hip pain M25.552 719.45   2. History of total left hip arthroplasty Z96.642 V43.64   3. Difficulty walking R26.2 719.7   4. Difficulty with activities of daily living R53.81 799.3       Patient Active Problem List   Diagnosis   • Papillary carcinoma of thyroid   • Atrophic vaginitis   • Chronic interstitial cystitis   • Hyperlipidemia   • Insomnia   • Postmenopausal status   • Postoperative hypothyroidism   • Hx of benign carcinoid tumor   • Hx of diverticulitis of colon   • Age-related osteoporosis without current pathological fracture   • Closed subcapital fracture of left femur   • Hypokalemia        Past Medical History:   Diagnosis Date   • Depression     Patient states she's not had depressionn   • Diverticulosis    • Malignant carcinoid tumor of rectum 2007   • Osteoporosis    • Papillary carcinoma of thyroid    • Personal history of asthma         Past Surgical History:   Procedure Laterality Date   • CATARACT EXTRACTION Bilateral    • CHOLECYSTECTOMY     • COLONOSCOPY  2012    complete   • COLONOSCOPY N/A 2017    Procedure: COLONOSCOPY TO CECUM;  Surgeon: Andres Lambert MD;  Location: [facility] ENDOSCOPY;  Service:    • RECTAL TUMOR BY PROCTOTOMY EXCISION      CARCINOID   • TOTAL HIP ARTHROPLASTY Left 2017    Procedure: TOTAL HIP ARTHROPLASTY ANTERIOR WITH HANA TABLE;  Surgeon: Oral Ford MD;  Location: [facility] MAIN OR;  Service:    • TOTAL THYROIDECTOMY      7/3/12 [facility] / DR DEE/ 3CM PAP MET  NODULE RIGHT LOBE / LEFT LOBE -             PT Ortho       18 1400    Left Hip    Hip Flexion Gross Movement (4-/5) good minus  -CN    Hip ABduction Gross Movement (4-/5) good minus  -CN    Hip ADduction Gross Movement (4-/5) good minus  -CN     Right Hip    Hip Flexion Gross Movement (4/5) good  -CN    Hip ABduction Gross Movement (4+/5) good plus  -CN    Hip ADduction Gross Movement (4/5) good  -CN    Left Knee    Knee Extension Gross Movement (4/5) good  -CN    Knee Flexion Gross Movement (4/5) good  -CN    Right Knee    Knee Extension Gross Movement (4+/5) good plus  -CN    Knee Flexion Gross Movement (4+/5) good plus  -CN    Left Ankle/Foot    Ankle PF Gross Movement (4+/5) good plus  -CN    Ankle Dorsiflexion Gross Movement (4+/5) good plus  -CN    Right Ankle/Foot    Ankle PF Gross Movement (5/5) normal  -CN    Ankle Dorsiflexion Gross Movement (5/5) normal  -CN    Lower Extremity Flexibility    Hamstrings Moderately limited  -CN    Hip Flexors Mildly limited  -CN    Hip External Rotators Moderately limited  -CN    Hip Internal Rotators Mildly limited  -CN    Gait Assessment/Treatment    Gait, Comment Pt ambulates with SPC/Rwx at times, slight antalgia noted at times, dec stance time on L LE  -CN      User Key  (r) = Recorded By, (t) = Taken By, (c) = Cosigned By    Initials Name Provider Type    BRAEDEN Smith, PT Physical Therapist                            PT Assessment/Plan       02/01/18 9620       PT Assessment    Assessment Comments Pt returns for first follow up visit today reporting low level pain in L hip but pain in L groin and L knee. She is wearing L knee brace. Advised pt on fit of brace. Pt doing well with all LLE strengthening activities. Added gait in // bars as pt demonstrates L hip hike and dec L heel strike. Good isolation of glutes with prone glute set. Pt demonstrating signs and symtpoms of lumbar referral as decompression position and LTR reduced L neville pain.   -LS     PT Plan    PT Plan Comments Continue standing exercises B to improve L hip stability, assess tolerance to [employer] sne[employer] class and HEP. Consider adding ankle weights and repetitions as pt is progressing well.   -LS       User Key  (r) = Recorded By,  (t) = Taken By, (c) = Cosigned By    Initials Name Provider Type    LS Ludmila Stewart, PT Physical Therapist                Modalities       02/01/18 1600          Ice    Ice Applied Yes  -LS      Location L hip  -LS      Rx Minutes 10 mins  -LS      Ice S/P Rx Yes  -LS        User Key  (r) = Recorded By, (t) = Taken By, (c) = Cosigned By    Initials Name Provider Type    LS Ludmila Stewart, KEVIN Physical Therapist                Exercises       02/01/18 1600          Subjective Comments    Subjective Comments I am doing ok. I have to get ready to travel. I did the silver sneakers class yesterday but I just did the seated stuff.  -LS      Subjective Pain    Able to rate subjective pain? yes  -LS      Pre-Treatment Pain Level 3  -LS      Subjective Pain Comment My hip has been fine. It is my knee that is bothering me.  -LS      Exercise 1    Exercise Name 1 Piriformis/hip capsule stretch  -LS      Cueing 1 Demo  -LS      Reps 1 3  -LS      Time (Seconds) 1 20  -LS      Exercise 2    Exercise Name 2 Bridges  -LS      Cueing 2 Verbal  -LS      Reps 2 15  -LS      Exercise 3    Exercise Name 3 HL hip abduction  -LS      Cueing 3 Verbal  -LS      Reps 3 20  -LS      Additional Comments 10 B/ 10 uni  -LS      Exercise 4    Exercise Name 4 Seated HS stretch  -LS      Cueing 4 Demo  -LS      Reps 4 3  -LS      Time (Seconds) 4 20  -LS      Exercise 5    Exercise Name 5 walking in // bars forward/backward, lateral  -LS      Reps 5 4  -LS      Exercise 6    Exercise Name 6 calf raises  -LS      Reps 6 20  -LS      Exercise 7    Exercise Name 7 standing HS curl  -LS      Reps 7 12  -LS      Additional Comments B  -LS      Exercise 8    Exercise Name 8 standing abduction  -LS      Reps 8 12  -LS      Additional Comments B  -LS      Exercise 9    Exercise Name 9 PPT  -LS      Reps 9 15  -LS      Time (Seconds) 9 5  -LS      Exercise 10    Exercise Name 10 LTR blue ball  -LS      Reps 10 20  -LS      Exercise 11    Exercise Name 11  prone HS curl  -LS      Reps 11 12  -LS      Exercise 12    Exercise Name 12 prone glute set  -LS      Reps 12 15  -LS      Time (Seconds) 12 5  -LS      Exercise 13    Exercise Name 13 blue ball roll in  -LS      Reps 13 20  -LS        User Key  (r) = Recorded By, (t) = Taken By, (c) = Cosigned By    Initials Name Provider Type    ABDIEL Stewart PT Physical Therapist                               PT OP Goals       02/01/18 1700       PT Short Term Goals    STG Date to Achieve 02/13/18  -LS     STG 1 Pt will report subjective pain at 2/10 or less to demonstrate symptom management with ADLs and recreational activities.   -LS     STG 1 Progress Ongoing  -LS     STG 1 Progress Comments Pain in L knee and groin today.  -LS     STG 2 Pt will be independent and compliant with HEP in order to facilitate self-management of symptoms.   -LS     STG 2 Progress Progressing  -LS     STG 2 Progress Comments Pt doing well with HEP. Progressed today.  -LS     Long Term Goals    LTG Date to Achieve 02/27/18  -LS     LTG 1 Pt will demonstrate increased gross hip strength to 4+/5 on left side in order to decrease UE use for functional mobility.   -LS     LTG 1 Progress Ongoing  -LS     LTG 2 Pt will ambulate without AD community distances with normalized gait pattern in order to facilitate return to recreational activities.   -LS     LTG 2 Progress Ongoing  -LS     LTG 3 Pt will improve score on LEFS  to 65% for improved functional mobility.   -LS     LTG 3 Progress Ongoing  -LS     LTG 4 Pt will report 50% reduction in symptoms into L knee and shin.   -LS     LTG 4 Progress Ongoing  -LS       User Key  (r) = Recorded By, (t) = Taken By, (c) = Cosigned By    Initials Name Provider Type    ABDIEL Stewart PT Physical Therapist          Therapy Education  Education Details: discussed decompression position to relieve LBP, groin pain, progressed HEP  Given: Symptoms/condition management, HEP  Program: Reinforced  How Provided:  Verbal, Demonstration, Written  Provided to: Patient  Level of Understanding: Teach back education performed, Verbalized, Demonstrated              Time Calculation:   Start Time: 1600  Stop Time: 1700  Time Calculation (min): 60 min    Therapy Charges for Today     Code Description Service Date Service Provider Modifiers Qty    27479454257 HC PT THER PROC EA 15 MIN 2/1/2018 Ludmila Stewart, PT GP 3    86945698944 HC PT HOT OR COLD PACK TREAT MCARE 2/1/2018 Ludmila Stewart, PT GP 1                    Ludmila Stewart PT  2/1/2018

## 2018-02-06 ENCOUNTER — HOSPITAL ENCOUNTER (OUTPATIENT)
Dept: PHYSICAL THERAPY | Facility: HOSPITAL | Age: 73
Setting detail: THERAPIES SERIES
Discharge: HOME | End: 2018-02-06

## 2018-02-06 DIAGNOSIS — Z96.642 HISTORY OF TOTAL LEFT HIP ARTHROPLASTY: ICD-10-CM

## 2018-02-06 DIAGNOSIS — R26.2 DIFFICULTY WALKING: ICD-10-CM

## 2018-02-06 DIAGNOSIS — Z78.9 DIFFICULTY WITH ACTIVITIES OF DAILY LIVING: ICD-10-CM

## 2018-02-06 DIAGNOSIS — M25.552 LEFT HIP PAIN: Primary | ICD-10-CM

## 2018-02-06 PROCEDURE — 97110 THERAPEUTIC EXERCISES: CPT

## 2018-02-06 NOTE — THERAPY TREATMENT NOTE
Outpatient Physical Therapy Ortho Treatment Note  [facility]     Patient Name: Gloria Álvarez  : 1945  MRN: [id number]  Today's Date: 2018      Visit Date: 2018    Visit Dx:    ICD-10-CM ICD-9-CM   1. Left hip pain M25.552 719.45   2. History of total left hip arthroplasty Z96.642 V43.64   3. Difficulty walking R26.2 719.7   4. Difficulty with activities of daily living R53.81 799.3       Patient Active Problem List   Diagnosis   • Papillary carcinoma of thyroid   • Atrophic vaginitis   • Chronic interstitial cystitis   • Hyperlipidemia   • Insomnia   • Postmenopausal status   • Postoperative hypothyroidism   • Hx of benign carcinoid tumor   • Hx of diverticulitis of colon   • Age-related osteoporosis without current pathological fracture   • Closed subcapital fracture of left femur   • Hypokalemia        Past Medical History:   Diagnosis Date   • Depression     Patient states she's not had depressionn   • Diverticulosis    • Malignant carcinoid tumor of rectum 2007   • Osteoporosis    • Papillary carcinoma of thyroid    • Personal history of asthma         Past Surgical History:   Procedure Laterality Date   • CATARACT EXTRACTION Bilateral    • CHOLECYSTECTOMY     • COLONOSCOPY  2012    complete   • COLONOSCOPY N/A 2017    Procedure: COLONOSCOPY TO CECUM;  Surgeon: Andres Lambert MD;  Location: [facility] ENDOSCOPY;  Service:    • RECTAL TUMOR BY PROCTOTOMY EXCISION      CARCINOID   • TOTAL HIP ARTHROPLASTY Left 2017    Procedure: TOTAL HIP ARTHROPLASTY ANTERIOR WITH HANA TABLE;  Surgeon: Oral Ford MD;  Location: [facility] MAIN OR;  Service:    • TOTAL THYROIDECTOMY      7/3/12 ESTEFANIA / DR DEE/ 3CM PAP MET  NODULE RIGHT LOBE / LEFT LOBE -                             PT Assessment/Plan       18 1623       PT Assessment    Assessment Comments Pt with increased pain over the weekend possibly due to overdoing exercises. Discussed activity modification and  advised pt to perform strengthening exercises only every other day to allow muscles time to recover. Pt to perform stretches and gentle strengthening every day and decrease standing strengthening and balance exercises to every other day.   -CN     PT Plan    PT Plan Comments Assess response to exercise schedule and progress exercises as appropriate.   -CN       User Key  (r) = Recorded By, (t) = Taken By, (c) = Cosigned By    Initials Name Provider Type    BRAEDEN Smith, PT Physical Therapist                Modalities       02/06/18 1500          Ice    Ice Applied Yes  -CN      Location L hip  -CN      Rx Minutes 10 mins  -CN      Ice S/P Rx Yes  -CN        User Key  (r) = Recorded By, (t) = Taken By, (c) = Cosigned By    Initials Name Provider Type    BRAEDEN Smith, PT Physical Therapist                Exercises       02/06/18 1500          Subjective Comments    Subjective Comments I think I am overdoing it at home. It has been really sore over the past few days. I went to [facility] this morning and just did the seated upper body exercises.   -CN      Subjective Pain    Able to rate subjective pain? yes  -CN      Pre-Treatment Pain Level 1  -CN      Exercise 1    Exercise Name 1 Piriformis/hip capsule stretch  -CN      Cueing 1 Demo  -CN      Reps 1 3  -CN      Time (Seconds) 1 20  -CN      Exercise 2    Exercise Name 2 Bridges  -CN      Cueing 2 Verbal  -CN      Reps 2 15  -CN      Exercise 4    Exercise Name 4 Seated HS stretch  -CN      Cueing 4 Demo  -CN      Reps 4 3  -CN      Time (Seconds) 4 20  -CN      Exercise 9    Exercise Name 9 PPT  -CN      Reps 9 15  -CN      Time (Seconds) 9 5  -CN      Exercise 10    Exercise Name 10 LTR blue ball  -CN      Reps 10 20  -CN      Exercise 11    Exercise Name 11 prone HS curl  -CN      Reps 11 12  -CN      Exercise 12    Exercise Name 12 prone glute set  -CN      Reps 12 15  -CN      Time (Seconds) 12 5  -CN      Exercise 13    Exercise  Name 13 blue ball roll in  -CN      Reps 13 20  -CN      Exercise 14    Exercise Name 14 SL clamshells  -CN      Cueing 14 Verbal  -CN      Sets 14 2  -CN      Reps 14 10  -CN      Additional Comments L only  -CN        User Key  (r) = Recorded By, (t) = Taken By, (c) = Cosigned By    Initials Name Provider Type    BRAEDEN Smith, PT Physical Therapist                               PT OP Goals       02/06/18 1600       PT Short Term Goals    STG Date to Achieve 02/13/18  -CN     STG 1 Pt will report subjective pain at 2/10 or less to demonstrate symptom management with ADLs and recreational activities.   -CN     STG 1 Progress Ongoing  -CN     STG 2 Pt will be independent and compliant with HEP in order to facilitate self-management of symptoms.   -CN     STG 2 Progress Met  -CN     STG 2 Progress Comments Discussed decreasing HEP to every other day.  -CN     Long Term Goals    LTG Date to Achieve 02/27/18  -CN     LTG 1 Pt will demonstrate increased gross hip strength to 4+/5 on left side in order to decrease UE use for functional mobility.   -CN     LTG 1 Progress Ongoing  -CN     LTG 2 Pt will ambulate without AD community distances with normalized gait pattern in order to facilitate return to recreational activities.   -CN     LTG 2 Progress Ongoing  -CN     LTG 3 Pt will improve score on LEFS  to 65% for improved functional mobility.   -CN     LTG 3 Progress Ongoing  -CN     LTG 4 Pt will report 50% reduction in symptoms into L knee and shin.   -CN     LTG 4 Progress Ongoing  -CN       User Key  (r) = Recorded By, (t) = Taken By, (c) = Cosigned By    Initials Name Provider Type    BRAEDEN Smith, KEVIN Physical Therapist          Therapy Education  Given: Symptoms/condition management, HEP  Program: Reinforced  How Provided: Verbal, Demonstration, Written  Provided to: Patient  Level of Understanding: Teach back education performed, Verbalized, Demonstrated              Time Calculation:    Start Time: 1530  Stop Time: 1625  Time Calculation (min): 55 min    Therapy Charges for Today     Code Description Service Date Service Provider Modifiers Qty    88163938347 HC PT THER PROC EA 15 MIN 2/6/2018 Pooja Smiht, PT GP 3    94023400993 HC PT HOT OR COLD PACK TREAT MCARE 2/6/2018 Pooja Smith, PT GP 1                    Pooja Smith, PT  2/6/2018

## 2018-02-08 ENCOUNTER — TELEPHONE (OUTPATIENT)
Dept: ORTHOPEDIC SURGERY | Facility: CLINIC | Age: 73
End: 2018-02-08

## 2018-02-08 ENCOUNTER — HOSPITAL ENCOUNTER (OUTPATIENT)
Dept: PHYSICAL THERAPY | Facility: HOSPITAL | Age: 73
Setting detail: THERAPIES SERIES
Discharge: HOME | End: 2018-02-08

## 2018-02-08 DIAGNOSIS — R26.2 DIFFICULTY WALKING: ICD-10-CM

## 2018-02-08 DIAGNOSIS — Z78.9 DIFFICULTY WITH ACTIVITIES OF DAILY LIVING: ICD-10-CM

## 2018-02-08 DIAGNOSIS — Z96.642 HISTORY OF TOTAL LEFT HIP ARTHROPLASTY: ICD-10-CM

## 2018-02-08 DIAGNOSIS — M25.552 LEFT HIP PAIN: Primary | ICD-10-CM

## 2018-02-08 PROCEDURE — 97110 THERAPEUTIC EXERCISES: CPT

## 2018-02-09 NOTE — THERAPY TREATMENT NOTE
Outpatient Physical Therapy Ortho Treatment Note  [facility]     Patient Name: Gloria Álvarez  : 1945  MRN: [id number]  Today's Date: 2018      Visit Date: 2018    Visit Dx:    ICD-10-CM ICD-9-CM   1. Left hip pain M25.552 719.45   2. History of total left hip arthroplasty Z96.642 V43.64   3. Difficulty walking R26.2 719.7   4. Difficulty with activities of daily living R53.81 799.3       Patient Active Problem List   Diagnosis   • Papillary carcinoma of thyroid   • Atrophic vaginitis   • Chronic interstitial cystitis   • Hyperlipidemia   • Insomnia   • Postmenopausal status   • Postoperative hypothyroidism   • Hx of benign carcinoid tumor   • Hx of diverticulitis of colon   • Age-related osteoporosis without current pathological fracture   • Closed subcapital fracture of left femur   • Hypokalemia        Past Medical History:   Diagnosis Date   • Depression     Patient states she's not had depressionn   • Diverticulosis    • Malignant carcinoid tumor of rectum 2007   • Osteoporosis    • Papillary carcinoma of thyroid    • Personal history of asthma         Past Surgical History:   Procedure Laterality Date   • CATARACT EXTRACTION Bilateral    • CHOLECYSTECTOMY     • COLONOSCOPY  2012    complete   • COLONOSCOPY N/A 2017    Procedure: COLONOSCOPY TO CECUM;  Surgeon: Andres Lambert MD;  Location: [facility] ENDOSCOPY;  Service:    • RECTAL TUMOR BY PROCTOTOMY EXCISION      CARCINOID   • TOTAL HIP ARTHROPLASTY Left 2017    Procedure: TOTAL HIP ARTHROPLASTY ANTERIOR WITH HANA TABLE;  Surgeon: Oral Ford MD;  Location: [facility] MAIN OR;  Service:    • TOTAL THYROIDECTOMY      7/3/12 ESTEFANIA / DR DEE/ 3CM PAP MET  NODULE RIGHT LOBE / LEFT LOBE -                             PT Assessment/Plan       18 0901       PT Assessment    Assessment Comments Pt sore after sleeping still and not moving. Stiffness decreased with stretching exercises. Pt with some pain  after performing edilia edilia in // bars. Encouraged continued every other day strengthening exercises with adequate rest and ice time following. Increased height of SC due to pt demonstrating lateral flexion with ambulation.   -LS     PT Plan    PT Plan Comments Continue to assess exercise response. LLE strengthening, flexibility, and gait training.   -LS       User Key  (r) = Recorded By, (t) = Taken By, (c) = Cosigned By    Initials Name Provider Type    ABDIEL Stewart, PT Physical Therapist                Modalities       02/08/18 1600          Ice    Ice Applied Yes  -LS      Location L hip  -LS      Rx Minutes 10 mins  -LS      Ice S/P Rx Yes  -LS        User Key  (r) = Recorded By, (t) = Taken By, (c) = Cosigned By    Initials Name Provider Type    ABDIEL Stewart PT Physical Therapist                Exercises       02/08/18 1600          Subjective Comments    Subjective Comments I slept 5 hours straight and didn't move and I was so sore this morning.  -LS      Subjective Pain    Able to rate subjective pain? yes  -LS      Pre-Treatment Pain Level 5  -LS      Exercise 1    Exercise Name 1 Piriformis/hip capsule stretch  -LS      Cueing 1 Demo  -LS      Reps 1 3  -LS      Time (Seconds) 1 20  -LS      Exercise 2    Exercise Name 2 Bridges  -LS      Cueing 2 Verbal  -LS      Reps 2 15  -LS      Exercise 3    Exercise Name 3 HL hip abduction  -LS      Cueing 3 Verbal  -LS      Reps 3 20  -LS      Additional Comments RTB; 10 B/10 uni  -LS      Exercise 4    Exercise Name 4 stair HS stretch  -LS      Cueing 4 Demo  -LS      Reps 4 3  -LS      Time (Seconds) 4 20  -LS      Exercise 5    Exercise Name 5 edilia edilia in // bars  -LS      Exercise 9    Exercise Name 9 PPT with iso hip adduction  -LS      Reps 9 15  -LS      Time (Seconds) 9 5  -LS      Exercise 10    Exercise Name 10 LTR blue ball  -LS      Reps 10 20  -LS      Exercise 11    Exercise Name 11 prone HS curl  -LS      Reps 11 15  -LS      Additional Comments  2#  -LS      Exercise 12    Exercise Name 12 prone glute set  -LS      Reps 12 15  -LS      Time (Seconds) 12 5  -LS      Exercise 13    Exercise Name 13 blue ball roll in  -LS      Reps 13 20  -LS      Exercise 14    Exercise Name 14 SL clamshells  -LS      Cueing 14 Verbal  -LS      Sets 14 2  -LS      Reps 14 10  -LS      Additional Comments L only  -LS      Exercise 15    Exercise Name 15 SKTC  -LS      Reps 15 3  -LS      Time (Seconds) 15 20  -LS      Exercise 16    Exercise Name 16 prone alt superman  -LS      Reps 16 10  -LS      Additional Comments L glute set/ R UE lift  -LS        User Key  (r) = Recorded By, (t) = Taken By, (c) = Cosigned By    Initials Name Provider Type    LS Ludmila Stewart PT Physical Therapist                             Therapy Education  Given: Symptoms/condition management  Program: Reinforced  How Provided: Verbal, Demonstration  Provided to: Patient  Level of Understanding: Teach back education performed, Verbalized, Demonstrated              Time Calculation:   Start Time: 1600  Stop Time: 1650  Time Calculation (min): 50 min    Therapy Charges for Today     Code Description Service Date Service Provider Modifiers Qty    51050929501 HC PT THER PROC EA 15 MIN 2/8/2018 Ludmila Stewart, PT GP 3    83918981094 HC PT HOT OR COLD PACK TREAT MCARE 2/8/2018 Ludmila Stewart, PT GP 1                    Ludmila Stewart PT  2/9/2018

## 2018-02-13 ENCOUNTER — HOSPITAL ENCOUNTER (OUTPATIENT)
Dept: PHYSICAL THERAPY | Facility: HOSPITAL | Age: 73
Setting detail: THERAPIES SERIES
Discharge: HOME | End: 2018-02-13

## 2018-02-13 DIAGNOSIS — R26.2 DIFFICULTY WALKING: ICD-10-CM

## 2018-02-13 DIAGNOSIS — Z96.642 HISTORY OF TOTAL LEFT HIP ARTHROPLASTY: ICD-10-CM

## 2018-02-13 DIAGNOSIS — M25.552 LEFT HIP PAIN: Primary | ICD-10-CM

## 2018-02-13 DIAGNOSIS — Z78.9 DIFFICULTY WITH ACTIVITIES OF DAILY LIVING: ICD-10-CM

## 2018-02-13 PROCEDURE — 97140 MANUAL THERAPY 1/> REGIONS: CPT

## 2018-02-13 PROCEDURE — 97110 THERAPEUTIC EXERCISES: CPT

## 2018-02-13 NOTE — THERAPY TREATMENT NOTE
Outpatient Physical Therapy Ortho Treatment Note  [facility]     Patient Name: Gloria Álvarez  : 1945  MRN: [id number]  Today's Date: 2018      Visit Date: 2018    Visit Dx:    ICD-10-CM ICD-9-CM   1. Left hip pain M25.552 719.45   2. History of total left hip arthroplasty Z96.642 V43.64   3. Difficulty walking R26.2 719.7   4. Difficulty with activities of daily living R53.81 799.3       Patient Active Problem List   Diagnosis   • Papillary carcinoma of thyroid   • Atrophic vaginitis   • Chronic interstitial cystitis   • Hyperlipidemia   • Insomnia   • Postmenopausal status   • Postoperative hypothyroidism   • Hx of benign carcinoid tumor   • Hx of diverticulitis of colon   • Age-related osteoporosis without current pathological fracture   • Closed subcapital fracture of left femur   • Hypokalemia        Past Medical History:   Diagnosis Date   • Depression     Patient states she's not had depressionn   • Diverticulosis    • Malignant carcinoid tumor of rectum 2007   • Osteoporosis    • Papillary carcinoma of thyroid    • Personal history of asthma         Past Surgical History:   Procedure Laterality Date   • CATARACT EXTRACTION Bilateral    • CHOLECYSTECTOMY     • COLONOSCOPY  2012    complete   • COLONOSCOPY N/A 2017    Procedure: COLONOSCOPY TO CECUM;  Surgeon: Andres Lambert MD;  Location: [facility] ENDOSCOPY;  Service:    • RECTAL TUMOR BY PROCTOTOMY EXCISION      CARCINOID   • TOTAL HIP ARTHROPLASTY Left 2017    Procedure: TOTAL HIP ARTHROPLASTY ANTERIOR WITH HANA TABLE;  Surgeon: Oral Ford MD;  Location: [facility] MAIN OR;  Service:    • TOTAL THYROIDECTOMY      7/3/12 ESTEFANIA / DR DEE/ 3CM PAP MET  NODULE RIGHT LOBE / LEFT LOBE -                             PT Assessment/Plan       18 1625       PT Assessment    Assessment Comments Pt reports continued pain radiating into L thigh and shin region. Initiated stm to L piriformis/glute med  tissues, and pt reports reduced symptoms at end of therapy session. Continued with stretching and hip strengtheningn and pt compliant with HEP.   -CN     PT Plan    PT Plan Comments Assess response to stm and continue if beneficial.   -CN       User Key  (r) = Recorded By, (t) = Taken By, (c) = Cosigned By    Initials Name Provider Type    BRAEDEN Smith, PT Physical Therapist                Modalities       02/13/18 1500          Ice    Ice Applied Yes  -CN      Location L hip  -CN      Rx Minutes 10 mins  -CN      Ice S/P Rx Yes  -CN        User Key  (r) = Recorded By, (t) = Taken By, (c) = Cosigned By    Initials Name Provider Type    BRAEDEN Smith, PT Physical Therapist                Exercises       02/13/18 1500          Subjective Comments    Subjective Comments My hip feels fine, but the pain down the leg is still really bothering me.   -CN      Subjective Pain    Able to rate subjective pain? yes  -CN      Pre-Treatment Pain Level 3  -CN      Exercise 1    Exercise Name 1 Piriformis/hip capsule stretch  -CN      Cueing 1 Demo  -CN      Reps 1 3  -CN      Time (Seconds) 1 20  -CN      Exercise 2    Exercise Name 2 Bridges  -CN      Cueing 2 Verbal  -CN      Reps 2 15  -CN      Exercise 4    Exercise Name 4 stair HS stretch  -CN      Cueing 4 Demo  -CN      Reps 4 3  -CN      Time (Seconds) 4 20  -CN      Exercise 9    Exercise Name 9 PPT with iso hip adduction  -CN      Reps 9 20  -CN      Time (Seconds) 9 5  -CN      Exercise 10    Exercise Name 10 LTR blue ball  -CN      Reps 10 20  -CN      Exercise 12    Exercise Name 12 prone glute set  -CN      Reps 12 20  -CN      Time (Seconds) 12 5  -CN      Exercise 13    Exercise Name 13 blue ball roll in  -CN      Reps 13 20  -CN      Exercise 14    Exercise Name 14 SL clamshells  -CN      Cueing 14 Verbal  -CN      Sets 14 2  -CN      Reps 14 10  -CN      Additional Comments L only  -CN      Exercise 15    Exercise Name 15 SKTC  -CN       Reps 15 3  -CN      Time (Seconds) 15 20  -CN      Exercise 16    Exercise Name 16 prone alt superman  -CN      Reps 16 10  -CN      Additional Comments Stopped after 4 with the L UE/R LE due to spasm in thoracic/lumbar paraspinals  -CN        User Key  (r) = Recorded By, (t) = Taken By, (c) = Cosigned By    Initials Name Provider Type    BRAEDEN Smith, PT Physical Therapist                        Manual Rx (last 36 hours)      Manual Treatments       02/13/18 1500          Manual Rx 1    Manual Rx 1 Location stm to L glute med and piriformis tissues, pt in R SLing  -CN      Manual Rx 1 Duration 12 min  -CN        User Key  (r) = Recorded By, (t) = Taken By, (c) = Cosigned By    Initials Name Provider Type    BRAEDEN Smith, PT Physical Therapist                PT OP Goals       02/13/18 1600       PT Short Term Goals    STG Date to Achieve 02/13/18  -CN     STG 1 Pt will report subjective pain at 2/10 or less to demonstrate symptom management with ADLs and recreational activities.   -CN     STG 1 Progress Ongoing  -CN     STG 1 Progress Comments Pt continues with pain radiating into L LE.   -CN     STG 2 Pt will be independent and compliant with HEP in order to facilitate self-management of symptoms.   -CN     STG 2 Progress Met  -CN     Long Term Goals    LTG Date to Achieve 02/27/18  -CN     LTG 1 Pt will demonstrate increased gross hip strength to 4+/5 on left side in order to decrease UE use for functional mobility.   -CN     LTG 1 Progress Ongoing  -CN     LTG 2 Pt will ambulate without AD community distances with normalized gait pattern in order to facilitate return to recreational activities.   -CN     LTG 2 Progress Ongoing  -CN     LTG 3 Pt will improve score on LEFS  to 65% for improved functional mobility.   -CN     LTG 3 Progress Ongoing  -CN     LTG 4 Pt will report 50% reduction in symptoms into L knee and shin.   -CN     LTG 4 Progress Ongoing  -CN       User Key  (r) =  Recorded By, (t) = Taken By, (c) = Cosigned By    Initials Name Provider Type    CN Pooja Smith, PT Physical Therapist          Therapy Education  Given: Symptoms/condition management  Program: Reinforced  How Provided: Verbal, Demonstration  Provided to: Patient  Level of Understanding: Teach back education performed, Verbalized, Demonstrated              Time Calculation:   Start Time: 1530  Stop Time: 1625  Time Calculation (min): 55 min    Therapy Charges for Today     Code Description Service Date Service Provider Modifiers Qty    97493437203  PT THER PROC EA 15 MIN 2/13/2018 Pooja Smith, PT GP 2    21741987429 HC PT MANUAL THERAPY EA 15 MIN 2/13/2018 Pooja Smith, PT GP 1    [id number]  PT HOT OR COLD PACK TREAT MCARE 2/13/2018 Pooja Smith, PT GP 1                    Pooja Smith, PT  2/13/2018

## 2018-02-15 ENCOUNTER — HOSPITAL ENCOUNTER (OUTPATIENT)
Dept: PHYSICAL THERAPY | Facility: HOSPITAL | Age: 73
Setting detail: THERAPIES SERIES
Discharge: HOME | End: 2018-02-15

## 2018-02-15 DIAGNOSIS — R26.2 DIFFICULTY WALKING: ICD-10-CM

## 2018-02-15 DIAGNOSIS — M25.552 LEFT HIP PAIN: Primary | ICD-10-CM

## 2018-02-15 DIAGNOSIS — Z96.642 HISTORY OF TOTAL LEFT HIP ARTHROPLASTY: ICD-10-CM

## 2018-02-15 DIAGNOSIS — Z78.9 DIFFICULTY WITH ACTIVITIES OF DAILY LIVING: ICD-10-CM

## 2018-02-15 PROCEDURE — 97110 THERAPEUTIC EXERCISES: CPT

## 2018-02-15 PROCEDURE — 97140 MANUAL THERAPY 1/> REGIONS: CPT

## 2018-02-15 NOTE — THERAPY TREATMENT NOTE
Outpatient Physical Therapy Ortho Treatment Note  [facility]     Patient Name: Gloria Álvarez  : 1945  MRN: [id number]  Today's Date: 2/15/2018      Visit Date: 02/15/2018    Visit Dx:    ICD-10-CM ICD-9-CM   1. Left hip pain M25.552 719.45   2. History of total left hip arthroplasty Z96.642 V43.64   3. Difficulty walking R26.2 719.7   4. Difficulty with activities of daily living R53.81 799.3       Patient Active Problem List   Diagnosis   • Papillary carcinoma of thyroid   • Atrophic vaginitis   • Chronic interstitial cystitis   • Hyperlipidemia   • Insomnia   • Postmenopausal status   • Postoperative hypothyroidism   • Hx of benign carcinoid tumor   • Hx of diverticulitis of colon   • Age-related osteoporosis without current pathological fracture   • Closed subcapital fracture of left femur   • Hypokalemia        Past Medical History:   Diagnosis Date   • Depression     Patient states she's not had depressionn   • Diverticulosis    • Malignant carcinoid tumor of rectum 2007   • Osteoporosis    • Papillary carcinoma of thyroid    • Personal history of asthma         Past Surgical History:   Procedure Laterality Date   • CATARACT EXTRACTION Bilateral    • CHOLECYSTECTOMY     • COLONOSCOPY  2012    complete   • COLONOSCOPY N/A 2017    Procedure: COLONOSCOPY TO CECUM;  Surgeon: Andres Lambert MD;  Location: [facility] ENDOSCOPY;  Service:    • RECTAL TUMOR BY PROCTOTOMY EXCISION      CARCINOID   • TOTAL HIP ARTHROPLASTY Left 2017    Procedure: TOTAL HIP ARTHROPLASTY ANTERIOR WITH HANA TABLE;  Surgeon: Oral Ford MD;  Location: [facility] MAIN OR;  Service:    • TOTAL THYROIDECTOMY      7/3/12 ESTEFANIA / DR DEE/ 3CM PAP MET  NODULE RIGHT LOBE / LEFT LOBE -                             PT Assessment/Plan       02/15/18 0902       PT Assessment    Assessment Comments Pt reports improvement in radicular symptoms since last visit likely due to addition of stm. Continued with  manual work to L piriformis and glute med tissues with decreased trigger points noted in this region. Performed gait training around clinic without AD today and used mirror for visual input to dec mild [place] with ambulation.   -CN     PT Plan    PT Plan Comments Assess reseponse to stm and continue if appropriate. Continue with gait training without AD as needed.   -CN       User Key  (r) = Recorded By, (t) = Taken By, (c) = Cosigned By    Initials Name Provider Type    BRAEDEN Smith, PT Physical Therapist                Modalities       02/15/18 1500          Ice    Ice Applied Yes  -CN      Location L hip  -CN      Rx Minutes 10 mins  -CN      Ice S/P Rx Yes  -CN        User Key  (r) = Recorded By, (t) = Taken By, (c) = Cosigned By    Initials Name Provider Type    BRAEDEN Smith, PT Physical Therapist                Exercises       02/15/18 1600          Subjective Comments    Subjective Comments It was sore after last time and started hurting just a little yesterday after lunch. I took one Tylenol yesterday and one day. I do think it is better since you did the massage last time.   -CN      Subjective Pain    Able to rate subjective pain? yes  -CN      Pre-Treatment Pain Level 1  -CN      Exercise 1    Exercise Name 1 Piriformis/hip capsule stretch  -CN      Cueing 1 Demo  -CN      Reps 1 3  -CN      Time (Seconds) 1 20  -CN      Exercise 2    Exercise Name 2 Bridges  -CN      Cueing 2 Verbal  -CN      Sets 2 2  -CN      Reps 2 10  -CN      Exercise 6    Exercise Name 6 Gait training without AD around clinic  -CN      Cueing 6 Verbal  -CN      Time (Minutes) 6 5  -CN      Additional Comments Cueing on glute activation and using mirror for visual input  -CN      Exercise 9    Exercise Name 9 PPT with iso hip adduction  -CN      Reps 9 20  -CN      Time (Seconds) 9 5  -CN      Exercise 10    Exercise Name 10 LTR blue ball  -CN      Reps 10 20  -CN      Exercise 12    Exercise Name  12 prone glute set  -CN      Reps 12 20  -CN      Time (Seconds) 12 5  -CN      Exercise 13    Exercise Name 13 blue ball roll in  -CN      Reps 13 20  -CN      Exercise 14    Exercise Name 14 SL clamshells  -CN      Cueing 14 Verbal  -CN      Sets 14 2  -CN      Reps 14 10  -CN      Additional Comments L only  -CN      Exercise 15    Exercise Name 15 SKTC  -CN      Reps 15 3  -CN      Time (Seconds) 15 20  -CN        User Key  (r) = Recorded By, (t) = Taken By, (c) = Cosigned By    Initials Name Provider Type    BRAEDEN Smith, KEVIN Physical Therapist                        Manual Rx (last 36 hours)      Manual Treatments       02/15/18 1500          Manual Rx 1    Manual Rx 1 Location stm to L glute med and piriformis tissues, pt in R SLing  -CN      Manual Rx 1 Duration 12 min  -CN        User Key  (r) = Recorded By, (t) = Taken By, (c) = Cosigned By    Initials Name Provider Type    BRAEDEN Smith PT Physical Therapist              Therapy Education  Given: Symptoms/condition management  Program: Reinforced  How Provided: Verbal, Demonstration  Provided to: Patient  Level of Understanding: Teach back education performed, Verbalized, Demonstrated              Time Calculation:   Start Time: 1615  Stop Time: 1710  Time Calculation (min): 55 min    Therapy Charges for Today     Code Description Service Date Service Provider Modifiers Qty    78859139438 HC PT THER PROC EA 15 MIN 2/15/2018 Pooja Smith, PT GP 2    73219796617 HC PT MANUAL THERAPY EA 15 MIN 2/15/2018 Pooja Smith, PT GP 1    12332077063 HC PT HOT OR COLD PACK TREAT MCARE 2/15/2018 Pooja Smith, PT GP 1                    Pooja Smith PT  2/15/2018

## 2018-02-20 ENCOUNTER — HOSPITAL ENCOUNTER (OUTPATIENT)
Dept: PHYSICAL THERAPY | Facility: HOSPITAL | Age: 73
Setting detail: THERAPIES SERIES
Discharge: HOME | End: 2018-02-20

## 2018-02-20 DIAGNOSIS — M25.552 LEFT HIP PAIN: Primary | ICD-10-CM

## 2018-02-20 DIAGNOSIS — Z78.9 DIFFICULTY WITH ACTIVITIES OF DAILY LIVING: ICD-10-CM

## 2018-02-20 DIAGNOSIS — Z96.642 HISTORY OF TOTAL LEFT HIP ARTHROPLASTY: ICD-10-CM

## 2018-02-20 DIAGNOSIS — R26.2 DIFFICULTY WALKING: ICD-10-CM

## 2018-02-20 PROCEDURE — 97140 MANUAL THERAPY 1/> REGIONS: CPT

## 2018-02-20 PROCEDURE — 97110 THERAPEUTIC EXERCISES: CPT

## 2018-02-20 NOTE — THERAPY TREATMENT NOTE
Outpatient Physical Therapy Ortho Treatment Note  [facility]     Patient Name: Gloria Álvarez  : 1945  MRN: [id number]  Today's Date: 2018      Visit Date: 2018    Visit Dx:    ICD-10-CM ICD-9-CM   1. Left hip pain M25.552 719.45   2. History of total left hip arthroplasty Z96.642 V43.64   3. Difficulty walking R26.2 719.7   4. Difficulty with activities of daily living R53.81 799.3       Patient Active Problem List   Diagnosis   • Papillary carcinoma of thyroid   • Atrophic vaginitis   • Chronic interstitial cystitis   • Hyperlipidemia   • Insomnia   • Postmenopausal status   • Postoperative hypothyroidism   • Hx of benign carcinoid tumor   • Hx of diverticulitis of colon   • Age-related osteoporosis without current pathological fracture   • Closed subcapital fracture of left femur   • Hypokalemia        Past Medical History:   Diagnosis Date   • Depression     Patient states she's not had depressionn   • Diverticulosis    • Malignant carcinoid tumor of rectum 2007   • Osteoporosis    • Papillary carcinoma of thyroid    • Personal history of asthma         Past Surgical History:   Procedure Laterality Date   • CATARACT EXTRACTION Bilateral    • CHOLECYSTECTOMY     • COLONOSCOPY  2012    complete   • COLONOSCOPY N/A 2017    Procedure: COLONOSCOPY TO CECUM;  Surgeon: Andres Lambert MD;  Location: [facility] ENDOSCOPY;  Service:    • RECTAL TUMOR BY PROCTOTOMY EXCISION      CARCINOID   • TOTAL HIP ARTHROPLASTY Left 2017    Procedure: TOTAL HIP ARTHROPLASTY ANTERIOR WITH HANA TABLE;  Surgeon: Oral Ford MD;  Location: [facility] MAIN OR;  Service:    • TOTAL THYROIDECTOMY      7/3/12 ESTEFANIA / DR DEE/ 3CM PAP MET  NODULE RIGHT LOBE / LEFT LOBE -                             PT Assessment/Plan       18 1618       PT Assessment    Assessment Comments Pt reports improvement in symptoms with stm until this morning when pain seemed to return. Performed stm and  stretching and pt reports relief of symptoms at end of treatment session. Pt planning to go on 2 month vacation next week and will plan to D/C at that time to independent HEP.   -CN     PT Plan    PT Plan Comments Assess response to added exercises and progress as appropriate. Educate on tennis ball self massage.   -CN       User Key  (r) = Recorded By, (t) = Taken By, (c) = Cosigned By    Initials Name Provider Type    BRAEDEN Smith, PT Physical Therapist                Modalities       02/20/18 1300          Ice    Ice Applied Yes  -CN      Location L hip  -CN      Rx Minutes 10 mins  -CN      Ice S/P Rx Yes  -CN        User Key  (r) = Recorded By, (t) = Taken By, (c) = Cosigned By    Initials Name Provider Type    BRAEDEN Smith, KEVIN Physical Therapist                Exercises       02/20/18 1400          Subjective Comments    Subjective Comments My butt is hurting more today. It felt really good all weekend then woke me up this morning.   -CN      Subjective Pain    Able to rate subjective pain? yes  -CN      Pre-Treatment Pain Level 4  -CN      Exercise 1    Exercise Name 1 Piriformis/hip capsule stretch  -CN      Cueing 1 Demo  -CN      Reps 1 3  -CN      Time (Seconds) 1 20  -CN      Exercise 2    Exercise Name 2 Bridges  -CN      Cueing 2 Verbal  -CN      Sets 2 2  -CN      Reps 2 10  -CN      Exercise 3    Exercise Name 3 Prone hip extension  -CN      Cueing 3 Demo  -CN      Sets 3 2  -CN      Reps 3 10  -CN      Exercise 5    Exercise Name 5 Stair hip extension stretch  -CN      Cueing 5 Demo  -CN      Reps 5 10  -CN      Time (Seconds) 5 10  -CN      Exercise 10    Exercise Name 10 LTR blue ball  -CN      Reps 10 20  -CN      Exercise 12    Exercise Name 12 prone glute set  -CN      Reps 12 20  -CN      Time (Seconds) 12 5  -CN      Exercise 13    Exercise Name 13 blue ball roll in  -CN      Reps 13 20  -CN      Exercise 14    Exercise Name 14 SL clamshells  -CN      Cueing 14  Verbal  -CN      Sets 14 2  -CN      Reps 14 10  -CN      Additional Comments L only  -CN      Exercise 17    Exercise Name 17 SL abduction  -CN      Cueing 17 Demo  -CN      Sets 17 2  -CN      Reps 17 10  -CN        User Key  (r) = Recorded By, (t) = Taken By, (c) = Cosigned By    Initials Name Provider Type    BRAEDEN Smith, PT Physical Therapist                        Manual Rx (last 36 hours)      Manual Treatments       02/20/18 1300          Manual Rx 1    Manual Rx 1 Location stm to L glute med and piriformis tissues, pt in R SLing  -CN      Manual Rx 1 Duration 12 min  -CN        User Key  (r) = Recorded By, (t) = Taken By, (c) = Cosigned By    Initials Name Provider Type    BRAEDEN Smith, PT Physical Therapist                PT OP Goals       02/20/18 1800       PT Short Term Goals    STG Date to Achieve 02/13/18  -CN     STG 1 Pt will report subjective pain at 2/10 or less to demonstrate symptom management with ADLs and recreational activities.   -CN     STG 1 Progress Ongoing  -CN     STG 1 Progress Comments Pt with increased pain into L buttock today.   -CN     STG 2 Pt will be independent and compliant with HEP in order to facilitate self-management of symptoms.   -CN     STG 2 Progress Met  -CN     Long Term Goals    LTG Date to Achieve 02/27/18  -CN     LTG 1 Pt will demonstrate increased gross hip strength to 4+/5 on left side in order to decrease UE use for functional mobility.   -CN     LTG 1 Progress Ongoing  -CN     LTG 2 Pt will ambulate without AD community distances with normalized gait pattern in order to facilitate return to recreational activities.   -CN     LTG 2 Progress Progressing  -CN     LTG 2 Progress Comments Pt using SPC at times with increased ambulation distances.   -CN     LTG 3 Pt will improve score on LEFS  to 65% for improved functional mobility.   -CN     LTG 3 Progress Ongoing  -CN     LTG 4 Pt will report 50% reduction in symptoms into L knee and  shin.   -CN     LTG 4 Progress Ongoing  -BRAEDEN     LTG 4 Progress Comments Pt reports great reduction in symptoms since last visit until this morning.   -CN       User Key  (r) = Recorded By, (t) = Taken By, (c) = Cosigned By    Initials Name Provider Type    BRAEDEN Smith, PT Physical Therapist          Therapy Education  Given: Symptoms/condition management  Program: Reinforced  How Provided: Verbal, Demonstration  Provided to: Patient  Level of Understanding: Teach back education performed, Verbalized, Demonstrated              Time Calculation:   Start Time: 1400  Stop Time: 1455  Time Calculation (min): 55 min    Therapy Charges for Today     Code Description Service Date Service Provider Modifiers Qty    19763415757 HC PT THER PROC EA 15 MIN 2/20/2018 Pooja Smith, PT GP 2    93544382571 HC PT HOT OR COLD PACK TREAT MCARE 2/20/2018 Pooja Smith, PT GP 1    19929315872 HC PT MANUAL THERAPY EA 15 MIN 2/20/2018 Pooja Smith, PT GP 1                    Pooja Smith PT  2/20/2018

## 2018-02-22 ENCOUNTER — OFFICE VISIT (OUTPATIENT)
Dept: ORTHOPEDIC SURGERY | Facility: CLINIC | Age: 73
End: 2018-02-22

## 2018-02-22 ENCOUNTER — HOSPITAL ENCOUNTER (OUTPATIENT)
Dept: PHYSICAL THERAPY | Facility: HOSPITAL | Age: 73
Setting detail: THERAPIES SERIES
Discharge: HOME | End: 2018-02-22

## 2018-02-22 VITALS — BODY MASS INDEX: 24.83 KG/M2 | TEMPERATURE: 97.9 F | WEIGHT: 149 LBS | HEIGHT: 65 IN

## 2018-02-22 DIAGNOSIS — Z96.642 HISTORY OF TOTAL LEFT HIP ARTHROPLASTY: ICD-10-CM

## 2018-02-22 DIAGNOSIS — M25.552 LEFT HIP PAIN: Primary | ICD-10-CM

## 2018-02-22 DIAGNOSIS — Z78.9 DIFFICULTY WITH ACTIVITIES OF DAILY LIVING: ICD-10-CM

## 2018-02-22 DIAGNOSIS — R26.2 DIFFICULTY WALKING: ICD-10-CM

## 2018-02-22 DIAGNOSIS — Z96.642 STATUS POST TOTAL REPLACEMENT OF LEFT HIP: Primary | ICD-10-CM

## 2018-02-22 PROCEDURE — 99024 POSTOP FOLLOW-UP VISIT: CPT | Performed by: NURSE PRACTITIONER

## 2018-02-22 PROCEDURE — 97140 MANUAL THERAPY 1/> REGIONS: CPT

## 2018-02-22 PROCEDURE — 97110 THERAPEUTIC EXERCISES: CPT

## 2018-02-22 PROCEDURE — 73502 X-RAY EXAM HIP UNI 2-3 VIEWS: CPT | Performed by: NURSE PRACTITIONER

## 2018-02-22 NOTE — THERAPY TREATMENT NOTE
Outpatient Physical Therapy Ortho Treatment Note  [facility]     Patient Name: Gloria Álvarez  : 1945  MRN: [id number]  Today's Date: 2018      Visit Date: 2018    Visit Dx:    ICD-10-CM ICD-9-CM   1. Left hip pain M25.552 719.45   2. History of total left hip arthroplasty Z96.642 V43.64   3. Difficulty walking R26.2 719.7   4. Difficulty with activities of daily living R53.81 799.3       Patient Active Problem List   Diagnosis   • Papillary carcinoma of thyroid   • Atrophic vaginitis   • Chronic interstitial cystitis   • Hyperlipidemia   • Insomnia   • Postmenopausal status   • Postoperative hypothyroidism   • Hx of benign carcinoid tumor   • Hx of diverticulitis of colon   • Age-related osteoporosis without current pathological fracture   • Closed subcapital fracture of left femur   • Hypokalemia        Past Medical History:   Diagnosis Date   • Depression     Patient states she's not had depressionn   • Diverticulosis    • Malignant carcinoid tumor of rectum 2007   • Osteoporosis    • Papillary carcinoma of thyroid    • Personal history of asthma         Past Surgical History:   Procedure Laterality Date   • CATARACT EXTRACTION Bilateral    • CHOLECYSTECTOMY     • COLONOSCOPY  2012    complete   • COLONOSCOPY N/A 2017    Procedure: COLONOSCOPY TO CECUM;  Surgeon: Andres Lambert MD;  Location: [facility] ENDOSCOPY;  Service:    • RECTAL TUMOR BY PROCTOTOMY EXCISION      CARCINOID   • TOTAL HIP ARTHROPLASTY Left 2017    Procedure: TOTAL HIP ARTHROPLASTY ANTERIOR WITH HANA TABLE;  Surgeon: Oral Ford MD;  Location: [facility] MAIN OR;  Service:    • TOTAL THYROIDECTOMY      7/3/12 ESTEFANIA / DR DEE/ 3CM PAP MET  NODULE RIGHT LOBE / LEFT LOBE -                             PT Assessment/Plan       18 0766       PT Assessment    Assessment Comments Pt continues to voice improvement with stm and exercises. Added resisted sidestepping today for hip  strenghtening which pt will add to HEP. Pt to leave for 2 month vacation next week and plan to D/C to independent management of symptoms next visit.   -CN     PT Plan    PT Plan Comments Plan to D/C to independent HEP next visit.   -CN       User Key  (r) = Recorded By, (t) = Taken By, (c) = Cosigned By    Initials Name Provider Type    BRAEDEN Smith, PT Physical Therapist                Modalities       02/22/18 1500          Ice    Ice Applied Yes  -CN      Location L hip  -CN      Rx Minutes 10 mins  -CN      Ice S/P Rx Yes  -CN        User Key  (r) = Recorded By, (t) = Taken By, (c) = Cosigned By    Initials Name Provider Type    BRAEDEN Smith, PT Physical Therapist                Exercises       02/22/18 1600          Subjective Comments    Subjective Comments I am tired today, but it is because I have been to the doctor, silver sneakers and running errands. I still feel good from the other day.   -CN      Subjective Pain    Able to rate subjective pain? yes  -CN      Pre-Treatment Pain Level 2  -CN      Exercise 1    Exercise Name 1 Piriformis/hip capsule stretch  -CN      Cueing 1 Demo  -CN      Reps 1 3  -CN      Time (Seconds) 1 20  -CN      Exercise 2    Exercise Name 2 Bridges  -CN      Cueing 2 Verbal  -CN      Sets 2 2  -CN      Reps 2 10  -CN      Exercise 3    Exercise Name 3 Prone hip extension  -CN      Cueing 3 Demo  -CN      Sets 3 2  -CN      Reps 3 10  -CN      Exercise 5    Exercise Name 5 Stair hip extension stretch  -CN      Cueing 5 Demo  -CN      Reps 5 10  -CN      Time (Seconds) 5 10  -CN      Exercise 10    Exercise Name 10 LTR blue ball  -CN      Reps 10 20  -CN      Exercise 12    Exercise Name 12 prone glute set  -CN      Reps 12 20  -CN      Time (Seconds) 12 5  -CN      Exercise 13    Exercise Name 13 blue ball roll in  -CN      Reps 13 20  -CN      Exercise 14    Exercise Name 14 SL clamshells  -CN      Cueing 14 Verbal  -CN      Sets 14 2  -CN      Reps 14  10  -CN      Additional Comments L only  -CN      Exercise 15    Exercise Name 15 SKTC  -CN      Reps 15 3  -CN      Time (Seconds) 15 20  -CN      Exercise 16    Exercise Name 16 Sidestepping resisted  -CN      Cueing 16 Demo  -CN      Reps 16 3   laps  -CN      Additional Comments RTB  -CN      Exercise 17    Exercise Name 17 SL abduction  -CN      Cueing 17 Demo  -CN      Sets 17 2  -CN      Reps 17 10  -CN        User Key  (r) = Recorded By, (t) = Taken By, (c) = Cosigned By    Initials Name Provider Type    BRAEDEN Smith, PT Physical Therapist                        Manual Rx (last 36 hours)      Manual Treatments       02/22/18 1500          Manual Rx 1    Manual Rx 1 Location stm to L glute med and piriformis tissues, pt in R SLing  -CN      Manual Rx 1 Duration 12 min  -CN        User Key  (r) = Recorded By, (t) = Taken By, (c) = Cosigned By    Initials Name Provider Type    BRAEDEN Smith PT Physical Therapist              Therapy Education  Given: Symptoms/condition management  Program: Reinforced  How Provided: Verbal, Demonstration  Provided to: Patient  Level of Understanding: Teach back education performed, Verbalized, Demonstrated              Time Calculation:   Start Time: 1600  Stop Time: 1655  Time Calculation (min): 55 min    Therapy Charges for Today     Code Description Service Date Service Provider Modifiers Qty    21189121066 HC PT THER PROC EA 15 MIN 2/22/2018 Pooja Smith, PT GP 2    99902426981 HC PT MANUAL THERAPY EA 15 MIN 2/22/2018 Pooja Smith, PT GP 1    29743448301 HC PT HOT OR COLD PACK TREAT MCARE 2/22/2018 Pooja Smith, PT GP 1                    Pooja Smith PT  2/22/2018

## 2018-02-27 ENCOUNTER — HOSPITAL ENCOUNTER (OUTPATIENT)
Dept: PHYSICAL THERAPY | Facility: HOSPITAL | Age: 73
Setting detail: THERAPIES SERIES
Discharge: HOME | End: 2018-02-27

## 2018-02-27 DIAGNOSIS — Z96.642 HISTORY OF TOTAL LEFT HIP ARTHROPLASTY: ICD-10-CM

## 2018-02-27 DIAGNOSIS — Z78.9 DIFFICULTY WITH ACTIVITIES OF DAILY LIVING: ICD-10-CM

## 2018-02-27 DIAGNOSIS — R26.2 DIFFICULTY WALKING: ICD-10-CM

## 2018-02-27 DIAGNOSIS — M25.552 LEFT HIP PAIN: Primary | ICD-10-CM

## 2018-02-27 PROCEDURE — 97110 THERAPEUTIC EXERCISES: CPT

## 2018-02-27 PROCEDURE — G8979 MOBILITY GOAL STATUS: HCPCS

## 2018-02-27 PROCEDURE — G8980 MOBILITY D/C STATUS: HCPCS

## 2018-02-27 NOTE — THERAPY DISCHARGE NOTE
Outpatient Physical Therapy Ortho Treatment Note/Discharge Summary  [facility]     Patient Name: Gloria Álvarez  : 1945  MRN: [id number]  Today's Date: 2018      Visit Date: 2018    Visit Dx:    ICD-10-CM ICD-9-CM   1. Left hip pain M25.552 719.45   2. History of total left hip arthroplasty Z96.642 V43.64   3. Difficulty walking R26.2 719.7   4. Difficulty with activities of daily living R53.81 799.3       Patient Active Problem List   Diagnosis   • Papillary carcinoma of thyroid   • Atrophic vaginitis   • Chronic interstitial cystitis   • Hyperlipidemia   • Insomnia   • Postmenopausal status   • Postoperative hypothyroidism   • Hx of benign carcinoid tumor   • Hx of diverticulitis of colon   • Age-related osteoporosis without current pathological fracture   • Closed subcapital fracture of left femur   • Hypokalemia        Past Medical History:   Diagnosis Date   • Depression     Patient states she's not had depressionn   • Diverticulosis    • Malignant carcinoid tumor of rectum 2007   • Osteoporosis    • Papillary carcinoma of thyroid    • Personal history of asthma         Past Surgical History:   Procedure Laterality Date   • CATARACT EXTRACTION Bilateral    • CHOLECYSTECTOMY     • COLONOSCOPY  2012    complete   • COLONOSCOPY N/A 2017    Procedure: COLONOSCOPY TO CECUM;  Surgeon: Andres Lambert MD;  Location: [facility] ENDOSCOPY;  Service:    • RECTAL TUMOR BY PROCTOTOMY EXCISION      CARCINOID   • TOTAL HIP ARTHROPLASTY Left 2017    Procedure: TOTAL HIP ARTHROPLASTY ANTERIOR WITH HANA TABLE;  Surgeon: Oral Ford MD;  Location: [facility] MAIN OR;  Service:    • TOTAL THYROIDECTOMY      7/3/12 ESTEFANIA / DR DEE/ 3CM PAP MET  NODULE RIGHT LOBE / LEFT LOBE -                                 Modalities       18 1400          Ice    Ice Applied Yes  -CN      Location L hip  -CN      Rx Minutes 10 mins  -CN      Ice S/P Rx Yes  -CN        User Key  (r) =  Recorded By, (t) = Taken By, (c) = Cosigned By    Initials Name Provider Type    CN Pooja Smith, KEVIN Physical Therapist                Exercises       02/27/18 1400          Subjective Comments    Subjective Comments I feel good, we have been packing the trailer. I am a little tired from that, but feel good.   -CN      Subjective Pain    Able to rate subjective pain? yes  -CN      Pre-Treatment Pain Level 1  -CN      Exercise 1    Exercise Name 1 Piriformis/hip capsule stretch  -CN      Cueing 1 Demo  -CN      Reps 1 3  -CN      Time (Seconds) 1 20  -CN      Exercise 2    Exercise Name 2 Bridges  -CN      Cueing 2 Verbal  -CN      Sets 2 2  -CN      Reps 2 10  -CN      Exercise 3    Exercise Name 3 Prone hip extension  -CN      Cueing 3 Demo  -CN      Sets 3 2  -CN      Reps 3 10  -CN      Exercise 5    Exercise Name 5 Stair hip extension stretch  -CN      Cueing 5 Demo  -CN      Reps 5 10  -CN      Time (Seconds) 5 10  -CN      Exercise 10    Exercise Name 10 LTR blue ball  -CN      Reps 10 20  -CN      Exercise 12    Exercise Name 12 prone glute set  -CN      Reps 12 20  -CN      Time (Seconds) 12 5  -CN      Exercise 13    Exercise Name 13 blue ball roll in  -CN      Reps 13 20  -CN      Exercise 14    Exercise Name 14 SL clamshells  -CN      Cueing 14 Verbal  -CN      Sets 14 2  -CN      Reps 14 10  -CN      Exercise 15    Exercise Name 15 SKTC  -CN      Reps 15 3  -CN      Time (Seconds) 15 20  -CN      Exercise 16    Exercise Name 16 Sidestepping resisted  -CN      Reps 16 3   laps  -CN      Additional Comments RTB  -CN        User Key  (r) = Recorded By, (t) = Taken By, (c) = Cosigned By    Initials Name Provider Type    BRAEDEN Smith, PT Physical Therapist                               PT OP Goals       02/27/18 1400       PT Short Term Goals    STG Date to Achieve 02/13/18  -CN     STG 1 Pt will report subjective pain at 2/10 or less to demonstrate symptom management with ADLs and  recreational activities.   -CN     STG 1 Progress Met  -CN     STG 2 Pt will be independent and compliant with HEP in order to facilitate self-management of symptoms.   -CN     STG 2 Progress Met  -CN     Long Term Goals    LTG Date to Achieve 02/27/18  -CN     LTG 1 Pt will demonstrate increased gross hip strength to 4+/5 on left side in order to decrease UE use for functional mobility.   -CN     LTG 1 Progress Met  -CN     LTG 2 Pt will ambulate without AD community distances with normalized gait pattern in order to facilitate return to recreational activities.   -CN     LTG 2 Progress Partially Met  -CN     LTG 2 Progress Comments Pt carries cane at times and uses if she feels fatigued.  -CN     LTG 3 Pt will improve score on LEFS  to 65% for improved functional mobility.   -CN     LTG 3 Progress Met  -CN     LTG 4 Pt will report 50% reduction in symptoms into L knee and shin.   -CN     LTG 4 Progress Met  -CN       User Key  (r) = Recorded By, (t) = Taken By, (c) = Cosigned By    Initials Name Provider Type    BRAEDEN Smith, KEVIN Physical Therapist          Therapy Education  Given: Symptoms/condition management  Program: Reinforced  How Provided: Verbal, Demonstration  Provided to: Patient  Level of Understanding: Teach back education performed, Verbalized, Demonstrated    Outcome Measure Options: Lower Extremity Functional Scale (LEFS) (Pt scored 78% where 100% is no disability)         Time Calculation:   Start Time: 1400  Stop Time: 1445  Time Calculation (min): 45 min    Therapy Charges for Today     Code Description Service Date Service Provider Modifiers Qty    1943910 HC PT THER PROC EA 15 MIN 2/27/2018 Pooja Smith, PT GP 2    [id number]  PT HOT OR COLD PACK TREAT MCARE 2/27/2018 Pooja Smith, PT GP 1          PT G-Codes  Outcome Measure Options: Lower Extremity Functional Scale (LEFS) (Pt scored 78% where 100% is no disability)     OP PT Discharge Summary  Date of  Discharge: 02/27/18  Reason for Discharge: Maximum functional potential achieved  Outcomes Achieved: Patient able to partially acheive established goals  Discharge Destination: Home with home program  Discharge Instructions: Pt attended 9 skilled therapy sessions for treatment of L hip pain s/p RENITA on 12/27/17. Pt independent with advanced HEP for strength and flexibility and is planning to leave for 2 month vacation on Thursday and will call with any remaining questions or concerns.       Pooja Smith, PT  2/27/2018

## 2018-05-02 ENCOUNTER — TELEPHONE (OUTPATIENT)
Dept: ORTHOPEDIC SURGERY | Facility: CLINIC | Age: 73
End: 2018-05-02

## 2018-05-02 DIAGNOSIS — IMO0002 PROPHYLACTIC ANTIBIOTIC FOR DENTAL PROCEDURE INDICATED DUE TO PRIOR JOINT REPLACEMENT: Primary | ICD-10-CM

## 2018-05-02 RX ORDER — CEPHALEXIN 500 MG/1
CAPSULE ORAL
Qty: 4 CAPSULE | Refills: 2 | Status: SHIPPED | OUTPATIENT
Start: 2018-05-02 | End: 2018-08-28

## 2018-05-02 NOTE — TELEPHONE ENCOUNTER
Prescribed a new prescription to [facility] pharmacy at [phone] for Keflex 500 mg, #4, directions her to take all 4 capsules 1 hour prior to her procedure.  Refill ×2 per RBB

## 2018-05-30 ENCOUNTER — TELEPHONE (OUTPATIENT)
Dept: ORTHOPEDIC SURGERY | Facility: CLINIC | Age: 73
End: 2018-05-30

## 2018-06-05 ENCOUNTER — OFFICE VISIT (OUTPATIENT)
Dept: ORTHOPEDIC SURGERY | Facility: CLINIC | Age: 73
End: 2018-06-05

## 2018-06-05 VITALS — TEMPERATURE: 97.4 F | BODY MASS INDEX: 25.44 KG/M2 | HEIGHT: 64 IN | WEIGHT: 149 LBS

## 2018-06-05 DIAGNOSIS — M53.3 SACRAL PAIN: Primary | ICD-10-CM

## 2018-06-05 PROCEDURE — 99213 OFFICE O/P EST LOW 20 MIN: CPT | Performed by: NURSE PRACTITIONER

## 2018-06-05 PROCEDURE — 72220 X-RAY EXAM SACRUM TAILBONE: CPT | Performed by: NURSE PRACTITIONER

## 2018-06-05 RX ORDER — MELOXICAM 7.5 MG/1
TABLET ORAL
Qty: 30 TABLET | Refills: 3 | Status: SHIPPED | OUTPATIENT
Start: 2018-06-05 | End: 2018-08-28

## 2018-06-05 NOTE — PROGRESS NOTES
Patient: Gloria Álvarez  YOB: 1945 72 y.o. female  Medical Record Number: [id number]    Chief Complaints:   Chief Complaint   Patient presents with   • Pelvis - Establish Care, Pain     sacrum       History of Present Illness:Gloria Álvarez is a 72 y.o. female who presents With complaints of tailbone pain.  Apparently she traveled to [place] and was getting in and out of a truck in which she had a slight across on her tailbone several times and set for extended periods of time.  It was right after then she started with the pain.  She reports the pain is been going on for about 3 weeks it as an intermittent dull ache worse with sitting and driving better with Tylenol ice and a padded pillow.  She denies any fall or specific injury.    Allergies:   Allergies   Allergen Reactions   • Ciprofloxacin Itching     JOINTS ACHY   • Sulfa Antibiotics Itching   • Varicella Virus Vaccine Live Other (See Comments)     FLU LIKE SYMPTOMS, RED AT SITE   • Zoster Vaccine Live Other (See Comments)     FLU LIKE SYMPTOMS, RED AT SITE       Medications:   Current Outpatient Prescriptions   Medication Sig Dispense Refill   • aspirin 81 MG tablet      • Calcium Carbonate-Vit D-Min (CALTRATE 600+D PLUS MINERALS) 600-800 MG-UNIT chewable tablet Chew 1 tablet Daily.     • cephalexin (KEFLEX) 500 MG capsule Take all 4 caps 1 hour prior to procedure 4 capsule 2   • Cholecalciferol (VITAMIN D3) 1000 units capsule Take  by mouth.     • levothyroxine (SYNTHROID, LEVOTHROID) 150 MCG tablet Take 150 mcg by mouth Daily.     • Lutein 6 MG capsule Take 6 mg by mouth Daily. Take as directed.     • vitamin E 400 UNIT capsule Take 400 Units by mouth Daily.     • meloxicam (MOBIC) 7.5 MG tablet 1 Oral Daily with food. 30 tablet 3     No current facility-administered medications for this visit.          The following portions of the patient's history were reviewed and updated as appropriate: allergies, current medications, past  "family history, past medical history, past social history, past surgical history and problem list.    Review of Systems:   A 14 point review of systems was performed. All systems negative except pertinent positives/negative listed in HPI above    Physical Exam:   Vitals:    06/05/18 1048   Temp: 97.4 °F (36.3 °C)   Weight: 67.6 kg (149 lb)   Height: 162.6 cm (64\")       General: A and O x 3, ASA, NAD    SCLERA:    Normal    DENTITION:   Normal  Skin clear no unusual lesions noted  Bilateral hips patient is excellent range of motion with no instability she is very tender over the distal coccyx with no lesions noted.    Radiology:  Xrays 3 views of the sacrum were ordered and reviewed today and show no fractures or tumors.  No comparative views available     Assessment/Plan:  Coccydynia    We sent a prescription to patient's pharmacy for meloxicam for her to take on a regular basis for the next couple weeks and then when necessary.  If her symptoms do not continue to improve we might try a Medrol Dosepak and or physical therapy and ultimately an MRI of the area to further evaluate if her pain does not completely resolve  "

## 2018-06-19 ENCOUNTER — TELEPHONE (OUTPATIENT)
Dept: ORTHOPEDIC SURGERY | Facility: CLINIC | Age: 73
End: 2018-06-19

## 2018-06-19 DIAGNOSIS — Z96.642 STATUS POST LEFT HIP REPLACEMENT: Primary | ICD-10-CM

## 2018-06-19 NOTE — TELEPHONE ENCOUNTER
PT order has been entered and faxed over to [facility] @ [facility] [phone]

## 2018-11-07 ENCOUNTER — APPOINTMENT (OUTPATIENT)
Dept: WOMENS IMAGING | Facility: HOSPITAL | Age: 73
End: 2018-11-07

## 2018-11-07 PROCEDURE — 77063 BREAST TOMOSYNTHESIS BI: CPT | Performed by: RADIOLOGY

## 2018-11-07 PROCEDURE — 77067 SCR MAMMO BI INCL CAD: CPT | Performed by: RADIOLOGY

## 2019-02-21 ENCOUNTER — OFFICE VISIT (OUTPATIENT)
Dept: ORTHOPEDIC SURGERY | Facility: CLINIC | Age: 74
End: 2019-02-21

## 2019-02-21 VITALS — BODY MASS INDEX: 25.95 KG/M2 | WEIGHT: 152 LBS | TEMPERATURE: 97.6 F | HEIGHT: 64 IN

## 2019-02-21 DIAGNOSIS — M25.551 HIP PAIN, ACUTE, RIGHT: ICD-10-CM

## 2019-02-21 DIAGNOSIS — Z96.642 H/O TOTAL HIP ARTHROPLASTY, LEFT: Primary | ICD-10-CM

## 2019-02-21 PROCEDURE — 99213 OFFICE O/P EST LOW 20 MIN: CPT | Performed by: ORTHOPAEDIC SURGERY

## 2019-02-21 PROCEDURE — 73521 X-RAY EXAM HIPS BI 2 VIEWS: CPT | Performed by: ORTHOPAEDIC SURGERY

## 2019-02-21 RX ORDER — CEPHALEXIN 500 MG/1
2000 CAPSULE ORAL ONCE
Qty: 4 CAPSULE | Refills: 5 | Status: SHIPPED | OUTPATIENT
Start: 2019-02-21 | End: 2019-02-21

## 2019-02-21 NOTE — PROGRESS NOTES
"Gloria Álvarez : 1945 MRN: [id number] DATE: 2019    CHIEF COMPLAINT:  Follow up left total hip  HAS SOME MILD LATERL RIGHT HIP SORENESS - ACHNING    SUBJECTIVE:Patient returns today for  1 YEAR  follow up of left total hip replacement. Patient reports doing well with no unusual complaints. Denies any limitations due to the hip.    OBJECTIVE:    Temp 97.6 °F (36.4 °C) (Temporal)   Ht 162.6 cm (64\")   Wt 68.9 kg (152 lb)   BMI 26.09 kg/m²   Family History   Problem Relation Age of Onset   • Hypertension Mother    • Hypertension Brother    • Malig Hyperthermia Neg Hx      Past Medical History:   Diagnosis Date   • Depression     Patient states she's not had depressionn   • Diverticulosis    • Malignant carcinoid tumor of rectum (CMS/HCC) 2007   • Osteoporosis    • Papillary carcinoma of thyroid (CMS/HCC)    • Personal history of asthma      Past Surgical History:   Procedure Laterality Date   • CATARACT EXTRACTION Bilateral    • CHOLECYSTECTOMY     • COLONOSCOPY  2012    complete   • COLONOSCOPY N/A 2017    Procedure: COLONOSCOPY TO CECUM;  Surgeon: Andres Lambert MD;  Location: [facility] ENDOSCOPY;  Service:    • RECTAL TUMOR BY PROCTOTOMY EXCISION      CARCINOID   • REPLACEMENT TOTAL HIP SUPINE POSITION     • TOTAL HIP ARTHROPLASTY Left 2017    Procedure: TOTAL HIP ARTHROPLASTY ANTERIOR WITH HANA TABLE;  Surgeon: Oral Ford MD;  Location: [facility] OR;  Service:    • TOTAL THYROIDECTOMY      7/3/12 ESTEFANIA / DR DEE/ 3CM PAP MET  NODULE RIGHT LOBE / LEFT LOBE -     Social History     Socioeconomic History   • Marital status:      Spouse name: Not on file   • Number of children: Not on file   • Years of education: Not on file   • Highest education level: Not on file   Social Needs   • Financial resource strain: Not on file   • Food insecurity - worry: Not on file   • Food insecurity - inability: Not on file   • Transportation needs - medical: Not on file   • " Transportation needs - non-medical: Not on file   Occupational History   • Not on file   Tobacco Use   • Smoking status: Never Smoker   • Smokeless tobacco: Never Used   Substance and Sexual Activity   • Alcohol use: No   • Drug use: No   • Sexual activity: Defer   Other Topics Concern   • Not on file   Social History Narrative   • Not on file       Review of Systems: 14 point review of systems performed pertinent positives and negatives discussed above, all other systems are negative    Exam:. The incision is well healed. Range of motion is good without irritability. The calf is soft and nontender with a negative Homans sign. Alignment is neutral. Leg lengths are equal. Good hip flexion and abduction strength.Walks with nonantalgic gait. Intact to light touch with palpable distal pulses.     DIAGNOSTIC STUDIES  Xrays:Xrays 2 view both hips AP and lateral ordered: ordered and reviewed demonstrate a well positioned RENITA without complicating factors. The right hip looks fine    ASSESSMENT:   Follow up left hip replacement. doing well, stretching exercises for right hip tendontitis       PLAN:    Continue activities as tolerated  Follow up dillan Ford MD  2/21/2019

## 2019-03-15 ENCOUNTER — OFFICE VISIT (OUTPATIENT)
Dept: FAMILY MEDICINE CLINIC | Facility: CLINIC | Age: 74
End: 2019-03-15

## 2019-03-15 VITALS
HEART RATE: 84 BPM | WEIGHT: 158 LBS | DIASTOLIC BLOOD PRESSURE: 68 MMHG | HEIGHT: 65 IN | SYSTOLIC BLOOD PRESSURE: 132 MMHG | OXYGEN SATURATION: 99 % | BODY MASS INDEX: 26.33 KG/M2 | RESPIRATION RATE: 16 BRPM

## 2019-03-15 DIAGNOSIS — M54.31 SCIATICA OF RIGHT SIDE: ICD-10-CM

## 2019-03-15 DIAGNOSIS — C73 PAPILLARY CARCINOMA OF THYROID (HCC): Primary | ICD-10-CM

## 2019-03-15 DIAGNOSIS — M81.0 AGE-RELATED OSTEOPOROSIS WITHOUT CURRENT PATHOLOGICAL FRACTURE: ICD-10-CM

## 2019-03-15 PROCEDURE — 99214 OFFICE O/P EST MOD 30 MIN: CPT | Performed by: FAMILY MEDICINE

## 2019-03-15 NOTE — PATIENT INSTRUCTIONS
Check in with the chiropractor and see if that helps with her back and possibly her abdomen. Also add some fiber to diet.

## 2019-03-15 NOTE — PROGRESS NOTES
Problem List Items Addressed This Visit        Endocrine    Papillary carcinoma of thyroid (CMS/[facility]) - Primary    Overview     Chris 3/15/2019  Sees Dr. Josie Jeff            Musculoskeletal and Integument    Age-related osteoporosis without current pathological fracture    Overview     OVERVIEW:  Noted 11/10/2017          Current Assessment & Plan     [place] 3/15/2019  She is going to talk to Dr. Rodriguez about her DEXA           Other Visit Diagnoses     Sciatica of right side             Return for yearly Medicare Exam.  Patient Instructions   Check in with the chiropractor and see if that helps with her back and possibly her abdomen. Also add some fiber to diet.     Gloria Álvarez is a 73 y.o. female being seen in our office today for Constipation and Sciatica (possible)                 She  reports that  has never smoked. she has never used smokeless tobacco. She reports that she does not drink alcohol or use drugs.             HPI She fx'd her hip in November of 2017 at exercise class. She is still exercising despite that. Having some abdominal bloating with cramping. Had xray of the hip which was negative on the right. She gets pain on the right pain and it will sometimes shoots down her right leg. Maybe walking on the treadmill and walking helps. Deep bends on the right side will make it worse. She has not been back to the chiropractor.    She has had a couple of recent UTIs. Occasional she is having hard stools. Thyroid is now good. She is drinking lots of water. She is doing well with fiber and spinach though they do eat out a lot.                  The following portions of the patient's history were reviewed and updated as appropriate:PMHroutine: Social history , Allergies, Current Medications, Active Problem List and Health Maintenance            Review of Systems   Constitutional: Negative for activity change, appetite change, chills, fatigue, fever and unexpected weight change.   HENT:  Negative for congestion, ear pain, hearing loss, nosebleeds, rhinorrhea and sore throat.    Eyes: Negative for pain, redness and visual disturbance.   Respiratory: Negative for cough, shortness of breath and wheezing.    Cardiovascular: Negative for chest pain, palpitations and leg swelling.   Gastrointestinal: Positive for abdominal distention and constipation. Negative for abdominal pain, blood in stool, diarrhea, nausea and vomiting.   Endocrine: Negative for cold intolerance and heat intolerance.   Genitourinary: Negative for difficulty urinating, dysuria, frequency, hematuria, pelvic pain, urgency and vaginal discharge.   Musculoskeletal: Positive for myalgias. Negative for arthralgias, back pain and joint swelling.   Skin: Negative for rash and wound.   Neurological: Negative for dizziness, weakness, numbness and headaches.   Hematological: Does not bruise/bleed easily.   Psychiatric/Behavioral: Negative for dysphoric mood, sleep disturbance and suicidal ideas. The patient is not nervous/anxious.                 BP Readings from Last 1 Encounters:   03/15/19 132/68     Wt Readings from Last 3 Encounters:   03/15/19 71.7 kg (158 lb)   03/04/19 68.9 kg (152 lb)   02/21/19 68.9 kg (152 lb)   Body mass index is 26.7 kg/m².             Physical Exam   Constitutional: She appears well-developed and well-nourished. No distress.   Abdominal: Soft. Bowel sounds are normal. She exhibits no distension and no mass. There is no tenderness. There is no rebound and no guarding.   Psychiatric: She has a normal mood and affect. Her behavior is normal. Judgment and thought content normal.   Vitals reviewed.        Admission on 03/04/2019, Discharged on 03/04/2019   Component Date Value Ref Range Status   • Color 03/04/2019 Yellow  Yellow, Straw, Dark Yellow, Irish Final   • Clarity, UA 03/04/2019 Clear  Clear Final   • Glucose, UA 03/04/2019 Negative  Negative, 1000 mg/dL (3+) mg/dL Final   • Bilirubin 03/04/2019 Negative   Negative Final   • Ketones, UA 03/04/2019 Negative  Negative Final   • Specific Gravity  03/04/2019 1.010  1.005 - 1.030 Final   • Blood, UA 03/04/2019 Trace* Negative Final   • pH, Urine 03/04/2019 5.5  5.0 - 8.0 Final   • Protein, POC 03/04/2019 Negative  Negative mg/dL Final   • Urobilinogen, UA 03/04/2019 Normal  Normal Final   • Nitrite, UA 03/04/2019 Negative  Negative Final   • Leukocytes 03/04/2019 Small (1+)* Negative Final   • Urine Culture 03/04/2019 Final report   Final   • Result 1 03/04/2019 Comment   Final    Mixed urogenital andrea  Less than 10,000 colonies/mL   Admission on 02/19/2019, Discharged on 02/19/2019   Component Date Value Ref Range Status   • Color 02/19/2019 Yellow  Yellow, Straw, Dark Yellow, Irish Final   • Clarity, UA 02/19/2019 Clear  Clear Final   • Glucose, UA 02/19/2019 Negative  Negative, 1000 mg/dL (3+) mg/dL Final   • Bilirubin 02/19/2019 Negative  Negative Final   • Ketones, UA 02/19/2019 Negative  Negative Final   • Specific Gravity  02/19/2019 1.010  1.005 - 1.030 Final   • Blood, UA 02/19/2019 Trace* Negative Final   • pH, Urine 02/19/2019 5.5  5.0 - 8.0 Final   • Protein, POC 02/19/2019 Negative  Negative mg/dL Final   • Urobilinogen, UA 02/19/2019 Normal  Normal Final   • Nitrite, UA 02/19/2019 Negative  Negative Final   • Leukocytes 02/19/2019 Small (1+)* Negative Final

## 2019-03-28 ENCOUNTER — OFFICE VISIT (OUTPATIENT)
Dept: FAMILY MEDICINE CLINIC | Facility: CLINIC | Age: 74
End: 2019-03-28

## 2019-03-28 VITALS
HEIGHT: 65 IN | HEART RATE: 84 BPM | DIASTOLIC BLOOD PRESSURE: 68 MMHG | RESPIRATION RATE: 16 BRPM | BODY MASS INDEX: 26.33 KG/M2 | WEIGHT: 158 LBS | SYSTOLIC BLOOD PRESSURE: 126 MMHG | OXYGEN SATURATION: 99 %

## 2019-03-28 DIAGNOSIS — M81.0 AGE-RELATED OSTEOPOROSIS WITHOUT CURRENT PATHOLOGICAL FRACTURE: Primary | ICD-10-CM

## 2019-03-28 PROCEDURE — 96160 PT-FOCUSED HLTH RISK ASSMT: CPT | Performed by: FAMILY MEDICINE

## 2019-03-28 PROCEDURE — G0439 PPPS, SUBSEQ VISIT: HCPCS | Performed by: FAMILY MEDICINE

## 2019-03-28 RX ORDER — IBANDRONATE SODIUM 150 MG/1
150 TABLET, FILM COATED ORAL
Qty: 3 TABLET | Refills: 3 | Status: SHIPPED | OUTPATIENT
Start: 2019-03-28 | End: 2019-05-28 | Stop reason: SDUPTHER

## 2019-03-28 NOTE — PROGRESS NOTES
QUICK REFERENCE INFORMATION:  The ABCs of the Annual Wellness Visit    Subsequent Medicare Wellness Visit    HEALTH RISK ASSESSMENT    1945    Recent Hospitalizations:  No hospitalization(s) within the last year..    Current Medical Providers:  Patient Care Team:  Jess Holden MD as PCP - General (Family Medicine)    Smoking Status:  Social History     Tobacco Use   Smoking Status Never Smoker   Smokeless Tobacco Never Used       Alcohol Consumption:  Social History     Substance and Sexual Activity   Alcohol Use No       Depression Screen:   PHQ-2/PHQ-9 Depression Screening 3/28/2019   Little interest or pleasure in doing things 0   Feeling down, depressed, or hopeless 0   Total Score 0       Health Habits and Functional and Cognitive Screening:  Functional & Cognitive Status 3/28/2019   Do you have difficulty preparing food and eating? No   Do you have difficulty bathing yourself, getting dressed or grooming yourself? No   Do you have difficulty using the toilet? No   Do you have difficulty moving around from place to place? No   Do you have trouble with steps or getting out of a bed or a chair? No   In the past year have you fallen or experienced a near fall? No   Current Diet Well Balanced Diet   Dental Exam Up to date   Eye Exam Up to date   Exercise (times per week) 4 times per week   Current Exercise Activities Include Cardiovasular Workout on Exercise Equipment   Do you need help using the phone?  No   Are you deaf or do you have serious difficulty hearing?  No   Do you need help with transportation? No   Do you need help shopping? No   Do you need help preparing meals?  No   Do you need help with housework?  No   Do you need help with laundry? No   Do you need help taking your medications? No   Do you need help managing money? No   Do you ever drive or ride in a car without wearing a seat belt? No   Have you felt unusual stress, anger or loneliness in the last month? No   Who do you live with?  Spouse   If you need help, do you have trouble finding someone available to you? No   Have you been bothered in the last four weeks by sexual problems? No   Do you have difficulty concentrating, remembering or making decisions? No     Activities of Daily Living  Do you have difficulty preparing food and eating?: No  Do you have difficulty bathing yourself, getting dressed or grooming yourself?: No  Do you have difficulty using the toilet?: No  Do you have difficulty moving around from place to place?: No  Do you have trouble with steps or getting out of a bed or a chair?: No  In the past year have you fallen or experienced a near fall?: No  Instrumental Activities of Daily Living  Do you need help using the phone? : No  Are you deaf or do you have serious difficulty hearing? : No  Do you need help with transportation?: No  Do you need help shopping?: No  Do you need help preparing meals? : No  Do you need help with housework? : No  Do you need help with laundry?: No  Do you need help taking your medications?: No  Do you need help managing money?: No  Do you ever drive or ride in a car without wearing a seat belt?: No  Psychosocial Risks  Have you felt unusual stress, anger or loneliness in the last month?: No  Who do you live with?: Spouse  If you need help, do you have trouble finding someone available to you?: No  Have you been bothered in the last four weeks by sexual problems?: No  Cognitive Status  Do you have difficulty concentrating, remembering or making decisions?: No  Health Habits  Current Diet: Well Balanced Diet  Dental Exam: Up to date  Eye Exam: Up to date  Exercise (times per week): 4 times per week  Current Exercise Activities Include: Cardiovasular Workout on Exercise Equipment    Does the patient have evidence of cognitive impairment? Yes  [id number]    Aspirin use counseling: Does not need ASA but is currently taking (advised patient that ASA is not indicated and patient chooses to stop  it)    Recent Lab Results:  CMP:  Lab Results   Component Value Date    GLU 96 01/19/2015    BUN 12 12/28/2017    CREATININE 0.78 12/28/2017    EGFRIFNONA 72 12/28/2017    EGFRIFAFRI >60 01/19/2015    BCR 15.4 12/28/2017     12/28/2017    K 3.8 12/28/2017    CO2 24.6 12/28/2017    CALCIUM 8.4 (L) 12/28/2017    PROTENTOTREF 7.2 01/19/2015    ALBUMIN 3.30 (L) 12/27/2017    LABGLOBREF 3.0 01/19/2015    LABIL2 1.4 01/19/2015    BILITOT 0.7 12/27/2017    ALKPHOS 75 12/27/2017    AST 25 12/27/2017    ALT 19 12/27/2017     Lipid Panel:  Lab Results   Component Value Date    TRIG 111 01/19/2015    HDL 58 01/19/2015    VLDL 22 01/19/2015     HbA1c:       Visual Acuity:  No exam data present    Age-appropriate Screening Schedule:  Refer to the list below for future screening recommendations based on patient's age, sex and/or medical conditions. Orders for these recommended tests are listed in the plan section. The patient has been provided with a written plan.    Health Maintenance   Topic Date Due   • PNEUMOCOCCAL VACCINES (65+ LOW/MEDIUM RISK) (2 of 2 - PPSV23) 07/15/2019 (Originally 4/9/2016)   • ZOSTER VACCINE (2 of 3) 03/23/2020 (Originally 2/26/2010)   • LIPID PANEL  07/03/2019   • DXA SCAN  11/03/2019   • MAMMOGRAM  11/16/2020   • COLONOSCOPY  07/05/2022   • TDAP/TD VACCINES (2 - Td) 09/07/2028   • INFLUENZA VACCINE  Completed        Subjective   History of Present Illness    Gloria Álvarez is a 73 y.o. female who presents for a Subsequent Wellness Visit.  HPI    The following portions of the patient's history were reviewed and updated as appropriate: allergies, current medications, past family history, past medical history, past social history, past surgical history and problem list.    Outpatient Medications Prior to Visit   Medication Sig Dispense Refill   • aspirin 81 MG tablet      • Calcium Carbonate-Vit D-Min (CALTRATE 600+D PLUS MINERALS) 600-800 MG-UNIT chewable tablet Chew 1 tablet Daily.     •  Cholecalciferol (VITAMIN D3) 1000 units capsule Take  by mouth.     • levothyroxine (SYNTHROID, LEVOTHROID) 150 MCG tablet Take 150 mcg by mouth Daily.     • Lutein 6 MG capsule Take 6 mg by mouth Daily. Take as directed.     • vitamin E 400 UNIT capsule Take 400 Units by mouth Daily.       No facility-administered medications prior to visit.        Patient Active Problem List   Diagnosis   • Papillary carcinoma of thyroid (CMS/HCC)   • Atrophic vaginitis   • Chronic interstitial cystitis   • Hyperlipidemia   • Insomnia   • Postoperative hypothyroidism   • Hx of benign carcinoid tumor   • Hx of diverticulitis of colon   • Age-related osteoporosis without current pathological fracture   • Closed subcapital fracture of left femur (CMS/HCC)   • Hypokalemia       Advance Care Planning:  Patient has an advance directive - a copy has been provided and is visible in patient header    Identification of Risk Factors:  Risk factors include: Hyperlipidemia, osteoporosis    Review of Systems   Constitutional: Negative for activity change, appetite change, chills, fatigue, fever and unexpected weight change.   HENT: Negative for congestion, ear pain, hearing loss, nosebleeds, rhinorrhea and sore throat.    Eyes: Negative for pain, redness and visual disturbance.   Respiratory: Negative for cough, shortness of breath and wheezing.    Cardiovascular: Negative for chest pain, palpitations and leg swelling.   Gastrointestinal: Positive for constipation. Negative for abdominal pain, blood in stool, diarrhea, nausea and vomiting.   Endocrine: Negative for cold intolerance and heat intolerance.   Genitourinary: Negative for difficulty urinating, dysuria, frequency, hematuria, pelvic pain, urgency and vaginal discharge.   Musculoskeletal: Positive for myalgias. Negative for arthralgias, back pain and joint swelling.   Skin: Negative for rash and wound.   Neurological: Negative for dizziness, weakness, numbness and headaches.  "  Hematological: Does not bruise/bleed easily.   Psychiatric/Behavioral: Negative for dysphoric mood, sleep disturbance and suicidal ideas. The patient is not nervous/anxious.        Compared to one year ago, the patient feels her physical health is better and her mental health is the same.    Objective     Physical Exam    Vitals:    03/28/19 1031   BP: 126/68   Pulse: 84   Resp: 16   SpO2: 99%   Weight: 71.7 kg (158 lb)   Height: 163.8 cm (64.5\")       Patient's Body mass index is 26.7 kg/m². BMI is above normal parameters. Recommendations include: She is going to the gym and eating healthy. SHe is barely overweight. .        Assessment/Plan   Patient Self-Management and Personalized Health Advice  The patient has been provided with information about: Osteoprorosis risk identified;  Use Calcium and Vitamin D as directed, avoid caffeine, participate in weight bearing exercises for better bone health and Beginning meds today  Immunizations Discussed/Encouraged (specific immunizations; Shingrix ) and preventive services including:     · Bone Density Measurements  · Shingrix.    Visit Diagnoses:  Problem List Items Addressed This Visit        Musculoskeletal and Integument    Age-related osteoporosis without current pathological fracture - Primary    Overview     OVERVIEW:  Noted 11/10/2017          Relevant Medications    ibandronate (BONIVA) 150 MG tablet          No orders of the defined types were placed in this encounter.      Outpatient Encounter Medications as of 3/28/2019   Medication Sig Dispense Refill   • aspirin 81 MG tablet      • Calcium Carbonate-Vit D-Min (CALTRATE 600+D PLUS MINERALS) 600-800 MG-UNIT chewable tablet Chew 1 tablet Daily.     • Cholecalciferol (VITAMIN D3) 1000 units capsule Take  by mouth.     • levothyroxine (SYNTHROID, LEVOTHROID) 150 MCG tablet Take 150 mcg by mouth Daily.     • Lutein 6 MG capsule Take 6 mg by mouth Daily. Take as directed.     • vitamin E 400 UNIT capsule Take 400 " Units by mouth Daily.     • ibandronate (BONIVA) 150 MG tablet Take 1 tablet by mouth Every 30 (Thirty) Days. 3 tablet 3     No facility-administered encounter medications on file as of 3/28/2019.        Reviewed use of high risk medication in the elderly: yes  Reviewed for potential of harmful drug interactions in the elderly: yes    Follow Up:  Return in about 1 year (around 3/28/2020) for yearly Medicare Exam.     An After Visit Summary and PPPS with all of these plans were given to the patient.

## 2019-05-28 DIAGNOSIS — M81.0 AGE-RELATED OSTEOPOROSIS WITHOUT CURRENT PATHOLOGICAL FRACTURE: ICD-10-CM

## 2019-05-28 RX ORDER — IBANDRONATE SODIUM 150 MG/1
150 TABLET, FILM COATED ORAL
Qty: 3 TABLET | Refills: 3 | OUTPATIENT
Start: 2019-05-28 | End: 2020-01-29

## 2019-06-10 ENCOUNTER — OFFICE VISIT (OUTPATIENT)
Dept: ORTHOPEDIC SURGERY | Facility: CLINIC | Age: 74
End: 2019-06-10

## 2019-06-10 VITALS — BODY MASS INDEX: 25.99 KG/M2 | TEMPERATURE: 97.7 F | HEIGHT: 65 IN | WEIGHT: 156 LBS

## 2019-06-10 DIAGNOSIS — M25.562 PAIN IN BOTH KNEES, UNSPECIFIED CHRONICITY: ICD-10-CM

## 2019-06-10 DIAGNOSIS — M25.561 PAIN IN BOTH KNEES, UNSPECIFIED CHRONICITY: ICD-10-CM

## 2019-06-10 DIAGNOSIS — M70.62 GREATER TROCHANTERIC BURSITIS, LEFT: Primary | ICD-10-CM

## 2019-06-10 PROCEDURE — 20610 DRAIN/INJ JOINT/BURSA W/O US: CPT | Performed by: NURSE PRACTITIONER

## 2019-06-10 PROCEDURE — 99213 OFFICE O/P EST LOW 20 MIN: CPT | Performed by: NURSE PRACTITIONER

## 2019-06-10 RX ORDER — METHYLPREDNISOLONE ACETATE 80 MG/ML
80 INJECTION, SUSPENSION INTRA-ARTICULAR; INTRALESIONAL; INTRAMUSCULAR; SOFT TISSUE
Status: COMPLETED | OUTPATIENT
Start: 2019-06-10 | End: 2019-06-10

## 2019-06-10 RX ADMIN — METHYLPREDNISOLONE ACETATE 80 MG: 80 INJECTION, SUSPENSION INTRA-ARTICULAR; INTRALESIONAL; INTRAMUSCULAR; SOFT TISSUE at 10:55

## 2019-06-10 NOTE — PROGRESS NOTES
Patient: Gloria Álvarez  YOB: 1945 73 y.o. female  Medical Record Number: [id number]    Chief Complaints:   Chief Complaint   Patient presents with   • Left Hip - Follow-up, Pain       History of Present Illness:Gloria Álvarez is a 73 y.o. female who presents for follow-up of left lateral hip pain.  Patient had previous left total hip replacement she has had left lateral hip pain off and on for the last few months.  When she saw Dr. Ford he recommended that she continue with the stretching but the soreness has persisted.    Allergies:   Allergies   Allergen Reactions   • Ciprofloxacin Itching     JOINTS ACHY   • Sulfa Antibiotics Itching   • Varicella Virus Vaccine Live Other (See Comments)     FLU LIKE SYMPTOMS, RED AT SITE   • Zoster Vaccine Live Other (See Comments)     FLU LIKE SYMPTOMS, RED AT SITE       Medications:   Current Outpatient Medications   Medication Sig Dispense Refill   • aspirin 81 MG tablet      • Calcium Carbonate-Vit D-Min (CALTRATE 600+D PLUS MINERALS) 600-800 MG-UNIT chewable tablet Chew 1 tablet Daily.     • Cholecalciferol (VITAMIN D3) 1000 units capsule Take  by mouth.     • levothyroxine (SYNTHROID, LEVOTHROID) 150 MCG tablet Take 150 mcg by mouth Daily.     • Lutein 6 MG capsule Take 6 mg by mouth Daily. Take as directed.     • vitamin E 400 UNIT capsule Take 400 Units by mouth Daily.     • ibandronate (BONIVA) 150 MG tablet Take 1 tablet by mouth Every 30 (Thirty) Days. 3 tablet 3     No current facility-administered medications for this visit.          The following portions of the patient's history were reviewed and updated as appropriate: allergies, current medications, past family history, past medical history, past social history, past surgical history and problem list.    Review of Systems:   A 14 point review of systems was performed. All systems negative except pertinent positives/negative listed in HPI above    Physical Exam:   Vitals:    06/10/19 1015  "  Temp: 97.7 °F (36.5 °C)   TempSrc: Temporal   Weight: 70.8 kg (156 lb)   Height: 165.1 cm (65\")       General: A and O x 3, ASA, NAD    SCLERA:    Normal    DENTITION:   Normal  Skin clear no unusual lesions noted  Left hip patient is tender over left hip greater trochanteric bursa otherwise has good range of motion with no instability calf is soft nontender    Radiology:  Xrays previous x-rays left hip were reviewed show well-placed well-positioned left total hip replacement    Assessment/Plan:  Status post left total hip replacement with greater trochanteric bursitis    Patient I discussed treatment options.  She would like to proceed with left hip bursa injection.  Prior to injection risks were discussed including pain, infection, elevated blood sugar.  Patient verbalized understanding would like to proceed with injection.  She will continue with stretching exercises and we will see her back as needed    Large Joint Arthrocentesis: L greater trochanteric bursa  Date/Time: 6/10/2019 10:55 AM  Consent given by: patient  Site marked: site marked  Timeout: Immediately prior to procedure a time out was called to verify the correct patient, procedure, equipment, support staff and site/side marked as required   Supporting Documentation  Indications: pain   Procedure Details  Location: hip - L greater trochanteric bursa  Preparation: Patient was prepped and draped in the usual sterile fashion  Needle size: 22 G  Approach: lateral  Medications administered: 80 mg methylPREDNISolone acetate 80 MG/ML; 2 mL lidocaine (cardiac)  Patient tolerance: patient tolerated the procedure well with no immediate complications        "

## 2019-06-17 ENCOUNTER — TELEPHONE (OUTPATIENT)
Dept: FAMILY MEDICINE CLINIC | Facility: CLINIC | Age: 74
End: 2019-06-17

## 2019-06-17 NOTE — TELEPHONE ENCOUNTER
Boniva sometimes causes joint aches but not usually bursitis which is an inflammation of the joint fluid. I think it's unlikely the boniva caused it.

## 2019-06-17 NOTE — TELEPHONE ENCOUNTER
Patient called stated she started Boniva a couple months ago and had no issue's until she took her June dose.  After taking it she has had burning in the left hip and under the buttocks with pain in too in left hip she stated she went to [facility] and they DX her with bursitis in the left side and she did get a cortisone shot and is in PT now.  She is asking if this was all caused from the Boniva as  She has never had any issue's prior and is unsure that she wants to continue the medication

## 2019-06-17 NOTE — TELEPHONE ENCOUNTER
Patient informed and stated she was not going to continue taking Boniva she was going to stick with Calcium and and Vitamin D

## 2019-09-25 DIAGNOSIS — Z78.0 POST-MENOPAUSAL: Primary | ICD-10-CM

## 2019-11-11 ENCOUNTER — APPOINTMENT (OUTPATIENT)
Dept: WOMENS IMAGING | Facility: HOSPITAL | Age: 74
End: 2019-11-11

## 2019-11-11 PROCEDURE — 77080 DXA BONE DENSITY AXIAL: CPT | Performed by: RADIOLOGY

## 2019-11-11 PROCEDURE — 77067 SCR MAMMO BI INCL CAD: CPT | Performed by: RADIOLOGY

## 2019-11-11 PROCEDURE — 77063 BREAST TOMOSYNTHESIS BI: CPT | Performed by: RADIOLOGY

## 2019-12-05 DIAGNOSIS — Z78.0 POST-MENOPAUSAL: ICD-10-CM

## 2020-03-06 ENCOUNTER — OFFICE VISIT (OUTPATIENT)
Dept: ORTHOPEDIC SURGERY | Facility: CLINIC | Age: 75
End: 2020-03-06

## 2020-03-06 VITALS — BODY MASS INDEX: 26.49 KG/M2 | HEIGHT: 65 IN | TEMPERATURE: 98 F | WEIGHT: 159 LBS

## 2020-03-06 DIAGNOSIS — M70.62 TROCHANTERIC BURSITIS OF LEFT HIP: Primary | ICD-10-CM

## 2020-03-06 PROCEDURE — 99213 OFFICE O/P EST LOW 20 MIN: CPT | Performed by: NURSE PRACTITIONER

## 2020-03-09 PROCEDURE — 20610 DRAIN/INJ JOINT/BURSA W/O US: CPT | Performed by: NURSE PRACTITIONER

## 2020-03-09 RX ORDER — METHYLPREDNISOLONE ACETATE 80 MG/ML
80 INJECTION, SUSPENSION INTRA-ARTICULAR; INTRALESIONAL; INTRAMUSCULAR; SOFT TISSUE
Status: COMPLETED | OUTPATIENT
Start: 2020-03-09 | End: 2020-03-09

## 2020-03-09 RX ADMIN — METHYLPREDNISOLONE ACETATE 80 MG: 80 INJECTION, SUSPENSION INTRA-ARTICULAR; INTRALESIONAL; INTRAMUSCULAR; SOFT TISSUE at 10:23

## 2020-03-09 NOTE — PROGRESS NOTES
Patient: Gloria Álvarez  YOB: 1945 74 y.o. female  Medical Record Number: [id number]    Chief Complaints:   Chief Complaint   Patient presents with   • Left Hip - Follow-up       History of Present Illness:Gloria Álvarez is a 74 y.o. female who presents with complaints of left lateral hip pain.  The patient reports it started several weeks ago.  Denies any injury.  She does have a history of bursitis.  Describes the hip pain as a moderate constant ache worse at night when she lies on her side.  Only slightly better with rest.    Allergies:   Allergies   Allergen Reactions   • Ciprofloxacin Itching     JOINTS ACHY   • Sulfa Antibiotics Itching   • Varicella Virus Vaccine Live Other (See Comments)     FLU LIKE SYMPTOMS, RED AT SITE   • Zoster Vaccine Live Other (See Comments)     FLU LIKE SYMPTOMS, RED AT SITE       Medications:   Current Outpatient Medications   Medication Sig Dispense Refill   • aspirin 81 MG tablet      • Calcium Carbonate-Vit D-Min (CALTRATE 600+D PLUS MINERALS) 600-800 MG-UNIT chewable tablet Chew 1 tablet Daily.     • Cholecalciferol (VITAMIN D3) 1000 units capsule Take  by mouth.     • levothyroxine (SYNTHROID, LEVOTHROID) 150 MCG tablet Take 150 mcg by mouth Daily.     • Lutein 6 MG capsule Take 6 mg by mouth Daily. Take as directed.     • nitrofurantoin, macrocrystal-monohydrate, (MACROBID) 100 MG capsule Take 1 capsule by mouth 2 (Two) Times a Day. 14 capsule 0   • phenazopyridine (PYRIDIUM) 200 MG tablet Take 1 tablet by mouth 3 (Three) Times a Day As Needed for Bladder Spasms. 9 tablet 0   • vitamin E 400 UNIT capsule Take 400 Units by mouth Daily.       No current facility-administered medications for this visit.          The following portions of the patient's history were reviewed and updated as appropriate: allergies, current medications, past family history, past medical history, past social history, past surgical history and problem list.    Review of Systems:  "  A 14 point review of systems was performed. All systems negative except pertinent positives/negative listed in HPI above    Physical Exam:   Vitals:    03/06/20 1355   Temp: 98 °F (36.7 °C)   TempSrc: Temporal   Weight: 72.1 kg (159 lb)   Height: 165.1 cm (65\")   PainSc:   5       General: A and O x 3, ASA, NAD    SCLERA:    Normal    DENTITION:   Normal  Skin clear no unusual lesions noted  Left hip patient is tender over left hip greater trochanteric bursa she otherwise has normal internal X rotation with a negative Stinchfield negative logroll calf soft and nontender    Radiology:  Xrays 2 views of left hip ordered and reviewed today secondary to pain show minimal arthritic changes.  No compared to views available    Assessment/Plan:  Left hip greater trochanteric bursitis    Patient I discussed treatment options.  She would like to proceed with left hip bursa injection.  Prior to injection risks were discussed including pain infection.  Patient verbalized understanding would like to proceed with injection.  She was referred to outpatient physical therapy and we will otherwise see her back as needed    Large Joint Arthrocentesis: L greater trochanteric bursa  Date/Time: 3/9/2020 10:23 AM  Consent given by: patient  Site marked: site marked  Timeout: Immediately prior to procedure a time out was called to verify the correct patient, procedure, equipment, support staff and site/side marked as required   Supporting Documentation  Indications: pain   Procedure Details  Location: hip - L greater trochanteric bursa  Needle size: 22 G  Approach: lateral  Medications administered: 80 mg methylPREDNISolone acetate 80 MG/ML; 2 mL lidocaine (cardiac)          "

## 2021-01-04 ENCOUNTER — OFFICE VISIT (OUTPATIENT)
Dept: FAMILY MEDICINE CLINIC | Facility: CLINIC | Age: 76
End: 2021-01-04

## 2021-01-04 VITALS
RESPIRATION RATE: 14 BRPM | HEART RATE: 74 BPM | TEMPERATURE: 96.4 F | SYSTOLIC BLOOD PRESSURE: 120 MMHG | DIASTOLIC BLOOD PRESSURE: 80 MMHG | BODY MASS INDEX: 26.49 KG/M2 | HEIGHT: 65 IN | OXYGEN SATURATION: 98 % | WEIGHT: 159 LBS

## 2021-01-04 DIAGNOSIS — R35.0 URINARY FREQUENCY: Primary | ICD-10-CM

## 2021-01-04 PROCEDURE — 99213 OFFICE O/P EST LOW 20 MIN: CPT | Performed by: NURSE PRACTITIONER

## 2021-01-04 RX ORDER — LEVOTHYROXINE SODIUM 0.15 MG/1
112 TABLET ORAL DAILY
COMMUNITY
Start: 2020-09-25 | End: 2021-01-04 | Stop reason: SDUPTHER

## 2021-01-04 RX ORDER — LEVOTHYROXINE SODIUM 112 UG/1
112 TABLET ORAL DAILY
COMMUNITY
End: 2021-07-08 | Stop reason: DRUGHIGH

## 2021-01-04 NOTE — PROGRESS NOTES
"Subjective   Gloria Álvarez is a 75 y.o. female.   Urinary Tract Infection    History of Present Illness   Patient presents for follow-up for diverticulitis. She was seen and treated two days ago at an  with augmentin and flagyl. Patient reports that her stomach \"hurts\" when taking the medication. She states that she has been avoiding nuts, seeds and other potential foods. Patient reports that her abdominal pain is better at today's visit.     Patient is also being seen for c/o urinary frequency that started about 2 days ago, when her diverticulitis started. She was seen for this at  and states that she was \"unable to give a urine sample at visit\". Patient has been taking augmentin for 2 days.  She denies any dysuria or blood in her urine.     The following portions of the patient's history were reviewed and updated as appropriate: allergies, current medications, past family history, past medical history, past social history, past surgical history and problem list.  Pt here for follow up for uti states she believes she had a reaction to medication,some itching  Review of Systems   Constitutional: Positive for appetite change. Negative for chills, fatigue and fever.   Respiratory: Negative for cough and shortness of breath.    Cardiovascular: Negative for chest pain, palpitations and leg swelling.   Gastrointestinal: Positive for abdominal distention, abdominal pain, diarrhea and nausea. Negative for anal bleeding, blood in stool, constipation, rectal pain, vomiting, GERD and indigestion.   Genitourinary: Positive for frequency. Negative for dysuria, flank pain, hematuria, pelvic pain, pelvic pressure and urgency.   Neurological: Negative for dizziness and headache.       Objective   Physical Exam  Vitals signs and nursing note reviewed.   Constitutional:       Appearance: She is well-developed.   HENT:      Head: Normocephalic and atraumatic.      Right Ear: External ear normal.      Left Ear: External ear " normal.      Nose: Nose normal.   Eyes:      Conjunctiva/sclera: Conjunctivae normal.      Pupils: Pupils are equal, round, and reactive to light.   Neck:      Musculoskeletal: Normal range of motion and neck supple.      Thyroid: No thyromegaly.   Cardiovascular:      Rate and Rhythm: Normal rate and regular rhythm.      Heart sounds: Normal heart sounds. No murmur.   Pulmonary:      Effort: Pulmonary effort is normal.      Breath sounds: Normal breath sounds.   Abdominal:      General: Bowel sounds are normal. There is no distension.      Palpations: Abdomen is soft.      Tenderness: There is abdominal tenderness (lower center abdomen. ). There is no right CVA tenderness, left CVA tenderness, guarding or rebound.   Musculoskeletal: Normal range of motion.         General: No deformity.   Lymphadenopathy:      Cervical: No cervical adenopathy.   Skin:     General: Skin is warm and dry.   Neurological:      General: No focal deficit present.      Mental Status: She is alert and oriented to person, place, and time.      Cranial Nerves: No cranial nerve deficit.   Psychiatric:         Behavior: Behavior normal.         Thought Content: Thought content normal.         Judgment: Judgment normal.           Assessment/Plan   Problem List Items Addressed This Visit     None      Visit Diagnoses     Urinary frequency    -  Primary    Relevant Orders    Urine Culture - Urine, Urine, Clean Catch        Reviewed chart prior to visit.   UC note reviewed at visit.   Continue Augmentin as prescribed.  Continue flagyl as prescribed.  Morton diet.  Urine culture to check for UTI.        Return if symptoms worsen or fail to improve.       Patient Instructions   I will call you with your urine culture results.  Return if symptoms worsen or fail to improve.      Diverticulitis    Diverticulitis is infection or inflammation of small pouches (diverticula) in the colon that form due to a condition called diverticulosis. Diverticula can  trap stool (feces) and bacteria, causing infection and inflammation.  Diverticulitis may cause severe stomach pain and diarrhea. It may lead to tissue damage in the colon that causes bleeding. The diverticula may also burst (rupture) and cause infected stool to enter other areas of the abdomen.  Complications of diverticulitis can include:  · Bleeding.  · Severe infection.  · Severe pain.  · Rupture (perforation) of the colon.  · Blockage (obstruction) of the colon.  What are the causes?  This condition is caused by stool becoming trapped in the diverticula, which allows bacteria to grow in the diverticula. This leads to inflammation and infection.  What increases the risk?  You are more likely to develop this condition if:  · You have diverticulosis. The risk for diverticulosis increases if:  ? You are overweight or obese.  ? You use tobacco products.  ? You do not get enough exercise.  · You eat a diet that does not include enough fiber. High-fiber foods include fruits, vegetables, beans, nuts, and whole grains.  What are the signs or symptoms?  Symptoms of this condition may include:  · Pain and tenderness in the abdomen. The pain is normally located on the left side of the abdomen, but it may occur in other areas.  · Fever and chills.  · Bloating.  · Cramping.  · Nausea.  · Vomiting.  · Changes in bowel routines.  · Blood in your stool.  How is this diagnosed?  This condition is diagnosed based on:  · Your medical history.  · A physical exam.  · Tests to make sure there is nothing else causing your condition. These tests may include:  ? Blood tests.  ? Urine tests.  ? Imaging tests of the abdomen, including X-rays, ultrasounds, MRIs, or CT scans.  How is this treated?  Most cases of this condition are mild and can be treated at home. Treatment may include:  · Taking over-the-counter pain medicines.  · Following a clear liquid diet.  · Taking antibiotic medicines by mouth.  · Rest.  More severe cases may need to  be treated at a hospital. Treatment may include:  · Not eating or drinking.  · Taking prescription pain medicine.  · Receiving antibiotic medicines through an IV tube.  · Receiving fluids and nutrition through an IV tube.  · Surgery.  When your condition is under control, your health care provider may recommend that you have a colonoscopy. This is an exam to look at the entire large intestine. During the exam, a lubricated, bendable tube is inserted into the anus and then passed into the rectum, colon, and other parts of the large intestine. A colonoscopy can show how severe your diverticula are and whether something else may be causing your symptoms.  Follow these instructions at home:  Medicines  · Take over-the-counter and prescription medicines only as told by your health care provider. These include fiber supplements, probiotics, and stool softeners.  · If you were prescribed an antibiotic medicine, take it as told by your health care provider. Do not stop taking the antibiotic even if you start to feel better.  · Do not drive or use heavy machinery while taking prescription pain medicine.  General instructions    · Follow a full liquid diet or another diet as directed by your health care provider. After your symptoms improve, your health care provider may tell you to change your diet. He or she may recommend that you eat a diet that contains at least 25 g (25 grams) of fiber daily. Fiber makes it easier to pass stool. Healthy sources of fiber include:  ? Berries. One cup contains 4-8 grams of fiber.  ? Beans or lentils. One half cup contains 5-8 grams of fiber.  ? Green vegetables. One cup contains 4 grams of fiber.  · Exercise for at least 30 minutes, 3 times each week. You should exercise hard enough to raise your heart rate and break a sweat.  · Keep all follow-up visits as told by your health care provider. This is important. You may need a colonoscopy.  Contact a health care provider if:  · Your pain does  not improve.  · You have a hard time drinking or eating food.  · Your bowel movements do not return to normal.  Get help right away if:  · Your pain gets worse.  · Your symptoms do not get better with treatment.  · Your symptoms suddenly get worse.  · You have a fever.  · You vomit more than one time.  · You have stools that are bloody, black, or tarry.  Summary  · Diverticulitis is infection or inflammation of small pouches (diverticula) in the colon that form due to a condition called diverticulosis. Diverticula can trap stool (feces) and bacteria, causing infection and inflammation.  · You are at higher risk for this condition if you have diverticulosis and you eat a diet that does not include enough fiber.  · Most cases of this condition are mild and can be treated at home. More severe cases may need to be treated at a hospital.  · When your condition is under control, your health care provider may recommend that you have an exam called a colonoscopy. This exam can show how severe your diverticula are and whether something else may be causing your symptoms.  This information is not intended to replace advice given to you by your health care provider. Make sure you discuss any questions you have with your health care provider.  Document Revised: 11/30/2018 Document Reviewed: 01/20/2018  [employer] Patient Education © 2020 [employer]

## 2021-01-04 NOTE — PATIENT INSTRUCTIONS
I will call you with your urine culture results.  Return if symptoms worsen or fail to improve.      Diverticulitis    Diverticulitis is infection or inflammation of small pouches (diverticula) in the colon that form due to a condition called diverticulosis. Diverticula can trap stool (feces) and bacteria, causing infection and inflammation.  Diverticulitis may cause severe stomach pain and diarrhea. It may lead to tissue damage in the colon that causes bleeding. The diverticula may also burst (rupture) and cause infected stool to enter other areas of the abdomen.  Complications of diverticulitis can include:  · Bleeding.  · Severe infection.  · Severe pain.  · Rupture (perforation) of the colon.  · Blockage (obstruction) of the colon.  What are the causes?  This condition is caused by stool becoming trapped in the diverticula, which allows bacteria to grow in the diverticula. This leads to inflammation and infection.  What increases the risk?  You are more likely to develop this condition if:  · You have diverticulosis. The risk for diverticulosis increases if:  ? You are overweight or obese.  ? You use tobacco products.  ? You do not get enough exercise.  · You eat a diet that does not include enough fiber. High-fiber foods include fruits, vegetables, beans, nuts, and whole grains.  What are the signs or symptoms?  Symptoms of this condition may include:  · Pain and tenderness in the abdomen. The pain is normally located on the left side of the abdomen, but it may occur in other areas.  · Fever and chills.  · Bloating.  · Cramping.  · Nausea.  · Vomiting.  · Changes in bowel routines.  · Blood in your stool.  How is this diagnosed?  This condition is diagnosed based on:  · Your medical history.  · A physical exam.  · Tests to make sure there is nothing else causing your condition. These tests may include:  ? Blood tests.  ? Urine tests.  ? Imaging tests of the abdomen, including X-rays, ultrasounds, MRIs, or CT  scans.  How is this treated?  Most cases of this condition are mild and can be treated at home. Treatment may include:  · Taking over-the-counter pain medicines.  · Following a clear liquid diet.  · Taking antibiotic medicines by mouth.  · Rest.  More severe cases may need to be treated at a hospital. Treatment may include:  · Not eating or drinking.  · Taking prescription pain medicine.  · Receiving antibiotic medicines through an IV tube.  · Receiving fluids and nutrition through an IV tube.  · Surgery.  When your condition is under control, your health care provider may recommend that you have a colonoscopy. This is an exam to look at the entire large intestine. During the exam, a lubricated, bendable tube is inserted into the anus and then passed into the rectum, colon, and other parts of the large intestine. A colonoscopy can show how severe your diverticula are and whether something else may be causing your symptoms.  Follow these instructions at home:  Medicines  · Take over-the-counter and prescription medicines only as told by your health care provider. These include fiber supplements, probiotics, and stool softeners.  · If you were prescribed an antibiotic medicine, take it as told by your health care provider. Do not stop taking the antibiotic even if you start to feel better.  · Do not drive or use heavy machinery while taking prescription pain medicine.  General instructions    · Follow a full liquid diet or another diet as directed by your health care provider. After your symptoms improve, your health care provider may tell you to change your diet. He or she may recommend that you eat a diet that contains at least 25 g (25 grams) of fiber daily. Fiber makes it easier to pass stool. Healthy sources of fiber include:  ? Berries. One cup contains 4-8 grams of fiber.  ? Beans or lentils. One half cup contains 5-8 grams of fiber.  ? Green vegetables. One cup contains 4 grams of fiber.  · Exercise for at  least 30 minutes, 3 times each week. You should exercise hard enough to raise your heart rate and break a sweat.  · Keep all follow-up visits as told by your health care provider. This is important. You may need a colonoscopy.  Contact a health care provider if:  · Your pain does not improve.  · You have a hard time drinking or eating food.  · Your bowel movements do not return to normal.  Get help right away if:  · Your pain gets worse.  · Your symptoms do not get better with treatment.  · Your symptoms suddenly get worse.  · You have a fever.  · You vomit more than one time.  · You have stools that are bloody, black, or tarry.  Summary  · Diverticulitis is infection or inflammation of small pouches (diverticula) in the colon that form due to a condition called diverticulosis. Diverticula can trap stool (feces) and bacteria, causing infection and inflammation.  · You are at higher risk for this condition if you have diverticulosis and you eat a diet that does not include enough fiber.  · Most cases of this condition are mild and can be treated at home. More severe cases may need to be treated at a hospital.  · When your condition is under control, your health care provider may recommend that you have an exam called a colonoscopy. This exam can show how severe your diverticula are and whether something else may be causing your symptoms.  This information is not intended to replace advice given to you by your health care provider. Make sure you discuss any questions you have with your health care provider.  Document Revised: 11/30/2018 Document Reviewed: 01/20/2018  [employer] Patient Education © 2020 [employer]

## 2021-01-06 LAB
BACTERIA UR CULT: NO GROWTH
BACTERIA UR CULT: NORMAL

## 2021-01-07 ENCOUNTER — TELEPHONE (OUTPATIENT)
Dept: FAMILY MEDICINE CLINIC | Facility: CLINIC | Age: 76
End: 2021-01-07

## 2021-01-07 NOTE — TELEPHONE ENCOUNTER
PATIENT CALLED STATING THAT SHE IS ON amoxicillin-clavulanate (AUGMENTIN) 875-125 MG per tablet FOR A UTI. HER URINCE CATCH CAME BACK CLEAN AND SHE IS WANTING TO GET OFF OF THE AUGMENTIN.    PLEASE ADVISE  [phone]

## 2021-01-21 ENCOUNTER — IMMUNIZATION (OUTPATIENT)
Dept: VACCINE CLINIC | Facility: HOSPITAL | Age: 76
End: 2021-01-21

## 2021-01-21 PROCEDURE — 91300 HC SARSCOV02 VAC 30MCG/0.3ML IM: CPT | Performed by: INTERNAL MEDICINE

## 2021-01-21 PROCEDURE — 0001A: CPT | Performed by: INTERNAL MEDICINE

## 2021-02-11 ENCOUNTER — IMMUNIZATION (OUTPATIENT)
Dept: VACCINE CLINIC | Facility: HOSPITAL | Age: 76
End: 2021-02-11

## 2021-02-11 PROCEDURE — 0002A: CPT | Performed by: INTERNAL MEDICINE

## 2021-02-11 PROCEDURE — 91300 HC SARSCOV02 VAC 30MCG/0.3ML IM: CPT | Performed by: INTERNAL MEDICINE

## 2021-03-04 ENCOUNTER — APPOINTMENT (OUTPATIENT)
Dept: WOMENS IMAGING | Facility: HOSPITAL | Age: 76
End: 2021-03-04

## 2021-03-04 PROCEDURE — 77063 BREAST TOMOSYNTHESIS BI: CPT | Performed by: RADIOLOGY

## 2021-03-04 PROCEDURE — 77067 SCR MAMMO BI INCL CAD: CPT | Performed by: RADIOLOGY

## 2021-06-28 ENCOUNTER — TELEPHONE (OUTPATIENT)
Dept: FAMILY MEDICINE CLINIC | Facility: CLINIC | Age: 76
End: 2021-06-28

## 2021-06-28 NOTE — TELEPHONE ENCOUNTER
Caller: Gloria Álvarez    Relationship to patient: Self    Best call back number: [phone] ([phone])    Patient is needing: PLEASE BE ADVISED THAT A SUBSEQUENT MEDICARE WELLNESS VISIT HAS BEEN SCHEDULED WITH BRANDT DAVEY ON 7/8/21 DUE TO NO AVAILABILITY UNTIL October WITH MD ALLEN MCMAHAN. THANK YOU.

## 2021-06-29 DIAGNOSIS — R53.83 FATIGUE, UNSPECIFIED TYPE: ICD-10-CM

## 2021-06-29 DIAGNOSIS — E78.2 MODERATE MIXED HYPERLIPIDEMIA NOT REQUIRING STATIN THERAPY: Primary | ICD-10-CM

## 2021-06-29 DIAGNOSIS — Z13.1 DIABETES MELLITUS SCREENING: ICD-10-CM

## 2021-06-30 ENCOUNTER — LAB (OUTPATIENT)
Dept: FAMILY MEDICINE CLINIC | Facility: CLINIC | Age: 76
End: 2021-06-30

## 2021-06-30 DIAGNOSIS — Z13.1 DIABETES MELLITUS SCREENING: ICD-10-CM

## 2021-06-30 DIAGNOSIS — E78.2 MODERATE MIXED HYPERLIPIDEMIA NOT REQUIRING STATIN THERAPY: ICD-10-CM

## 2021-06-30 DIAGNOSIS — R53.83 FATIGUE, UNSPECIFIED TYPE: ICD-10-CM

## 2021-06-30 LAB
BASOPHILS # BLD AUTO: 0.07 10*3/MM3 (ref 0–0.2)
BASOPHILS NFR BLD AUTO: 0.7 % (ref 0–1.5)
EOSINOPHIL # BLD AUTO: 0.13 10*3/MM3 (ref 0–0.4)
EOSINOPHIL NFR BLD AUTO: 1.4 % (ref 0.3–6.2)
ERYTHROCYTE [DISTWIDTH] IN BLOOD BY AUTOMATED COUNT: 11.8 % (ref 12.3–15.4)
HCT VFR BLD AUTO: 42.9 % (ref 34–46.6)
HGB BLD-MCNC: 14.5 G/DL (ref 12–15.9)
IMM GRANULOCYTES # BLD AUTO: 0.02 10*3/MM3 (ref 0–0.05)
IMM GRANULOCYTES NFR BLD AUTO: 0.2 % (ref 0–0.5)
LYMPHOCYTES # BLD AUTO: 1.4 10*3/MM3 (ref 0.7–3.1)
LYMPHOCYTES NFR BLD AUTO: 14.7 % (ref 19.6–45.3)
MCH RBC QN AUTO: 30.1 PG (ref 26.6–33)
MCHC RBC AUTO-ENTMCNC: 33.8 G/DL (ref 31.5–35.7)
MCV RBC AUTO: 89 FL (ref 79–97)
MONOCYTES # BLD AUTO: 0.61 10*3/MM3 (ref 0.1–0.9)
MONOCYTES NFR BLD AUTO: 6.4 % (ref 5–12)
NEUTROPHILS # BLD AUTO: 7.28 10*3/MM3 (ref 1.7–7)
NEUTROPHILS NFR BLD AUTO: 76.6 % (ref 42.7–76)
NRBC BLD AUTO-RTO: 0 /100 WBC (ref 0–0.2)
PLATELET # BLD AUTO: 295 10*3/MM3 (ref 140–450)
RBC # BLD AUTO: 4.82 10*6/MM3 (ref 3.77–5.28)
WBC # BLD AUTO: 9.51 10*3/MM3 (ref 3.4–10.8)

## 2021-07-01 LAB
ALBUMIN SERPL-MCNC: 4.5 G/DL (ref 3.5–5.2)
ALBUMIN/GLOB SERPL: 1.6 G/DL
ALP SERPL-CCNC: 90 U/L (ref 39–117)
ALT SERPL-CCNC: 11 U/L (ref 1–33)
AST SERPL-CCNC: 18 U/L (ref 1–32)
BILIRUB SERPL-MCNC: 0.4 MG/DL (ref 0–1.2)
BUN SERPL-MCNC: 10 MG/DL (ref 8–23)
BUN/CREAT SERPL: 11.9 (ref 7–25)
CALCIUM SERPL-MCNC: 9.9 MG/DL (ref 8.6–10.5)
CHLORIDE SERPL-SCNC: 106 MMOL/L (ref 98–107)
CHOLEST SERPL-MCNC: 166 MG/DL (ref 0–200)
CHOLEST/HDLC SERPL: 2.86 {RATIO}
CO2 SERPL-SCNC: 23.9 MMOL/L (ref 22–29)
CREAT SERPL-MCNC: 0.84 MG/DL (ref 0.57–1)
GLOBULIN SER CALC-MCNC: 2.8 GM/DL
GLUCOSE SERPL-MCNC: 93 MG/DL (ref 65–99)
HDLC SERPL-MCNC: 58 MG/DL (ref 40–60)
LDLC SERPL CALC-MCNC: 88 MG/DL (ref 0–100)
POTASSIUM SERPL-SCNC: 4 MMOL/L (ref 3.5–5.2)
PROT SERPL-MCNC: 7.3 G/DL (ref 6–8.5)
SODIUM SERPL-SCNC: 142 MMOL/L (ref 136–145)
TRIGL SERPL-MCNC: 113 MG/DL (ref 0–150)
VLDLC SERPL CALC-MCNC: 20 MG/DL (ref 5–40)

## 2021-07-08 ENCOUNTER — TELEPHONE (OUTPATIENT)
Dept: FAMILY MEDICINE CLINIC | Facility: CLINIC | Age: 76
End: 2021-07-08

## 2021-07-08 ENCOUNTER — OFFICE VISIT (OUTPATIENT)
Dept: FAMILY MEDICINE CLINIC | Facility: CLINIC | Age: 76
End: 2021-07-08

## 2021-07-08 DIAGNOSIS — Z00.00 MEDICARE ANNUAL WELLNESS VISIT, SUBSEQUENT: ICD-10-CM

## 2021-07-08 DIAGNOSIS — Z11.59 NEED FOR HEPATITIS C SCREENING TEST: Primary | ICD-10-CM

## 2021-07-08 DIAGNOSIS — E03.9 HYPOTHYROIDISM, UNSPECIFIED TYPE: ICD-10-CM

## 2021-07-08 PROCEDURE — 96160 PT-FOCUSED HLTH RISK ASSMT: CPT | Performed by: NURSE PRACTITIONER

## 2021-07-08 PROCEDURE — G0439 PPPS, SUBSEQ VISIT: HCPCS | Performed by: NURSE PRACTITIONER

## 2021-07-08 RX ORDER — HYDROXYZINE HYDROCHLORIDE 10 MG/1
10 TABLET, FILM COATED ORAL
COMMUNITY
Start: 2021-07-01 | End: 2021-09-02

## 2021-07-08 RX ORDER — LEVOTHYROXINE SODIUM 137 UG/1
137 TABLET ORAL DAILY
COMMUNITY

## 2021-07-08 RX ORDER — TRIAMCINOLONE ACETONIDE 1 MG/G
CREAM TOPICAL AS NEEDED
COMMUNITY
Start: 2021-07-01 | End: 2023-03-14

## 2021-07-08 NOTE — TELEPHONE ENCOUNTER
Caller: Gloria Álvarez    Relationship to patient: Self    Best call back number: [phone]    Patient is needing: PATIENT IS CALLING TO STATE THAT HER WEIGHT WAS INCORRECT ON THE CLINICAL SUMMARY.  SHE STATES THAT WHEN SHE WEIGHED THIS MORNING AT THE DR OFFICE SHE WEIGHED 159.  SHE STATES SHE WOULD LIKE THIS TO BE CORRECT.    PLEASE ADVISE.

## 2021-07-08 NOTE — PROGRESS NOTES
Medicare Subsequent Wellness Visit  Subjective   History of Present Illness    Gloria Álvarez is a 75 y.o. female who presents for an Medicare Wellness Visit. In addition, we addressed the following health issues:    Doing well has no complaints currently is only on levothyroxine for her thyroid.  PMH, PSH, SocHx, FamHx, Allergies, and Medications: Reviewed and updated in the Visit Navigator.   Family History   Problem Relation Age of Onset   • Hypertension Mother    • Hypertension Brother    • Malig Hyperthermia Neg Hx        Social History     Social History Narrative   • Not on file       Allergies   Allergen Reactions   • Ciprofloxacin Itching     JOINTS ACHY  JOINTS ACHY  JOINTS ACHY   • Sulfa Antibiotics Itching   • Varicella Virus Vaccine Live Other (See Comments)     FLU LIKE SYMPTOMS, RED AT SITE   • Zoster Vaccine Live Other (See Comments) and Rash     FLU LIKE SYMPTOMS, RED AT SITE  FLU LIKE SYMPTOMS, RED AT SITE  Other reaction(s): Other (See Comments)  FLU LIKE SYMPTOMS, RED AT SITE       Outpatient Medications Prior to Visit   Medication Sig Dispense Refill   • Aspirin Buf,CaCarb-MgCarb-MgO, 81 MG tablet Take 81 mg by mouth Daily.     • Calcium Carbonate-Vit D-Min (CALTRATE 600+D PLUS MINERALS) 600-800 MG-UNIT chewable tablet Chew 1 tablet Daily.     • Cholecalciferol (VITAMIN D3) 1000 units capsule Take  by mouth.     • hydrOXYzine (ATARAX) 10 MG tablet Take 10 mg by mouth every night at bedtime.     • levothyroxine (SYNTHROID, LEVOTHROID) 137 MCG tablet Take 137 mcg by mouth Daily. Dr. Horton is controlling     • triamcinolone (KENALOG) 0.1 % cream APPLY TOPICALLY TO RASH TWICE DAILY AS DIRECTED FOR ONE TO TWO WEEKS     • vitamin E 400 UNIT capsule Take 400 Units by mouth Daily.     • levothyroxine (SYNTHROID, LEVOTHROID) 112 MCG tablet Take 112 mcg by mouth Daily.     • Lutein 6 MG capsule Take 6 mg by mouth Daily. Take as directed.       No facility-administered medications prior to visit.         Patient Active Problem List   Diagnosis   • Papillary carcinoma of thyroid (CMS/HCC)   • Atrophic vaginitis   • Chronic interstitial cystitis   • Hyperlipidemia   • Insomnia   • Postoperative hypothyroidism   • Hx of benign carcinoid tumor   • Hx of diverticulitis of colon   • Age-related osteoporosis without current pathological fracture   • Closed subcapital fracture of left femur (CMS/HCC)   • Hypokalemia         Health Habits:  Dental Exam.UTD  Eye Exam. UTD  Exercise:Walks  Current exercise activities include:Exercises daily and walks quite a bit    Recent Hospitalizations:  none    Age-appropriate Screening Schedule:  Refer to the list below for future screening recommendations based on patient's age. Orders for these recommended tests are listed in the plan section. The patient has been provided with a written plan.    Health Maintenance   Topic Date Due   • HEPATITIS C SCREENING  Never done   • INFLUENZA VACCINE  08/01/2021   • DXA SCAN  12/05/2021   • LIPID PANEL  06/30/2022   • COLORECTAL CANCER SCREENING  07/05/2022   • ANNUAL WELLNESS VISIT  07/08/2022   • MAMMOGRAM  03/05/2023   • TDAP/TD VACCINES (2 - Td or Tdap) 09/07/2028   • COVID-19 Vaccine  Completed   • Pneumococcal Vaccine 65+  Completed       Depression Screen:   PHQ-2/PHQ-9 Depression Screening 3/28/2019   Little interest or pleasure in doing things 0   Feeling down, depressed, or hopeless 0   Total Score 0       Functional and Cognitive Screening:  Functional & Cognitive Status 7/8/2021   Do you have difficulty preparing food and eating? -   Do you have difficulty bathing yourself, getting dressed or grooming yourself? -   Do you have difficulty using the toilet? -   Do you have difficulty moving around from place to place? -   Do you have trouble with steps or getting out of a bed or a chair? -   Current Diet Well Balanced Diet   Dental Exam Up to date   Eye Exam Up to date   Exercise (times per week) 7 times per week   Current Exercises  "Include Home Exercise Program (TV, Computer, Etc.)        Exercise Comment silver sneakers at home and walking   Current Exercise Activities Include -   Do you need help using the phone?  No   Are you deaf or do you have serious difficulty hearing?  No   Do you need help with transportation? No   Do you need help shopping? No   Do you need help preparing meals?  No   Do you need help with housework?  No   Do you need help with laundry? No   Do you need help taking your medications? No   Do you need help managing money? No   Do you ever drive or ride in a car without wearing a seat belt? No   Have you felt unusual stress, anger or loneliness in the last month? No   Who do you live with? Spouse   If you need help, do you have trouble finding someone available to you? No   Have you been bothered in the last four weeks by sexual problems? No   Do you have difficulty concentrating, remembering or making decisions? No       Does the patient have evidence of cognitive impairment? No        Review of Systems    Objective     Vitals:    07/08/21 0957   BP: 144/74   Pulse: 74   Resp: 14   SpO2: 100%   Weight: 83.9 kg (185 lb)   Height: 165.1 cm (65\")       Body mass index is 30.79 kg/m².    PHYSICAL EXAM  Vitals reviewed and on chart.  HEENT: PERRLA, EOMI. Oral mucosa moist,   No LAD.  CV: RRR, no murmurs, rubs, clicks or gallops  LUNGS: CTA bilaterally  EXT: No edema, FROM in bilateral upper and lower ext  NEURO: CN II - XII grossly intact        ASSESSMENT AND PLAN      Problems Addressed this Visit     None      Visit Diagnoses     Need for hepatitis C screening test    -  Primary      Diagnoses       Codes Comments    Need for hepatitis C screening test    -  Primary ICD-10-CM: Z11.59  ICD-9-CM: V73.89           Orders:  No orders of the defined types were placed in this encounter.      Follow Up:  No follow-ups on file.     An After Visit Summary and PPPS with all of these plans were given to the patient.    Preventative " counseling for diet and exercise with patient at visit.  Pt reports that they wear their seatbelt regularly.

## 2021-07-23 VITALS
BODY MASS INDEX: 26.33 KG/M2 | WEIGHT: 158 LBS | RESPIRATION RATE: 14 BRPM | HEIGHT: 65 IN | SYSTOLIC BLOOD PRESSURE: 144 MMHG | DIASTOLIC BLOOD PRESSURE: 74 MMHG | HEART RATE: 74 BPM | OXYGEN SATURATION: 100 %

## 2021-08-05 ENCOUNTER — TELEPHONE (OUTPATIENT)
Dept: ORTHOPEDIC SURGERY | Facility: CLINIC | Age: 76
End: 2021-08-05

## 2021-08-05 NOTE — TELEPHONE ENCOUNTER
Provider: URMILA  Caller: [phone]  Relationship to Patient: SELF   Phone Number: [phone]  Reason for Call: PT IS WONDERING IS SHE WOULD STILL NEED THE ANTIBIOTIC TO HAVE DENTAL WORK 8/10/24 DONE 4 YEARS AFTER HER HIP REPLACEMENT

## 2021-08-05 NOTE — TELEPHONE ENCOUNTER
Please let patient know that it is Dr. Ford's recommendation that patients premedicate only 2 years following joint replacement surgery

## 2021-08-05 NOTE — TELEPHONE ENCOUNTER
Caller: SANDY DAMIAN    Relationship to patient: SELF    Best call back number: [phone]    Patient is needing:   PATIENT CALLED BACK TO DISCUSS GABINO KEARNS'S RECOMMENDATION.

## 2021-09-02 ENCOUNTER — OFFICE VISIT (OUTPATIENT)
Dept: FAMILY MEDICINE CLINIC | Facility: CLINIC | Age: 76
End: 2021-09-02

## 2021-09-02 VITALS
SYSTOLIC BLOOD PRESSURE: 142 MMHG | BODY MASS INDEX: 25.83 KG/M2 | HEART RATE: 79 BPM | HEIGHT: 65 IN | WEIGHT: 155 LBS | OXYGEN SATURATION: 98 % | RESPIRATION RATE: 14 BRPM | DIASTOLIC BLOOD PRESSURE: 72 MMHG

## 2021-09-02 DIAGNOSIS — N30.10 INTERSTITIAL CYSTITIS: Primary | ICD-10-CM

## 2021-09-02 PROCEDURE — 99213 OFFICE O/P EST LOW 20 MIN: CPT | Performed by: NURSE PRACTITIONER

## 2021-09-02 NOTE — PROGRESS NOTES
Subjective   Gloria Álvarez is a 75 y.o. female.   Urinary Tract Infection    History of Present Illness   History of what she thought was UTI symptoms and has been negative for a while and she thinks she may have interstitial cystitis.  She has some pelvic pressure and urgency and some coccyx pain.     The following portions of the patient's history were reviewed and updated as appropriate: allergies, current medications, past family history, past medical history, past social history, past surgical history and problem list.    Review of Systems   Constitutional: Negative for activity change and appetite change.   HENT: Negative for congestion.    Genitourinary: Positive for dysuria, frequency, pelvic pain, pelvic pressure and urgency. Negative for difficulty urinating.       Objective   Physical Exam  Vitals and nursing note reviewed.   Constitutional:       Appearance: She is well-developed.   HENT:      Head: Normocephalic and atraumatic.   Eyes:      Pupils: Pupils are equal, round, and reactive to light.   Pulmonary:      Effort: Pulmonary effort is normal.   Musculoskeletal:         General: Normal range of motion.   Skin:     General: Skin is warm and dry.   Neurological:      Mental Status: She is alert and oriented to person, place, and time.           Assessment/Plan   Problem List Items Addressed This Visit     None      Visit Diagnoses     Interstitial cystitis    -  Primary    Relevant Orders    Ambulatory Referral to Urology        Referral placed to urology for evaluation       No follow-ups on file.

## 2021-09-03 ENCOUNTER — TELEPHONE (OUTPATIENT)
Dept: FAMILY MEDICINE CLINIC | Facility: CLINIC | Age: 76
End: 2021-09-03

## 2021-09-03 NOTE — TELEPHONE ENCOUNTER
PATIENT IS CALLING TO FIND OUT WHO BRANDT DAVEY WILL BE SENDING HER TO @ [facility]  [address].  PLEASE CONTACT HER @ [phone] WITH THE PHYSICIAN'S NAME.

## 2021-09-08 DIAGNOSIS — R10.2 PELVIC PRESSURE IN FEMALE: Primary | ICD-10-CM

## 2021-10-14 DIAGNOSIS — R91.1 NODULE OF RIGHT LUNG: Primary | ICD-10-CM

## 2021-10-15 ENCOUNTER — HOSPITAL ENCOUNTER (OUTPATIENT)
Dept: CT IMAGING | Facility: HOSPITAL | Age: 76
Discharge: HOME | End: 2021-10-15
Admitting: FAMILY MEDICINE

## 2021-10-15 DIAGNOSIS — R91.1 NODULE OF RIGHT LUNG: ICD-10-CM

## 2021-10-15 LAB — CREAT BLDA-MCNC: 0.8 MG/DL (ref 0.6–1.3)

## 2021-10-15 PROCEDURE — 82565 ASSAY OF CREATININE: CPT

## 2021-10-15 PROCEDURE — 25010000002 IOPAMIDOL 61 % SOLUTION: Performed by: FAMILY MEDICINE

## 2021-10-15 PROCEDURE — 71260 CT THORAX DX C+: CPT

## 2021-10-15 RX ADMIN — IOPAMIDOL 75 ML: 612 INJECTION, SOLUTION INTRAVENOUS at 15:48

## 2021-10-18 ENCOUNTER — TELEPHONE (OUTPATIENT)
Dept: FAMILY MEDICINE CLINIC | Facility: CLINIC | Age: 76
End: 2021-10-18

## 2021-10-18 NOTE — TELEPHONE ENCOUNTER
Caller: Gloria Álvarez    Relationship: Self    Best call back number: [phone]    Caller requesting test results: SELF     What test was performed: CT SCAN OF RIGHT LUNG    When was the test performed: 10/15    Where was the test performed: [facility]

## 2021-10-18 NOTE — TELEPHONE ENCOUNTER
Please call and let the patient know that as of this time of the day her CT has not been read by radiology yet.

## 2021-10-19 DIAGNOSIS — R93.89 ABNORMAL CT OF THE CHEST: Primary | ICD-10-CM

## 2021-10-27 ENCOUNTER — OFFICE VISIT (OUTPATIENT)
Dept: OTHER | Facility: CLINIC | Age: 76
End: 2021-10-27

## 2021-10-27 VITALS
OXYGEN SATURATION: 99 % | HEART RATE: 86 BPM | DIASTOLIC BLOOD PRESSURE: 82 MMHG | WEIGHT: 155 LBS | BODY MASS INDEX: 25.83 KG/M2 | HEIGHT: 65 IN | SYSTOLIC BLOOD PRESSURE: 148 MMHG

## 2021-10-27 DIAGNOSIS — R91.1 LUNG NODULE: Primary | ICD-10-CM

## 2021-10-27 DIAGNOSIS — R91.8 LUNG NODULES: Primary | ICD-10-CM

## 2021-10-27 PROCEDURE — 99205 OFFICE O/P NEW HI 60 MIN: CPT | Performed by: THORACIC SURGERY (CARDIOTHORACIC VASCULAR SURGERY)

## 2021-10-27 NOTE — PROGRESS NOTES
"Chief Complaint  Bilateral lung nodules  Subjective          Gloria Álvarez presents to [facility] in consultation.  History of Present Illness  Ms. Álvarez is a pleasant 75-year-old lady who on October 10, 2021 noticed some hemoptysis, cough. She was heading out of town for a trip and along the way was evaluated emergency department with a chest x-ray which demonstrated a right-sided lung nodule. She decided to return home and obtain further work-up with a CT of the chest. She is in her usual state of health. Her cough has resolved. She denies any significant dyspnea on exertion.    She has a personal history of thyroid cancer. She had a thyroidectomy in 2012. She is a never smoker with no family history.  Objective   Vital Signs:   /82 (BP Location: Right arm, Patient Position: Sitting, Cuff Size: Adult)   Pulse 86   Ht 165.1 cm (65\")   Wt 70.3 kg (155 lb)   SpO2 99%   BMI 25.79 kg/m²     Physical Exam  Vitals and nursing note reviewed.   Constitutional:       Appearance: She is well-developed.   HENT:      Head: Normocephalic and atraumatic.      Nose: Nose normal.   Eyes:      Conjunctiva/sclera: Conjunctivae normal.   Cardiovascular:      Rate and Rhythm: Normal rate.   Pulmonary:      Effort: Pulmonary effort is normal.   Abdominal:      Palpations: Abdomen is soft.   Musculoskeletal:      Cervical back: Neck supple.   Skin:     General: Skin is warm and dry.   Neurological:      Mental Status: She is alert and oriented to person, place, and time.   Psychiatric:         Behavior: Behavior normal.         Thought Content: Thought content normal.         Judgment: Judgment normal.        Result Review :       Data reviewed: Radiologic studies :     I have independently reviewed the CT of the chest performed on 10/15/2021 which demonstrates bilateral pulmonary nodules. The largest of which is in the right middle lobe and measures 1.6 cm. There is a left upper lobe nodule that " measures 1.1 cm. There are a few scattered small nodules as well. There is no mediastinal or hilar lymphadenopathy. There is no pleural pericardial effusion. There is a 1.7 cm right adrenal nodule that is indeterminate.     Assessment and Plan      Ms. Álvarez is a pleasant 75-year-old lady with bilateral lung nodules, the largest of which measuring 1.6 cm in the right middle lobe and 1.1 cm in the left upper lobe. While this could certainly represent a malignancy, this could also represent an plantar process. This was discussed at length with the patient and her  today. I would like to obtain a PET CT scan to better qualify the activity of both of these larger nodules as well as the adrenal nodule to determine if a biopsy is warranted. I will plan to see her back after the PET CT scan. We will also plan to obtain pulmonary function studies in case an operative intervention is required.  Diagnoses and all orders for this visit:    1. Lung nodules (Primary)      I spent 60 minutes caring for Gloria on this date of service. This time includes time spent by me in the following activities:preparing for the visit, reviewing tests, obtaining and/or reviewing a separately obtained history, performing a medically appropriate examination and/or evaluation , counseling and educating the patient/family/caregiver, ordering medications, tests, or procedures, referring and communicating with other health care professionals , documenting information in the medical record and independently interpreting results and communicating that information with the patient/family/caregiver  Follow Up   No follow-ups on file.  Patient was given instructions and counseling regarding her condition or for health maintenance advice. Please see specific information pulled into the AVS if appropriate.

## 2021-11-01 ENCOUNTER — HOSPITAL ENCOUNTER (OUTPATIENT)
Dept: PET IMAGING | Facility: HOSPITAL | Age: 76
Discharge: HOME | End: 2021-11-01

## 2021-11-01 DIAGNOSIS — R91.1 LUNG NODULE: ICD-10-CM

## 2021-11-01 LAB — GLUCOSE BLDC GLUCOMTR-MCNC: 93 MG/DL (ref 70–130)

## 2021-11-01 PROCEDURE — 78815 PET IMAGE W/CT SKULL-THIGH: CPT

## 2021-11-01 PROCEDURE — 82962 GLUCOSE BLOOD TEST: CPT

## 2021-11-01 PROCEDURE — A9552 F18 FDG: HCPCS | Performed by: THORACIC SURGERY (CARDIOTHORACIC VASCULAR SURGERY)

## 2021-11-01 PROCEDURE — 0 FLUDEOXYGLUCOSE F18 SOLUTION: Performed by: THORACIC SURGERY (CARDIOTHORACIC VASCULAR SURGERY)

## 2021-11-01 RX ADMIN — FLUDEOXYGLUCOSE F18 1 DOSE: 300 INJECTION INTRAVENOUS at 10:42

## 2021-11-03 ENCOUNTER — OFFICE VISIT (OUTPATIENT)
Dept: OTHER | Facility: CLINIC | Age: 76
End: 2021-11-03

## 2021-11-03 VITALS
BODY MASS INDEX: 25.83 KG/M2 | WEIGHT: 155 LBS | HEART RATE: 86 BPM | OXYGEN SATURATION: 98 % | DIASTOLIC BLOOD PRESSURE: 75 MMHG | HEIGHT: 65 IN | SYSTOLIC BLOOD PRESSURE: 115 MMHG | RESPIRATION RATE: 16 BRPM

## 2021-11-03 DIAGNOSIS — J98.59 MEDIASTINAL MASS: ICD-10-CM

## 2021-11-03 DIAGNOSIS — R91.1 LUNG NODULE: Primary | ICD-10-CM

## 2021-11-03 PROCEDURE — 99215 OFFICE O/P EST HI 40 MIN: CPT | Performed by: THORACIC SURGERY (CARDIOTHORACIC VASCULAR SURGERY)

## 2021-11-03 NOTE — PROGRESS NOTES
"Chief Complaint  Paratracheal mass, lung nodules  Subjective          Gloria Álvarez presents to [facility]  History of Present Illness  Ms. Álvarez is a pleasant 75-year-old lady who on October 10, 2021 noticed some hemoptysis, cough. She was heading out of town for a trip and along the way was evaluated emergency department with a chest x-ray which demonstrated a right-sided lung nodule. She decided to return home and obtain further work-up with a CT of the chest. She is in her usual state of health. Her cough has resolved. She denies any significant dyspnea on exertion.     She has a personal history of thyroid cancer. She had a thyroidectomy in 2012. She is a never smoker with no family history.  Objective   Vital Signs:   /75   Pulse 86   Resp 16   Ht 165.1 cm (65\")   Wt 70.3 kg (155 lb)   SpO2 98%   BMI 25.79 kg/m²     Physical Exam  Vitals and nursing note reviewed.   Constitutional:       Appearance: She is well-developed.   HENT:      Head: Normocephalic and atraumatic.      Nose: Nose normal.   Eyes:      Conjunctiva/sclera: Conjunctivae normal.   Cardiovascular:      Rate and Rhythm: Normal rate.   Pulmonary:      Effort: Pulmonary effort is normal.   Abdominal:      Palpations: Abdomen is soft.   Musculoskeletal:      Cervical back: Neck supple.   Skin:     General: Skin is warm and dry.   Neurological:      Mental Status: She is alert and oriented to person, place, and time.   Psychiatric:         Behavior: Behavior normal.         Thought Content: Thought content normal.         Judgment: Judgment normal.        Result Review :       Data reviewed: Radiologic studies :     I have independently reviewed the PET CT scan performed 11/1/2021 which demonstrates a hypermetabolic 2.8 cm mass along the proximal right tracheal wall.  There are 3 dominant pulmonary nodules that are also hypermetabolic.  An adrenal adenoma.  No other evidence of metastatic disease.     Assessment " and Plan      Ms. Álvarez is a pleasant 75-year-old lady with a proximal tracheal mass as well as 3 dominant pulmonary nodules that are hypermetabolic and concerning for metastatic disease.  I have discussed her case with Dr. Clark Richards who will plan to proceed with bronchoscopy with biopsy of her tracheal mass as well as navigation and biopsy of the lesions in the lung.  I will plan to see her back after she undergoes these biopsies.  There are no diagnoses linked to this encounter.  I spent 40 minutes caring for Gloria on this date of service. This time includes time spent by me in the following activities:preparing for the visit, reviewing tests, obtaining and/or reviewing a separately obtained history, performing a medically appropriate examination and/or evaluation , counseling and educating the patient/family/caregiver, ordering medications, tests, or procedures, referring and communicating with other health care professionals  and independently interpreting results and communicating that information with the patient/family/caregiver  Follow Up   No follow-ups on file.  Patient was given instructions and counseling regarding her condition or for health maintenance advice. Please see specific information pulled into the AVS if appropriate.

## 2021-11-08 ENCOUNTER — TRANSCRIBE ORDERS (OUTPATIENT)
Dept: ADMINISTRATIVE | Facility: HOSPITAL | Age: 76
End: 2021-11-08

## 2021-11-08 DIAGNOSIS — R91.8 LUNG NODULES: Primary | ICD-10-CM

## 2021-11-08 DIAGNOSIS — Z01.818 OTHER SPECIFIED PRE-OPERATIVE EXAMINATION: ICD-10-CM

## 2021-11-09 ENCOUNTER — HOSPITAL ENCOUNTER (OUTPATIENT)
Dept: CT IMAGING | Facility: HOSPITAL | Age: 76
Discharge: HOME | End: 2021-11-09

## 2021-11-09 ENCOUNTER — LAB (OUTPATIENT)
Dept: LAB | Facility: HOSPITAL | Age: 76
End: 2021-11-09

## 2021-11-09 ENCOUNTER — APPOINTMENT (OUTPATIENT)
Dept: CT IMAGING | Facility: HOSPITAL | Age: 76
End: 2021-11-09

## 2021-11-09 DIAGNOSIS — Z01.818 OTHER SPECIFIED PRE-OPERATIVE EXAMINATION: ICD-10-CM

## 2021-11-09 DIAGNOSIS — R91.8 LUNG NODULES: ICD-10-CM

## 2021-11-09 LAB — SARS-COV-2 ORF1AB RESP QL NAA+PROBE: NOT DETECTED

## 2021-11-09 PROCEDURE — U0004 COV-19 TEST NON-CDC HGH THRU: HCPCS

## 2021-11-09 PROCEDURE — C9803 HOPD COVID-19 SPEC COLLECT: HCPCS

## 2021-11-09 PROCEDURE — 71250 CT THORAX DX C-: CPT

## 2021-11-11 ENCOUNTER — ANESTHESIA EVENT (OUTPATIENT)
Dept: GASTROENTEROLOGY | Facility: HOSPITAL | Age: 76
End: 2021-11-11

## 2021-11-11 ENCOUNTER — ANESTHESIA (OUTPATIENT)
Dept: GASTROENTEROLOGY | Facility: HOSPITAL | Age: 76
End: 2021-11-11

## 2021-11-11 ENCOUNTER — HOSPITAL ENCOUNTER (OUTPATIENT)
Facility: HOSPITAL | Age: 76
Setting detail: HOSPITAL OUTPATIENT SURGERY
Discharge: HOME | End: 2021-11-11
Attending: INTERNAL MEDICINE | Admitting: INTERNAL MEDICINE

## 2021-11-11 ENCOUNTER — APPOINTMENT (OUTPATIENT)
Dept: GENERAL RADIOLOGY | Facility: HOSPITAL | Age: 76
End: 2021-11-11

## 2021-11-11 VITALS
HEIGHT: 65 IN | DIASTOLIC BLOOD PRESSURE: 70 MMHG | RESPIRATION RATE: 16 BRPM | BODY MASS INDEX: 26.12 KG/M2 | HEART RATE: 75 BPM | WEIGHT: 156.8 LBS | OXYGEN SATURATION: 98 % | SYSTOLIC BLOOD PRESSURE: 120 MMHG

## 2021-11-11 DIAGNOSIS — R91.1 LUNG NODULE: ICD-10-CM

## 2021-11-11 LAB
B PARAPERT DNA SPEC QL NAA+PROBE: NOT DETECTED
B PERT DNA SPEC QL NAA+PROBE: NOT DETECTED
C PNEUM DNA NPH QL NAA+NON-PROBE: NOT DETECTED
FLUAV SUBTYP SPEC NAA+PROBE: NOT DETECTED
FLUBV RNA ISLT QL NAA+PROBE: NOT DETECTED
GIE STN SPEC: NORMAL
HADV DNA SPEC NAA+PROBE: NOT DETECTED
HCOV 229E RNA SPEC QL NAA+PROBE: NOT DETECTED
HCOV HKU1 RNA SPEC QL NAA+PROBE: NOT DETECTED
HCOV NL63 RNA SPEC QL NAA+PROBE: NOT DETECTED
HCOV OC43 RNA SPEC QL NAA+PROBE: NOT DETECTED
HMPV RNA NPH QL NAA+NON-PROBE: NOT DETECTED
HPIV1 RNA SPEC QL NAA+PROBE: NOT DETECTED
HPIV2 RNA SPEC QL NAA+PROBE: NOT DETECTED
HPIV3 RNA NPH QL NAA+PROBE: NOT DETECTED
HPIV4 P GENE NPH QL NAA+PROBE: NOT DETECTED
M PNEUMO IGG SER IA-ACNC: NOT DETECTED
RHINOVIRUS RNA SPEC NAA+PROBE: NOT DETECTED
RSV RNA NPH QL NAA+NON-PROBE: NOT DETECTED

## 2021-11-11 PROCEDURE — 87206 SMEAR FLUORESCENT/ACID STAI: CPT | Performed by: INTERNAL MEDICINE

## 2021-11-11 PROCEDURE — 87496 CYTOMEG DNA AMP PROBE: CPT | Performed by: INTERNAL MEDICINE

## 2021-11-11 PROCEDURE — 25010000002 PROPOFOL 10 MG/ML EMULSION: Performed by: ANESTHESIOLOGY

## 2021-11-11 PROCEDURE — C1726 CATH, BAL DIL, NON-VASCULAR: HCPCS | Performed by: INTERNAL MEDICINE

## 2021-11-11 PROCEDURE — 87633 RESP VIRUS 12-25 TARGETS: CPT | Performed by: INTERNAL MEDICINE

## 2021-11-11 PROCEDURE — 87116 MYCOBACTERIA CULTURE: CPT | Performed by: INTERNAL MEDICINE

## 2021-11-11 PROCEDURE — 87102 FUNGUS ISOLATION CULTURE: CPT | Performed by: INTERNAL MEDICINE

## 2021-11-11 PROCEDURE — 88305 TISSUE EXAM BY PATHOLOGIST: CPT | Performed by: INTERNAL MEDICINE

## 2021-11-11 PROCEDURE — 88112 CYTOPATH CELL ENHANCE TECH: CPT | Performed by: INTERNAL MEDICINE

## 2021-11-11 PROCEDURE — 87071 CULTURE AEROBIC QUANT OTHER: CPT | Performed by: INTERNAL MEDICINE

## 2021-11-11 PROCEDURE — 87205 SMEAR GRAM STAIN: CPT | Performed by: INTERNAL MEDICINE

## 2021-11-11 PROCEDURE — 76000 FLUOROSCOPY <1 HR PHYS/QHP: CPT

## 2021-11-11 PROCEDURE — 71045 X-RAY EXAM CHEST 1 VIEW: CPT

## 2021-11-11 PROCEDURE — 88312 SPECIAL STAINS GROUP 1: CPT | Performed by: INTERNAL MEDICINE

## 2021-11-11 PROCEDURE — 25010000002 EPINEPHRINE PER 0.1 MG: Performed by: INTERNAL MEDICINE

## 2021-11-11 RX ORDER — LIDOCAINE HYDROCHLORIDE 20 MG/ML
INJECTION, SOLUTION EPIDURAL; INFILTRATION; INTRACAUDAL; PERINEURAL AS NEEDED
Status: DISCONTINUED | OUTPATIENT
Start: 2021-11-11 | End: 2021-11-11 | Stop reason: HOSPADM

## 2021-11-11 RX ORDER — PROPOFOL 10 MG/ML
VIAL (ML) INTRAVENOUS AS NEEDED
Status: DISCONTINUED | OUTPATIENT
Start: 2021-11-11 | End: 2021-11-11 | Stop reason: SURG

## 2021-11-11 RX ORDER — LIDOCAINE HYDROCHLORIDE 10 MG/ML
INJECTION, SOLUTION EPIDURAL; INFILTRATION; INTRACAUDAL; PERINEURAL AS NEEDED
Status: DISCONTINUED | OUTPATIENT
Start: 2021-11-11 | End: 2021-11-11 | Stop reason: HOSPADM

## 2021-11-11 RX ORDER — SODIUM CHLORIDE 0.9 % (FLUSH) 0.9 %
10 SYRINGE (ML) INJECTION AS NEEDED
Status: DISCONTINUED | OUTPATIENT
Start: 2021-11-11 | End: 2021-11-11 | Stop reason: HOSPADM

## 2021-11-11 RX ORDER — SODIUM CHLORIDE, SODIUM LACTATE, POTASSIUM CHLORIDE, CALCIUM CHLORIDE 600; 310; 30; 20 MG/100ML; MG/100ML; MG/100ML; MG/100ML
1000 INJECTION, SOLUTION INTRAVENOUS CONTINUOUS
Status: DISCONTINUED | OUTPATIENT
Start: 2021-11-11 | End: 2021-11-11 | Stop reason: HOSPADM

## 2021-11-11 RX ADMIN — SODIUM CHLORIDE, POTASSIUM CHLORIDE, SODIUM LACTATE AND CALCIUM CHLORIDE 1000 ML: 600; 310; 30; 20 INJECTION, SOLUTION INTRAVENOUS at 08:37

## 2021-11-11 RX ADMIN — PROPOFOL 180 MCG/KG/MIN: 10 INJECTION, EMULSION INTRAVENOUS at 11:07

## 2021-11-11 RX ADMIN — PROPOFOL 150 MG: 10 INJECTION, EMULSION INTRAVENOUS at 11:01

## 2021-11-11 NOTE — ANESTHESIA PROCEDURE NOTES
Airway  Date/Time: 11/11/2021 11:04 AM  Airway not difficult    General Information and Staff    Anesthesiologist: Piyush Alexander MD    Indications and Patient Condition    Preoxygenated: yes      Final Airway Details  Final airway type: supraglottic airway      Successful airway: unique  Size 3

## 2021-11-11 NOTE — ANESTHESIA PREPROCEDURE EVALUATION
Anesthesia Evaluation     NPO Solid Status: > 8 hours             Airway   Mallampati: I  Dental      Pulmonary    (-) COPD, sleep apnea, not a smoker    ROS comment: Negative patient screen for JOHN PAUL    Cardiovascular     (-) hypertension      Neuro/Psych  (+) psychiatric history Depression,     GI/Hepatic/Renal/Endo      Musculoskeletal     Abdominal    Substance History      OB/GYN          Other                        Anesthesia Plan    ASA 2     general       Anesthetic plan, all risks, benefits, and alternatives have been provided, discussed and informed consent has been obtained with: patient.

## 2021-11-11 NOTE — ANESTHESIA POSTPROCEDURE EVALUATION
"Patient: Gloria Álvarez    Procedure Summary     Date: 11/11/21 Room / Location:  [facility]    Anesthesia Start: 1056 Anesthesia Stop: 1155    Procedure: BRONCHOSCOPY WITH ENDOBRONCHIAL ULTRASOUND AND NAVIGATION, BRUSHINGS, BAL, BX'S, EPI (1:10,000 IN NS) (N/A Bronchus) Diagnosis:     Surgeons: Clark Richards MD Provider: Piyush Alexander MD    Anesthesia Type: general ASA Status: 2          Anesthesia Type: general    Vitals  Vitals Value Taken Time   /70 11/11/21 1212   Temp     Pulse 75 11/11/21 1212   Resp 16 11/11/21 1212   SpO2 98 % 11/11/21 1212           Post Anesthesia Care and Evaluation    Pain management: adequate  Airway patency: patent  Anesthetic complications: No anesthetic complications    Cardiovascular status: acceptable  Respiratory status: acceptable  Hydration status: acceptable    Comments: /70 (BP Location: Left arm, Patient Position: Lying)   Pulse 75   Resp 16   Ht 165.1 cm (65\")   Wt 71.1 kg (156 lb 12.8 oz)   LMP  (LMP Unknown)   SpO2 98%   BMI 26.09 kg/m²         "

## 2021-11-11 NOTE — NURSING NOTE
Pt updated on delay and was taken to the restroom, resting back in bed peacefully with call light in hand.

## 2021-11-13 LAB
BACTERIA SPEC AEROBE CULT: NORMAL
GRAM STN SPEC: NORMAL
GRAM STN SPEC: NORMAL

## 2021-11-15 LAB
CYTO UR: NORMAL
LAB AP CASE REPORT: NORMAL
LAB AP CLINICAL INFORMATION: NORMAL
LAB AP DIAGNOSIS COMMENT: NORMAL
PATH REPORT.FINAL DX SPEC: NORMAL
PATH REPORT.GROSS SPEC: NORMAL

## 2021-11-16 LAB
CMV DNA SPEC QL NAA+PROBE: NEGATIVE
SPECIMEN SOURCE: NORMAL

## 2021-11-17 ENCOUNTER — OFFICE VISIT (OUTPATIENT)
Dept: OTHER | Facility: CLINIC | Age: 76
End: 2021-11-17

## 2021-11-17 VITALS
BODY MASS INDEX: 25.66 KG/M2 | RESPIRATION RATE: 20 BRPM | OXYGEN SATURATION: 98 % | SYSTOLIC BLOOD PRESSURE: 121 MMHG | WEIGHT: 154 LBS | HEART RATE: 84 BPM | DIASTOLIC BLOOD PRESSURE: 75 MMHG | HEIGHT: 65 IN

## 2021-11-17 DIAGNOSIS — C73 PAPILLARY CARCINOMA OF THYROID (HCC): Primary | ICD-10-CM

## 2021-11-17 PROCEDURE — 99214 OFFICE O/P EST MOD 30 MIN: CPT | Performed by: THORACIC SURGERY (CARDIOTHORACIC VASCULAR SURGERY)

## 2021-11-24 NOTE — TELEPHONE ENCOUNTER
CALLING TO CHECK STATUS OF CT SCAN RESULTS    CALLBACK # [phone]    Labs should result for your drug levels, these may need to be adjusted by your physician, n other medication changes, Mary Simran, DO

## 2021-12-03 ENCOUNTER — OFFICE VISIT (OUTPATIENT)
Dept: FAMILY MEDICINE CLINIC | Facility: CLINIC | Age: 76
End: 2021-12-03

## 2021-12-03 VITALS
HEIGHT: 65 IN | HEART RATE: 76 BPM | RESPIRATION RATE: 16 BRPM | BODY MASS INDEX: 25.79 KG/M2 | OXYGEN SATURATION: 98 % | DIASTOLIC BLOOD PRESSURE: 70 MMHG | WEIGHT: 154.8 LBS | SYSTOLIC BLOOD PRESSURE: 128 MMHG

## 2021-12-03 DIAGNOSIS — G47.01 INSOMNIA DUE TO MEDICAL CONDITION: Primary | ICD-10-CM

## 2021-12-03 PROCEDURE — 99213 OFFICE O/P EST LOW 20 MIN: CPT | Performed by: FAMILY MEDICINE

## 2021-12-03 RX ORDER — TRAZODONE HYDROCHLORIDE 50 MG/1
50 TABLET ORAL NIGHTLY
Qty: 30 TABLET | Refills: 1 | Status: SHIPPED | OUTPATIENT
Start: 2021-12-03 | End: 2022-10-28

## 2021-12-03 NOTE — PROGRESS NOTES
Assessment and Plan     Problem List Items Addressed This Visit        Sleep    Insomnia - Primary    Relevant Medications    traZODone (DESYREL) 50 MG tablet      Patient was given instructions and counseling regarding her condition or for health maintenance advice. Please see specific information pulled into the AVS if appropriate.        Gloria is a 75 y.o. being seen today for  Anxiety   Subjective   History of the Present Illness   She is about to start treatment for her papillary carcinoma recurrent. She is not sleeping well due to that. She knows it will affect her salivary gland. Her  isn't sleeping because she is not sleeping. She is trying to be positive. She is taking melatonin and it's not helpful.   Social History  She  reports that she has never smoked. She has never used smokeless tobacco. She reports that she does not drink alcohol and does not use drugs.  Objective   Vital Signs  BP Readings from Last 1 Encounters:   12/03/21 128/70     Wt Readings from Last 3 Encounters:   12/03/21 70.2 kg (154 lb 12.8 oz)   11/17/21 69.9 kg (154 lb)   11/11/21 71.1 kg (156 lb 12.8 oz)   Body mass index is 25.76 kg/m².     Physical Exam  Vitals reviewed.   Constitutional:       Appearance: Normal appearance. She is well-developed.   Neurological:      Mental Status: She is alert.   Psychiatric:         Behavior: Behavior normal.         Thought Content: Thought content normal.         Judgment: Judgment normal.

## 2021-12-09 LAB — FUNGUS WND CULT: NORMAL

## 2021-12-23 LAB
MYCOBACTERIUM SPEC CULT: NORMAL
NIGHT BLUE STAIN TISS: NORMAL

## 2022-01-14 ENCOUNTER — TELEMEDICINE (OUTPATIENT)
Dept: FAMILY MEDICINE CLINIC | Facility: CLINIC | Age: 77
End: 2022-01-14

## 2022-01-14 DIAGNOSIS — K62.5 BRIGHT RED BLOOD PER RECTUM: Primary | ICD-10-CM

## 2022-01-14 PROCEDURE — 99213 OFFICE O/P EST LOW 20 MIN: CPT | Performed by: FAMILY MEDICINE

## 2022-01-14 RX ORDER — SODIUM FLUORIDE 6 MG/ML
PASTE, DENTIFRICE DENTAL
COMMUNITY
Start: 2021-12-03 | End: 2022-03-21

## 2022-01-14 RX ORDER — PILOCARPINE HYDROCHLORIDE 5 MG/1
TABLET, FILM COATED ORAL
COMMUNITY
Start: 2021-12-20 | End: 2022-03-21

## 2022-01-14 NOTE — PROGRESS NOTES
ASSESSMENT AND PLAN     Problem List Items Addressed This Visit     None      Visit Diagnoses     Bright red blood per rectum - resolved    -  Primary    Reassurance re going forward with thyroid cancer treatment.  We will refer her for C-scope but unlikely to occur anytime soon due to COVID. Miralax next time    Relevant Orders    Ambulatory Referral For Screening Colonoscopy        Patient was given instructions and counseling regarding her condition or for health maintenance advice. Please see specific information pulled into the AVS if appropriate.        Gloria Álvarez is a 76 y.o. female being seen on video today via Video Options: MyChart/Zoom for Hematochezia       Social History  She  reports that she has never smoked. She has never used smokeless tobacco. She reports that she does not drink alcohol and does not use drugs.  History of the Present Illness    She had a little blood in her stool yesterday morning following use of a Dulcolax.  She had a little bit of loose stools with that as well.  Continue to have bowel movements throughout the day with decreasing amounts of blood each time.  It was not a lot of blood to begin with.  She does have hemorrhoids.  She had no pain with a bowel movement.  Her last colonoscopy was July 2017.  She is due for colonoscopy now.  She was concerned that this might indicate that she should hold off on doing her thyroid cancer treatment.  Her stool returned to normal this morning.  Patient had a negative PET scan other than hemorrhoids in that area quite recently.      BP Readings from Last 3 Encounters:   12/03/21 128/70   11/17/21 121/75   11/11/21 120/70      Wt Readings from Last 3 Encounters:   12/03/21 70.2 kg (154 lb 12.8 oz)   11/17/21 69.9 kg (154 lb)   11/11/21 71.1 kg (156 lb 12.8 oz)     There is no height or weight on file to calculate BMI.    OBJECTIVE  Additional parties in room with patient during teleconsult: none  Physical Exam  Vitals reviewed.    Constitutional:       Appearance: Normal appearance. She is well-developed.   Neurological:      Mental Status: She is alert.   Psychiatric:         Behavior: Behavior normal.         Thought Content: Thought content normal.         Judgment: Judgment normal.

## 2022-02-24 ENCOUNTER — OFFICE VISIT (OUTPATIENT)
Dept: GASTROENTEROLOGY | Facility: CLINIC | Age: 77
End: 2022-02-24

## 2022-02-24 ENCOUNTER — TELEPHONE (OUTPATIENT)
Dept: GASTROENTEROLOGY | Facility: CLINIC | Age: 77
End: 2022-02-24

## 2022-02-24 VITALS — WEIGHT: 159.6 LBS | TEMPERATURE: 97.5 F | HEIGHT: 64 IN | BODY MASS INDEX: 27.25 KG/M2

## 2022-02-24 DIAGNOSIS — K62.5 RECTAL BLEEDING: Primary | ICD-10-CM

## 2022-02-24 PROCEDURE — 99203 OFFICE O/P NEW LOW 30 MIN: CPT | Performed by: INTERNAL MEDICINE

## 2022-02-24 NOTE — TELEPHONE ENCOUNTER
CARLY patient via telephone for colonoscopy. Scheduled 03/02/2022 with arrival time 2:00pm. Prep packet posted to [facility] per patient request. Also advised arrival time may change based on [facility] guidelines. CARLY Bond--Bradley

## 2022-02-24 NOTE — PROGRESS NOTES
Chief Complaint   Patient presents with   • Rectal Bleeding     Gloria Álvarez is a 76 y.o. 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  Patient had been doing well with no GI complaints until she developed an episode of rectal bleeding.  Patient denies abdominal pain no nausea vomiting melena.  Patient denies weight loss no fever chills no history of anemia.  Patient here for further recommendations.    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 Dr. Horton is controlling, Disp: , Rfl:   •  PreviDent 5000 Booster Plus 1.1 % paste, , Disp: , Rfl:   •  triamcinolone (KENALOG) 0.1 % cream, APPLY TOPICALLY TO RASH TWICE DAILY AS DIRECTED FOR ONE TO TWO WEEKS, Disp: , Rfl:   •  vitamin E 400 UNIT capsule, Take 400 Units by mouth Daily., Disp: , Rfl:   •  nitrofurantoin, macrocrystal-monohydrate, (MACROBID) 100 MG capsule, Take 1 capsule by mouth 2 (Two) Times a Day., Disp: 14 capsule, Rfl: 0  •  phenazopyridine (PYRIDIUM) 200 MG tablet, Take 1 tablet by mouth 3 (Three) Times a  Day As Needed for Bladder Spasms., Disp: 6 tablet, Rfl: 0  •  pilocarpine (SALAGEN) 5 MG tablet, , Disp: , Rfl:   •  traZODone (DESYREL) 50 MG tablet, Take 1 tablet by mouth Every Night., Disp: 30 tablet, Rfl: 1  Allergies   Allergen Reactions   • Ciprofloxacin Itching     JOINTS ACHY     • Sulfa Antibiotics Itching   • Varicella Virus Vaccine Live Other (See Comments)     FLU LIKE SYMPTOMS, RED AT SITE   • Zoster Vaccine Live Rash and Other (See Comments)     FLU LIKE SYMPTOMS, RED AT SITE       Social History     Socioeconomic History   • Marital status:    Tobacco Use   • Smoking status: Never Smoker   • Smokeless tobacco: Never Used   Vaping Use   • Vaping Use: Never used   Substance and Sexual Activity   • Alcohol use: No   • Drug use: No   • Sexual activity: Defer     Partners: Male     Family History   Problem Relation Age of Onset   • Hypertension Mother    • Hypertension Brother    • Colon polyps Brother         No Cancer   • Malig Hyperthermia Neg Hx      Review of Systems   Constitutional: Negative.    HENT: Negative.    Eyes: Negative.    Respiratory: Negative.    Cardiovascular: Negative.    Gastrointestinal: Positive for blood in stool and constipation. Negative for abdominal distention, abdominal pain, diarrhea, nausea, rectal pain and vomiting.   Endocrine: Negative.    Musculoskeletal: Negative.    Skin: Negative.    Allergic/Immunologic: Negative.    Hematological: Negative.      Vitals:    02/24/22 1216   Temp: 97.5 °F (36.4 °C)     Physical Exam  Vitals and nursing note reviewed.   Constitutional:       Appearance: Normal appearance. She is well-developed and normal weight.   HENT:      Head: Normocephalic and atraumatic.   Eyes:      General: No scleral icterus.     Pupils: Pupils are equal, round, and reactive to light.   Cardiovascular:      Rate and Rhythm: Normal rate and regular rhythm.      Heart sounds: Normal heart sounds. No murmur heard.  No friction rub. No gallop.     Pulmonary:      Effort: Pulmonary effort is normal.      Breath sounds: Normal breath sounds. No wheezing or rales.   Abdominal:      General: Bowel sounds are normal. There is no distension or abdominal bruit.      Palpations: Abdomen is soft. Abdomen is not rigid. There is no shifting dullness, fluid wave, mass or pulsatile mass.      Tenderness: There is no abdominal tenderness. There is no guarding.      Hernia: No hernia is present.   Musculoskeletal:         General: No swelling or tenderness. Normal range of motion.   Skin:     General: Skin is warm and dry.      Coloration: Skin is not jaundiced.      Findings: No rash.   Neurological:      General: No focal deficit present.      Mental Status: She is alert and oriented to person, place, and time.      Cranial Nerves: No cranial nerve deficit.   Psychiatric:         Behavior: Behavior normal.         Thought Content: Thought content normal.       Diagnoses and all orders for this visit:    Rectal bleeding  -     Case Request; Standing  -     Case Request    Patient 76-year-old female with history of papillary carcinoma of the thyroid metastatic to the lung, rectal carcinoid and diverticulosis who presents with single episode of rectal bleeding.  Patient reports never had rectal bleeding before this is new to her.  Last colonoscopy in 2017 significant only for diverticulosis.  Patient reports otherwise doing well no further bleeding but does have a tendency for constipation.  At this point with patient history and new rectal bleeding would recommend repeat colonoscopy as its been 5 years.  Patient does want to confer with her oncologist as is still making a decision on how to treat the metastatic papillary carcinoma of the thyroid.  Once clear with oncology will proceed.

## 2022-03-02 ENCOUNTER — HOSPITAL ENCOUNTER (OUTPATIENT)
Facility: HOSPITAL | Age: 77
Setting detail: HOSPITAL OUTPATIENT SURGERY
Discharge: HOME | End: 2022-03-02
Attending: INTERNAL MEDICINE | Admitting: INTERNAL MEDICINE

## 2022-03-02 ENCOUNTER — ANESTHESIA (OUTPATIENT)
Dept: GASTROENTEROLOGY | Facility: HOSPITAL | Age: 77
End: 2022-03-02

## 2022-03-02 ENCOUNTER — ANESTHESIA EVENT (OUTPATIENT)
Dept: GASTROENTEROLOGY | Facility: HOSPITAL | Age: 77
End: 2022-03-02

## 2022-03-02 VITALS
HEART RATE: 68 BPM | BODY MASS INDEX: 25.99 KG/M2 | RESPIRATION RATE: 16 BRPM | DIASTOLIC BLOOD PRESSURE: 79 MMHG | SYSTOLIC BLOOD PRESSURE: 137 MMHG | WEIGHT: 156 LBS | OXYGEN SATURATION: 99 % | HEIGHT: 65 IN

## 2022-03-02 DIAGNOSIS — K62.5 RECTAL BLEEDING: ICD-10-CM

## 2022-03-02 PROCEDURE — 25010000002 PROPOFOL 10 MG/ML EMULSION: Performed by: ANESTHESIOLOGY

## 2022-03-02 PROCEDURE — 88305 TISSUE EXAM BY PATHOLOGIST: CPT | Performed by: INTERNAL MEDICINE

## 2022-03-02 PROCEDURE — 45390 COLONOSCOPY W/RESECTION: CPT | Performed by: INTERNAL MEDICINE

## 2022-03-02 DEVICE — CLIPPING DEVICE
Type: IMPLANTABLE DEVICE | Site: CECUM | Status: FUNCTIONAL
Brand: RESOLUTION CLIP

## 2022-03-02 RX ORDER — PROPOFOL 10 MG/ML
VIAL (ML) INTRAVENOUS AS NEEDED
Status: DISCONTINUED | OUTPATIENT
Start: 2022-03-02 | End: 2022-03-02 | Stop reason: SURG

## 2022-03-02 RX ORDER — LIDOCAINE HYDROCHLORIDE 20 MG/ML
INJECTION, SOLUTION INFILTRATION; PERINEURAL AS NEEDED
Status: DISCONTINUED | OUTPATIENT
Start: 2022-03-02 | End: 2022-03-02 | Stop reason: SURG

## 2022-03-02 RX ORDER — SODIUM CHLORIDE, SODIUM LACTATE, POTASSIUM CHLORIDE, CALCIUM CHLORIDE 600; 310; 30; 20 MG/100ML; MG/100ML; MG/100ML; MG/100ML
30 INJECTION, SOLUTION INTRAVENOUS CONTINUOUS PRN
Status: DISCONTINUED | OUTPATIENT
Start: 2022-03-02 | End: 2022-03-02 | Stop reason: HOSPADM

## 2022-03-02 RX ADMIN — PROPOFOL 150 MG: 10 INJECTION, EMULSION INTRAVENOUS at 12:29

## 2022-03-02 RX ADMIN — PROPOFOL 140 MCG/KG/MIN: 10 INJECTION, EMULSION INTRAVENOUS at 12:29

## 2022-03-02 RX ADMIN — PROPOFOL 50 MG: 10 INJECTION, EMULSION INTRAVENOUS at 12:58

## 2022-03-02 RX ADMIN — LIDOCAINE HYDROCHLORIDE 60 MG: 20 INJECTION, SOLUTION INFILTRATION; PERINEURAL at 12:29

## 2022-03-02 RX ADMIN — SODIUM CHLORIDE, POTASSIUM CHLORIDE, SODIUM LACTATE AND CALCIUM CHLORIDE 30 ML/HR: 600; 310; 30; 20 INJECTION, SOLUTION INTRAVENOUS at 12:19

## 2022-03-02 NOTE — ANESTHESIA POSTPROCEDURE EVALUATION
"Patient: Gloria Álvarez    Procedure Summary     Date: 03/02/22 Room / Location:  [facility]    Anesthesia Start: 1226 Anesthesia Stop: 1307    Procedure: COLONOSCOPY to cecum with 5mL saline lift injections hot snare polypectomy with clip placement x2 (N/A ) Diagnosis:       Rectal bleeding      Colon polyp      Diverticulosis      (Rectal bleeding [K62.5])    Surgeons: Andres Lambert MD Provider: Piyush Alexander MD    Anesthesia Type: MAC ASA Status: 2          Anesthesia Type: MAC    Vitals  Vitals Value Taken Time   /65 03/02/22 1309   Temp     Pulse 74 03/02/22 1309   Resp 14 03/02/22 1309   SpO2 100 % 03/02/22 1309           Post Anesthesia Care and Evaluation    Patient location during evaluation: bedside  Patient participation: complete - patient participated  Level of consciousness: awake and alert  Pain management: adequate  Airway patency: patent  Anesthetic complications: No anesthetic complications    Cardiovascular status: acceptable  Respiratory status: acceptable  Hydration status: acceptable    Comments: /65 (BP Location: Left arm, Patient Position: Lying)   Pulse 74   Resp 14   Ht 165.1 cm (65\")   Wt 70.8 kg (156 lb)   LMP  (LMP Unknown)   SpO2 100%   BMI 25.96 kg/m²         "

## 2022-03-02 NOTE — ANESTHESIA PREPROCEDURE EVALUATION
Anesthesia Evaluation                  Airway   Mallampati: II  Dental      Pulmonary    (-) asthma, sleep apnea, not a smoker    ROS comment: Negative patient screen for JOHN PAUL    Cardiovascular     (-) hypertension      Neuro/Psych  (+) psychiatric history Depression,    GI/Hepatic/Renal/Endo    (+)   thyroid problem hypothyroidism    Musculoskeletal     Abdominal    Substance History      OB/GYN          Other                        Anesthesia Plan    ASA 2     MAC       Anesthetic plan, all risks, benefits, and alternatives have been provided, discussed and informed consent has been obtained with: patient.        CODE STATUS:

## 2022-03-02 NOTE — BRIEF OP NOTE
COLONOSCOPY  Progress Note    Gloria Álvarez  3/2/2022    Pre-op Diagnosis:   Rectal bleeding [K62.5]       Post-Op Diagnosis Codes:     * Rectal bleeding [K62.5]     * Colon polyp [K63.5]     * Diverticulosis [K57.90]    Procedure/CPT® Codes:        Procedure(s):  COLONOSCOPY to cecum with 5mL saline lift injections hot snare polypectomy    Surgeon(s):  Andres Lambert MD    Anesthesia: Monitored Anesthesia Care    Staff:   Endo Technician: Elaine Fraser  Endo Nurse: Brenda Garnica RN         Estimated Blood Loss: none    Urine Voided: * No values recorded between 3/2/2022 12:25 PM and 3/2/2022  1:05 PM *    Specimens:                Specimens     ID Source Type Tests Collected By Collected At Frozen?    A Large Intestine, Cecum Polyp · TISSUE PATHOLOGY EXAM   Andres Lambert MD 3/2/22 1250     Description: polyp bx     Comment: Hot snare                Drains: * No LDAs found *    Findings: Colonoscopy to the terminal ileum with normal ileum mucosa.  Cecal polyp identified removed endoscopic mucosal resection with saline lift snare cautery.  Sigmoid diverticulosis was noted a surgical anastomosis in the rectum was also identified remainder the colonic mucosa was normal.    Complications: None          Andres Lambert MD     Date: 3/2/2022  Time: 13:06 EST

## 2022-03-02 NOTE — ADDENDUM NOTE
Addendum  created 03/02/22 1654 by Johnny Boggs MD    Review and Sign - Ready for Procedure, Review and Sign - Signed

## 2022-03-03 LAB
LAB AP CASE REPORT: NORMAL
LAB AP CLINICAL INFORMATION: NORMAL
PATH REPORT.FINAL DX SPEC: NORMAL
PATH REPORT.GROSS SPEC: NORMAL

## 2022-03-14 ENCOUNTER — TELEPHONE (OUTPATIENT)
Dept: GASTROENTEROLOGY | Facility: CLINIC | Age: 77
End: 2022-03-14

## 2022-03-14 NOTE — TELEPHONE ENCOUNTER
----- Message from Andres Lambert MD sent at 3/14/2022 10:34 AM EDT -----  Polyp benign repeat: 5 years as discussed  
Hm and recall placed for 3/2/27    Patient notified of results and recommendations and verbalized understanding    
<<-----Click here for Discharge Medication Review

## 2022-03-15 ENCOUNTER — APPOINTMENT (OUTPATIENT)
Dept: WOMENS IMAGING | Facility: HOSPITAL | Age: 77
End: 2022-03-15

## 2022-03-15 PROCEDURE — 77067 SCR MAMMO BI INCL CAD: CPT | Performed by: RADIOLOGY

## 2022-03-15 PROCEDURE — 77063 BREAST TOMOSYNTHESIS BI: CPT | Performed by: RADIOLOGY

## 2022-03-21 ENCOUNTER — OFFICE VISIT (OUTPATIENT)
Dept: FAMILY MEDICINE CLINIC | Facility: CLINIC | Age: 77
End: 2022-03-21

## 2022-03-21 VITALS
HEIGHT: 65 IN | DIASTOLIC BLOOD PRESSURE: 82 MMHG | HEART RATE: 77 BPM | RESPIRATION RATE: 14 BRPM | OXYGEN SATURATION: 98 % | BODY MASS INDEX: 25.96 KG/M2 | SYSTOLIC BLOOD PRESSURE: 144 MMHG

## 2022-03-21 DIAGNOSIS — M79.601 MUSCULOSKELETAL PAIN OF RIGHT UPPER EXTREMITY: Primary | ICD-10-CM

## 2022-03-21 PROCEDURE — 99213 OFFICE O/P EST LOW 20 MIN: CPT | Performed by: NURSE PRACTITIONER

## 2022-03-21 RX ORDER — BACLOFEN 10 MG/1
10 TABLET ORAL 3 TIMES DAILY
Qty: 30 TABLET | Refills: 0 | Status: SHIPPED | OUTPATIENT
Start: 2022-03-21 | End: 2022-09-01

## 2022-03-21 NOTE — PROGRESS NOTES
Subjective   Gloria Álvarez is a 76 y.o. female.   right upper arm pain    History of Present Illness   Right arm pain that has been going on for a while, about a week.She denies any injury.Took some ibuprofen which helped a little. She still has full range of motion  She is currently being followed by [facility] for Papillary carcinoma of thyroid.It is now in her lungs.    The following portions of the patient's history were reviewed and updated as appropriate: allergies, current medications, past family history, past medical history, past social history, past surgical history and problem list.    Review of Systems   Constitutional: Negative for activity change and appetite change.   Gastrointestinal: Negative for diarrhea and vomiting.   Musculoskeletal: Positive for myalgias.   Neurological: Negative for weakness.       Objective   Physical Exam  Vitals and nursing note reviewed.   Constitutional:       Appearance: Normal appearance.   HENT:      Head: Normocephalic and atraumatic.   Pulmonary:      Effort: Pulmonary effort is normal.   Musculoskeletal:         General: Tenderness present. Normal range of motion.      Comments: Tenderness to her right bicept   Neurological:      Mental Status: She is alert.           Assessment/Plan   Problem List Items Addressed This Visit    None     Visit Diagnoses     Musculoskeletal pain of right upper extremity    -  Primary        Script for baclofen and then follow up with specialists.         Return if symptoms worsen or fail to improve.

## 2022-03-25 ENCOUNTER — TELEPHONE (OUTPATIENT)
Dept: FAMILY MEDICINE CLINIC | Facility: CLINIC | Age: 77
End: 2022-03-25

## 2022-03-25 NOTE — TELEPHONE ENCOUNTER
Caller: Gloria Álvarez    Relationship: Self    Best call back number: [phone]    What was the call regarding: PATIENT STATED THAT SHE IS STILL EXPERIENCING PROBLEMS WITH HER RIGHT ARM SINCE HER APPOINTMENT WITH [facility] ON 03/21/2022. PATIENT WANTED TO KNOW WHAT SHE SUGGESTS THAT SHE SHOULD DO. PLEASE CALL PATIENT TO ADVISE.     PATIENT STATED THAT IF SHE DOES NOT ANSWER, TO PLEASE LEAVE A MESSAGE ON THE ANSWERING MACHINE.     Do you require a callback: YES

## 2022-03-28 DIAGNOSIS — M79.601 PAIN OF RIGHT UPPER EXTREMITY: Primary | ICD-10-CM

## 2022-03-31 ENCOUNTER — OFFICE VISIT (OUTPATIENT)
Dept: ORTHOPEDIC SURGERY | Facility: CLINIC | Age: 77
End: 2022-03-31

## 2022-03-31 VITALS — TEMPERATURE: 98.1 F | WEIGHT: 157 LBS | HEIGHT: 64 IN | BODY MASS INDEX: 26.8 KG/M2

## 2022-03-31 DIAGNOSIS — M81.0 AGE-RELATED OSTEOPOROSIS WITHOUT CURRENT PATHOLOGICAL FRACTURE: ICD-10-CM

## 2022-03-31 DIAGNOSIS — M79.601 ARM PAIN, RIGHT: Primary | ICD-10-CM

## 2022-03-31 DIAGNOSIS — M89.8X2 PAIN IN HUMERUS: ICD-10-CM

## 2022-03-31 PROCEDURE — 73060 X-RAY EXAM OF HUMERUS: CPT | Performed by: NURSE PRACTITIONER

## 2022-03-31 PROCEDURE — 99213 OFFICE O/P EST LOW 20 MIN: CPT | Performed by: NURSE PRACTITIONER

## 2022-03-31 NOTE — PROGRESS NOTES
Patient Name: Gloria Álvarez   YOB: 1945  Referring Primary Care Physician: Jess Holden MD  BMI: Body mass index is 26.95 kg/m².    Chief Complaint:    Chief Complaint   Patient presents with   • Right Upper Arm - Pain        HPI: New pt to me - hx of papillary thyroid cancer and hx of osteopenia that presents with right humerus pain that started 2 weeks ago. Pt saw her PCP and uses tylenol and heat and helps some. Pt denies fall, injury or trauma. She has no hx of cervical     Gloria Álvarez is a 76 y.o. female who presents today for evaluation of   Chief Complaint   Patient presents with   • Right Upper Arm - Pain         Subjective   Medications:   Home Medications:  Current Outpatient Medications on File Prior to Visit   Medication Sig   • Aspirin Buf,CaCarb-MgCarb-MgO, 81 MG tablet Take 81 mg by mouth Daily.   • Calcium Carbonate-Vit D-Min (CALTRATE 600+D PLUS MINERALS) 600-800 MG-UNIT chewable tablet Chew 1 tablet Daily.   • Cholecalciferol (VITAMIN D3) 1000 units capsule Take  by mouth.   • levothyroxine (SYNTHROID, LEVOTHROID) 137 MCG tablet Take 137 mcg by mouth Daily. Dr. Horton is controlling   • traZODone (DESYREL) 50 MG tablet Take 1 tablet by mouth Every Night.   • triamcinolone (KENALOG) 0.1 % cream APPLY TOPICALLY TO RASH TWICE DAILY AS DIRECTED FOR ONE TO TWO WEEKS   • vitamin E 400 UNIT capsule Take 400 Units by mouth Daily.   • baclofen (LIORESAL) 10 MG tablet Take 1 tablet by mouth 3 (Three) Times a Day.     No current facility-administered medications on file prior to visit.     Current Medications:  Scheduled Meds:  Continuous Infusions:No current facility-administered medications for this visit.    PRN Meds:.    I have reviewed the patient's medical history in detail and updated the computerized patient record.  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   • Osteoporosis    • Papillary carcinoma of thyroid (HCC) 2012   • Personal history of asthma     CHILDHOOD        Past Surgical History:   Procedure Laterality Date   • BRONCHOSCOPY N/A 11/11/2021    Procedure: BRONCHOSCOPY WITH ENDOBRONCHIAL ULTRASOUND AND NAVIGATION, BRUSHINGS, BAL, BX'S, EPI (1:10,000 IN NS);  Surgeon: Clark Richards MD;  Location: [facility];  Service: Pulmonary;  Laterality: N/A;  pre/post-lung nodule    • CATARACT EXTRACTION Bilateral    • CHOLECYSTECTOMY  2003   • COLONOSCOPY  04/2012    complete   • COLONOSCOPY N/A 7/5/2017    Procedure: COLONOSCOPY TO CECUM;  Surgeon: Andres Lambert MD;  Location: [facility];  Service:    • COLONOSCOPY N/A 3/2/2022    Procedure: COLONOSCOPY to cecum with 5mL saline lift injections hot snare polypectomy with clip placement x2;  Surgeon: Andres Lambert MD;  Location: [facility];  Service: Gastroenterology;  Laterality: N/A;  pre- hx rectal bleeding   post- diverticulosis, polyp, HEMORRHOIDS   • FLEXIBLE SIGMOIDOSCOPY  July 27, 2006    biopsy clear of all carcinoid   • JOINT REPLACEMENT     • RECTAL TUMOR BY PROCTOTOMY EXCISION      CARCINOID   • REPLACEMENT TOTAL HIP SUPINE POSITION     • TOTAL HIP ARTHROPLASTY Left 12/27/2017    Procedure: TOTAL HIP ARTHROPLASTY ANTERIOR WITH HANA TABLE;  Surgeon: Oral Ford MD;  Location: [facility] OR;  Service:    • TOTAL THYROIDECTOMY      7/3/12 ESTEFANIA / DR DEE/ 3CM PAP MET 1/9 NODULE RIGHT LOBE / LEFT LOBE -        Social History     Occupational History   • Not on file   Tobacco Use   • Smoking status: Never Smoker   • Smokeless tobacco: Never Used   Vaping Use   • Vaping Use: Never used   Substance and Sexual Activity   • Alcohol use: No   • Drug use: No   • Sexual activity: Defer     Partners: Male      Social  "History     Social History Narrative   • Not on file        Family History   Problem Relation Age of Onset   • Hypertension Mother    • Hypertension Brother    • Colon polyps Brother         No Cancer   • Malig Hyperthermia Neg Hx        ROS: 14 point review of systems was performed and all other systems were reviewed and are negative except for documented findings in HPI and today's encounter.     Allergies:   Allergies   Allergen Reactions   • Ciprofloxacin Itching     JOINTS ACHY     • Sulfa Antibiotics Itching   • Varicella Virus Vaccine Live Other (See Comments)     FLU LIKE SYMPTOMS, RED AT SITE   • Zoster Vaccine Live Rash and Other (See Comments)     FLU LIKE SYMPTOMS, RED AT SITE       Constitutional:  Denies fever, shaking or chills   Eyes:  Denies change in visual acuity   HENT:  Denies nasal congestion or sore throat   Respiratory:  Denies cough or shortness of breath   Cardiovascular:  Denies chest pain or severe LE edema   GI:  Denies abdominal pain, nausea, vomiting, bloody stools or diarrhea   Musculoskeletal:  Numbness, tingling, pain, or loss of motor function only as noted above in history of present illness.  : Denies painful urination or hematuria  Integument:  Denies rash, lesion or ulceration   Neurologic:  Denies headache or focal weakness  Endocrine:  Denies lymphadenopathy  Psych:  Denies confusion or change in mental status   Hem:  Denies active bleeding    OBJECTIVE:  Physical Exam: 76 y.o. female  Wt Readings from Last 3 Encounters:   03/31/22 71.2 kg (157 lb)   03/02/22 70.8 kg (156 lb)   02/24/22 72.4 kg (159 lb 9.6 oz)     Ht Readings from Last 1 Encounters:   03/31/22 162.6 cm (64\")     Body mass index is 26.95 kg/m².  Vitals:    03/31/22 1530   Temp: 98.1 °F (36.7 °C)     Vital signs reviewed.     General Appearance:    Alert, cooperative, in no acute distress                  Eyes: conjunctiva clear  ENT: external ears and nose atraumatic  CV: no peripheral edema  Resp: normal " respiratory effort  Skin: no rashes or wounds; normal turgor  Psych: mood and affect appropriate  Lymph: no nodes appreciated  Neuro: gross sensation intact  Vascular:  Palpable peripheral pulse in noted extremity  Musculoskeletal Extremities: skin warm, dry and intact, point tender mid aspect of right humerus - shoulder moves freely without pain.     Radiology: right humerus 2 views done for pain and no comparison views no acute fracture        Assessment:     ICD-10-CM ICD-9-CM   1. Arm pain, right  M79.601 729.5   2. Age-related osteoporosis without current pathological fracture  M81.0 733.01   3. Pain in humerus  M89.8X2 733.90        MDM/Plan:   The diagnosis(es), natural history, pathophysiology and treatment for diagnosis(es) were discussed. Opportunity given and questions answered.  Biomechanics of pertinent body areas discussed.  When appropriate, the use of ambulatory aids discussed.    The diagnosis(es), natural history, pathophysiology and treatment for diagnosis(es) were discussed. Opportunity given and questions answered.  Biomechanics of pertinent body areas discussed.  When appropriate, the use of ambulatory aids discussed.  MEDICATIONS:  The risks, benefits, warnings,side effects and alternatives of medications discussed.  Inflammation/pain control; with cold, heat, elevation and/or liniments discussed as appropriate  MRI.      3/31/2022    Dictated utilizing Dragon dictation      Answers for HPI/ROS submitted by the patient on 3/29/2022  Please describe your symptoms.: Pain in upper right arm muscle beginning at back of arm and into muscle in the front of arm.  Have you had these symptoms before?: No  How long have you been having these symptoms?: 1-2 weeks  Please list any medications you are currently taking for this condition.: I was prescribed Baclofen 10MG, muscle relaxer, by AMADEO Rojas on 3-21-22.  Lisbeth is with  [facility].    I only took it a few days  because this medicine made me feel shaky.  Please describe any probable cause for these symptoms. : I cannot specifically say the cause other than overextending the arm, or possibly sleeping on my right side too long.  What is the primary reason for your visit?: Other

## 2022-04-01 ENCOUNTER — HOSPITAL ENCOUNTER (OUTPATIENT)
Dept: MRI IMAGING | Facility: HOSPITAL | Age: 77
Discharge: HOME | End: 2022-04-01
Admitting: NURSE PRACTITIONER

## 2022-04-01 DIAGNOSIS — M81.0 AGE-RELATED OSTEOPOROSIS WITHOUT CURRENT PATHOLOGICAL FRACTURE: ICD-10-CM

## 2022-04-01 DIAGNOSIS — M89.8X2 PAIN IN HUMERUS: ICD-10-CM

## 2022-04-01 PROCEDURE — 73218 MRI UPPER EXTREMITY W/O DYE: CPT

## 2022-04-04 ENCOUNTER — TELEPHONE (OUTPATIENT)
Dept: ORTHOPEDIC SURGERY | Facility: CLINIC | Age: 77
End: 2022-04-04

## 2022-04-04 DIAGNOSIS — M89.8X2 PAIN IN HUMERUS: ICD-10-CM

## 2022-04-04 DIAGNOSIS — M79.601 ARM PAIN, RIGHT: Primary | ICD-10-CM

## 2022-04-04 NOTE — TELEPHONE ENCOUNTER
----- Message from AMADEO Serrato sent at 4/4/2022 11:43 AM EDT -----  No mass or fracture - plz follow with sports med - pain coming from shoulder may benefit from injection and therapy  ----- Message -----  From: Gary Rad Results Elem In  Sent: 4/2/2022   1:31 PM EDT  To: AMADEO Serrato

## 2022-04-05 LAB
LAB AP CASE REPORT: NORMAL
LAB AP DIAGNOSIS COMMENT: NORMAL
LAB AP INTRADEPARTMENTAL CONSULT: NORMAL
Lab: NORMAL
Lab: NORMAL
PATH REPORT.ADDENDUM SPEC: NORMAL
PATH REPORT.FINAL DX SPEC: NORMAL
PATH REPORT.GROSS SPEC: NORMAL

## 2022-06-21 ENCOUNTER — OFFICE VISIT (OUTPATIENT)
Dept: ORTHOPEDIC SURGERY | Facility: CLINIC | Age: 77
End: 2022-06-21

## 2022-06-21 VITALS — WEIGHT: 160.8 LBS | HEIGHT: 65 IN | TEMPERATURE: 97.1 F | BODY MASS INDEX: 26.79 KG/M2

## 2022-06-21 DIAGNOSIS — M25.561 RIGHT KNEE PAIN, UNSPECIFIED CHRONICITY: Primary | ICD-10-CM

## 2022-06-21 PROCEDURE — 99213 OFFICE O/P EST LOW 20 MIN: CPT | Performed by: NURSE PRACTITIONER

## 2022-06-21 PROCEDURE — 20610 DRAIN/INJ JOINT/BURSA W/O US: CPT | Performed by: NURSE PRACTITIONER

## 2022-06-21 PROCEDURE — 73562 X-RAY EXAM OF KNEE 3: CPT | Performed by: NURSE PRACTITIONER

## 2022-06-21 RX ORDER — METHYLPREDNISOLONE ACETATE 80 MG/ML
80 INJECTION, SUSPENSION INTRA-ARTICULAR; INTRALESIONAL; INTRAMUSCULAR; SOFT TISSUE
Status: COMPLETED | OUTPATIENT
Start: 2022-06-21 | End: 2022-06-21

## 2022-06-21 RX ADMIN — METHYLPREDNISOLONE ACETATE 80 MG: 80 INJECTION, SUSPENSION INTRA-ARTICULAR; INTRALESIONAL; INTRAMUSCULAR; SOFT TISSUE at 10:59

## 2022-06-21 NOTE — PROGRESS NOTES
Patient: Gloria Álvarez  YOB: 1945 76 y.o. female  Medical Record Number: [id number]    Chief Complaints:   Chief Complaint   Patient presents with   • Right Knee - Follow-up       History of Present Illness:Gloria Álvarez is a 76 y.o. female who presents with complaints of right knee pain.  Apparently 6 days ago she was doing some side lunges and squats, later that evening the right knee became very sore and it progressively got worse.  She went to the [facility] care center the next day and they did x-rays, offered a steroid but she wanted to hold off until she was seen here.  Prior to the injury she has had some pain off and on in that right knee but it has definitely gotten worse    Allergies:   Allergies   Allergen Reactions   • Ciprofloxacin Itching     JOINTS ACHY     • Sulfa Antibiotics Itching   • Varicella Virus Vaccine Live Other (See Comments)     FLU LIKE SYMPTOMS, RED AT SITE   • Zoster Vaccine Live Rash and Other (See Comments)     FLU LIKE SYMPTOMS, RED AT SITE         Medications:   Current Outpatient Medications   Medication Sig Dispense Refill   • Aspirin Buf,CaCarb-MgCarb-MgO, 81 MG tablet Take 81 mg by mouth Daily.     • Calcium Carbonate-Vit D-Min (CALTRATE 600+D PLUS MINERALS) 600-800 MG-UNIT chewable tablet Chew 1 tablet Daily.     • Cholecalciferol (VITAMIN D3) 1000 units capsule Take  by mouth.     • levothyroxine (SYNTHROID, LEVOTHROID) 137 MCG tablet Take 137 mcg by mouth Daily. Dr. Horton is controlling     • traZODone (DESYREL) 50 MG tablet Take 1 tablet by mouth Every Night. 30 tablet 1   • triamcinolone (KENALOG) 0.1 % cream APPLY TOPICALLY TO RASH TWICE DAILY AS DIRECTED FOR ONE TO TWO WEEKS     • vitamin E 400 UNIT capsule Take 400 Units by mouth Daily.     • baclofen (LIORESAL) 10 MG tablet Take 1 tablet by mouth 3 (Three) Times a Day. 30 tablet 0     No current facility-administered medications for this visit.         The following portions of the patient's  "history were reviewed and updated as appropriate: allergies, current medications, past family history, past medical history, past social history, past surgical history and problem list.    Review of Systems:   A 14 point review of systems was performed. All systems negative except pertinent positives/negative listed in HPI above    Physical Exam:   Vitals:    06/21/22 1036   Temp: 97.1 °F (36.2 °C)   TempSrc: Temporal   Weight: 72.9 kg (160 lb 12.8 oz)   Height: 165.1 cm (65\")       General: A and O x 3, ASA, NAD    SCLERA:    Normal    Skin clear no unusual lesions noted  Right knee patient has trace amount of effusion noted with 116 degrees flexion neutral in extension with some mild patellofemoral crepitus noted.  Negative Lachman negative Mary calf is soft and nontender       Radiology:  Xrays 3views (ap,lateral, sunrise) right knee were ordered and reviewed today secondary to pain and show patellofemoral arthritic changes noted bilaterally.  No compared to views available    Assessment/Plan: Osteoarthritis right knee with increasing pain following injury    Patient discussed options, we will proceed with right knee cortisone injection, Pennsaid gel twice a day, ice, elevation, Tylenol as needed, I will see her back as needed    Large Joint Arthrocentesis: R knee  Date/Time: 6/21/2022 10:59 AM  Consent given by: patient  Site marked: site marked  Timeout: Immediately prior to procedure a time out was called to verify the correct patient, procedure, equipment, support staff and site/side marked as required   Supporting Documentation  Indications: pain and joint swelling   Procedure Details  Location: knee - R knee  Preparation: Patient was prepped and draped in the usual sterile fashion  Needle size: 22 G  Approach: anterolateral  Medications administered: 80 mg methylPREDNISolone acetate 80 MG/ML; 2 mL lidocaine (cardiac)  Patient tolerance: patient tolerated the procedure well with no immediate " complications            Madie Lee, APRN  6/21/2022  Answers for HPI/ROS submitted by the patient on 6/18/2022  What is the primary reason for your visit?: Lower Extremity Injury  Incident occurred: 3 to 5 days ago  Incident location: at home  Injury mechanism: other  Pain location: right knee  Pain quality: aching  Pain - numeric: 6/10  Pain course: improving  tingling: No  inability to bear weight: No  loss of motion: Yes  loss of sensation: No  muscle weakness: Yes  Foreign body present: no foreign bodies  Aggravated by: movement, weight bearing

## 2022-07-11 ENCOUNTER — TELEPHONE (OUTPATIENT)
Dept: ORTHOPEDIC SURGERY | Facility: CLINIC | Age: 77
End: 2022-07-11

## 2022-07-11 NOTE — TELEPHONE ENCOUNTER
Provider: KIMBERLYN KEARNS  Caller: SADNY DAMIAN  Relationship to Patient: SELF  Phone Number: [phone]  Reason for Call: PATIENT CALLED, NEEDING TO SCHEDULE 4 WEEK FOLLOW UP (AROUND 08/08/22). PATIENT WILL BE STARTING PHYSICAL THERAPY WITH [facility] (PER TE FROM 07/06/22)  DUE TO AVAILABILITY, UNABLE TO SCHEDULE PATIENT WITHIN AN APPROPRIATE TIMEFRAME. REQUESTING A CALL BACK FOR SCHEDULING, PLEASE ADVISE.

## 2022-08-09 ENCOUNTER — OFFICE VISIT (OUTPATIENT)
Dept: ORTHOPEDIC SURGERY | Facility: CLINIC | Age: 77
End: 2022-08-09

## 2022-08-09 VITALS — HEIGHT: 65 IN | TEMPERATURE: 96.4 F | BODY MASS INDEX: 26.76 KG/M2 | WEIGHT: 160.6 LBS

## 2022-08-09 DIAGNOSIS — M25.561 RIGHT KNEE PAIN, UNSPECIFIED CHRONICITY: Primary | ICD-10-CM

## 2022-08-09 PROCEDURE — 99213 OFFICE O/P EST LOW 20 MIN: CPT | Performed by: NURSE PRACTITIONER

## 2022-08-09 NOTE — PROGRESS NOTES
Patient: Gloria Álvarez  YOB: 1945 76 y.o. female  Medical Record Number: [id number]    Chief Complaints:   Chief Complaint   Patient presents with   • Right Knee - Follow-up       History of Present Illness:Gloria Álvarez is a 76 y.o. female who presents for follow-up on right knee pain secondary to osteoarthritis.  She is finished with physical therapy, overall feeling much better although she does have some occasional mild knee pain it is much improved.    Allergies:   Allergies   Allergen Reactions   • Ciprofloxacin Itching     JOINTS ACHY     • Sulfa Antibiotics Itching   • Varicella Virus Vaccine Live Other (See Comments)     FLU LIKE SYMPTOMS, RED AT SITE   • Zoster Vaccine Live Rash and Other (See Comments)     FLU LIKE SYMPTOMS, RED AT SITE         Medications:   Current Outpatient Medications   Medication Sig Dispense Refill   • Aspirin Buf,CaCarb-MgCarb-MgO, 81 MG tablet Take 81 mg by mouth Daily.     • Calcium Carbonate-Vit D-Min (CALTRATE 600+D PLUS MINERALS) 600-800 MG-UNIT chewable tablet Chew 1 tablet Daily.     • Cholecalciferol (VITAMIN D3) 1000 units capsule Take  by mouth.     • levothyroxine (SYNTHROID, LEVOTHROID) 137 MCG tablet Take 137 mcg by mouth Daily. Dr. Horton is controlling     • traZODone (DESYREL) 50 MG tablet Take 1 tablet by mouth Every Night. 30 tablet 1   • triamcinolone (KENALOG) 0.1 % cream APPLY TOPICALLY TO RASH TWICE DAILY AS DIRECTED FOR ONE TO TWO WEEKS     • vitamin E 400 UNIT capsule Take 400 Units by mouth Daily.     • baclofen (LIORESAL) 10 MG tablet Take 1 tablet by mouth 3 (Three) Times a Day. 30 tablet 0     No current facility-administered medications for this visit.         The following portions of the patient's history were reviewed and updated as appropriate: allergies, current medications, past family history, past medical history, past social history, past surgical history and problem list.    Review of Systems:   A 14 point review of  "systems was performed. All systems negative except pertinent positives/negative listed in HPI above    Physical Exam:   Vitals:    08/09/22 1316   Temp: 96.4 °F (35.8 °C)   TempSrc: Temporal   Weight: 72.8 kg (160 lb 9.6 oz)   Height: 165.1 cm (65\")       General: A and O x 3, ASA, NAD    SCLERA:    Normal    Skin clear no unusual lesions noted  Right knee patient has no appreciable effusion 120 degrees flexion neutral and extension with some patellofemoral crepitus noted.  Calf soft and nontender       Radiology:  Xrays 3views (ap,lateral, sunrise) previous x-rays of the right knee were reviewed and show patellofemoral arthritic changes    Assessment/Plan: Patellofemoral osteoarthritis right knee    Patient and I discussed options, she will definitely continue with physical therapy exercises, Tylenol as needed, and I will be happy to see her back as needed      Madie Lee, APRN  8/9/2022  "

## 2022-09-01 ENCOUNTER — OFFICE VISIT (OUTPATIENT)
Dept: FAMILY MEDICINE CLINIC | Facility: CLINIC | Age: 77
End: 2022-09-01

## 2022-09-01 VITALS
BODY MASS INDEX: 26.49 KG/M2 | HEIGHT: 65 IN | HEART RATE: 84 BPM | WEIGHT: 159 LBS | SYSTOLIC BLOOD PRESSURE: 140 MMHG | DIASTOLIC BLOOD PRESSURE: 82 MMHG | OXYGEN SATURATION: 97 %

## 2022-09-01 DIAGNOSIS — R94.31 ABNORMAL ECG: ICD-10-CM

## 2022-09-01 DIAGNOSIS — M54.6 ACUTE MIDLINE THORACIC BACK PAIN: Primary | ICD-10-CM

## 2022-09-01 PROCEDURE — 99214 OFFICE O/P EST MOD 30 MIN: CPT | Performed by: NURSE PRACTITIONER

## 2022-09-01 PROCEDURE — 93000 ELECTROCARDIOGRAM COMPLETE: CPT | Performed by: NURSE PRACTITIONER

## 2022-09-01 NOTE — PROGRESS NOTES
Subjective   Gloria Álvarez is a 76 y.o. female.     History of Present Illness   Patient presents with c/o intermittent middle upper back pain, ongoing for about 1 week.  She reports that it feels like when you exercise and has a soreness or burning. She reports that she feels like she needs to stretch. She reports that when she has the pain, if she takes tylenol it does help. She has been doing right arm exercises and is wondering if this has any thing to do with it. She denies any cardiac symptoms such as shortness of breath, dizziness, palpitations, chest pain or headache.     Patient is also being seen for c/o GERD, ongoing for 2-3 months.  She reports that her symptoms are intermittent. She reports that she TUMs does help. She states that if she eats chocolate, it helps her reflux.     The following portions of the patient's history were reviewed and updated as appropriate: allergies, current medications, past family history, past medical history, past social history, past surgical history and problem list.    Review of Systems   Constitutional: Negative for chills, fatigue and fever.   Respiratory: Negative for cough, chest tightness, shortness of breath and wheezing.    Cardiovascular: Negative for chest pain, palpitations and leg swelling.   Gastrointestinal: Positive for GERD.   Musculoskeletal: Positive for back pain (mid thoracic). Negative for arthralgias, gait problem, joint swelling, myalgias, neck pain and neck stiffness.   Skin: Negative for dry skin.   Neurological: Negative for dizziness, weakness and headache.   Psychiatric/Behavioral: Negative.        Objective   Physical Exam  Vitals and nursing note reviewed.   Constitutional:       Appearance: Normal appearance. She is well-developed and normal weight.   HENT:      Head: Normocephalic and atraumatic.   Eyes:      Conjunctiva/sclera: Conjunctivae normal.      Pupils: Pupils are equal, round, and reactive to light.   Neck:      Thyroid: No  thyromegaly.   Cardiovascular:      Rate and Rhythm: Normal rate and regular rhythm.      Heart sounds: Normal heart sounds. No murmur heard.  Pulmonary:      Effort: Pulmonary effort is normal.      Breath sounds: Normal breath sounds.   Musculoskeletal:         General: No deformity. Normal range of motion.        Arms:       Cervical back: Normal, full passive range of motion without pain, normal range of motion and neck supple.      Thoracic back: Tenderness present.      Lumbar back: Normal.   Lymphadenopathy:      Cervical: No cervical adenopathy.   Skin:     General: Skin is warm and dry.   Neurological:      Mental Status: She is alert and oriented to person, place, and time.   Psychiatric:         Behavior: Behavior normal.         Thought Content: Thought content normal.         Judgment: Judgment normal.         Vitals:    09/01/22 1023   BP: 140/82   Pulse: 84   SpO2: 97%     Body mass index is 26.46 kg/m².      ECG 12 Lead    Date/Time: 9/1/2022 11:09 AM  Performed by: Maria L Aviles APRN  Authorized by: Maria L Aviles APRN   Comparison: compared with previous ECG from 12/26/2017  Similar to previous ECG  Comparison to previous ECG: Mild changes to EKG compared to today's  Rhythm: sinus rhythm  Other findings: T wave abnormality    Clinical impression: abnormal EKG            Assessment & Plan   Problems Addressed this Visit    None     Visit Diagnoses     Acute midline thoracic back pain    -  Primary    Relevant Orders    ECG 12 Lead (Completed)    Ambulatory Referral to Cardiology    Comprehensive Metabolic Panel    Magnesium    Abnormal ECG        Relevant Orders    Ambulatory Referral to Cardiology    Comprehensive Metabolic Panel    Magnesium      Diagnoses       Codes Comments    Acute midline thoracic back pain    -  Primary ICD-10-CM: M54.6  ICD-9-CM: 724.1     Abnormal ECG     ICD-10-CM: R94.31  ICD-9-CM: 794.31         ECG 12 lead to rule out cardiac. Some mild changes to EKG.  Discussed with Dr. Bender, who agrees that patient should see cardiology.   CMP  Magnesium  Heat to back  Continue tylenol  Continue Tums as needed  Avoid chocolate  Encouraged patient schedule her Wellness visit as she is overdue.   Advised patient to got to ER if mid thoracic pain worsens or becomes persistent, if she develops shortness of breath, dizziness, palpitations or headache.        Return in about 3 months (around 12/1/2022) for Medicare Wellness, Labs.

## 2022-09-01 NOTE — PATIENT INSTRUCTIONS
Return in about 3 months (around 12/1/2022) for Medicare Wellness, Labs.  Heat to back  Continue tylenol  Continue Tums as needed  Avoid chocolate  Encouraged patient schedule her Wellness visit as she is overdue.   Advised patient to got to ER if mid thoracic pain worsens or becomes persistent, if she develops shortness of breath, dizziness, palpitations or headache.

## 2022-09-02 LAB
ALBUMIN SERPL-MCNC: 4.7 G/DL (ref 3.5–5.2)
ALBUMIN/GLOB SERPL: 2 G/DL
ALP SERPL-CCNC: 91 U/L (ref 39–117)
ALT SERPL-CCNC: 12 U/L (ref 1–33)
AST SERPL-CCNC: 15 U/L (ref 1–32)
BILIRUB SERPL-MCNC: 0.3 MG/DL (ref 0–1.2)
BUN SERPL-MCNC: 8 MG/DL (ref 8–23)
BUN/CREAT SERPL: 9.3 (ref 7–25)
CALCIUM SERPL-MCNC: 9.6 MG/DL (ref 8.6–10.5)
CHLORIDE SERPL-SCNC: 106 MMOL/L (ref 98–107)
CO2 SERPL-SCNC: 28 MMOL/L (ref 22–29)
CREAT SERPL-MCNC: 0.86 MG/DL (ref 0.57–1)
EGFRCR-CYS SERPLBLD CKD-EPI 2021: 69.2 ML/MIN/1.73
GLOBULIN SER CALC-MCNC: 2.3 GM/DL
GLUCOSE SERPL-MCNC: 94 MG/DL (ref 65–99)
MAGNESIUM SERPL-MCNC: 2.3 MG/DL (ref 1.6–2.4)
POTASSIUM SERPL-SCNC: 3.9 MMOL/L (ref 3.5–5.2)
PROT SERPL-MCNC: 7 G/DL (ref 6–8.5)
SODIUM SERPL-SCNC: 142 MMOL/L (ref 136–145)

## 2022-09-03 ENCOUNTER — HOSPITAL ENCOUNTER (EMERGENCY)
Facility: HOSPITAL | Age: 77
Discharge: HOME | End: 2022-09-03
Attending: EMERGENCY MEDICINE | Admitting: EMERGENCY MEDICINE

## 2022-09-03 ENCOUNTER — APPOINTMENT (OUTPATIENT)
Dept: GENERAL RADIOLOGY | Facility: HOSPITAL | Age: 77
End: 2022-09-03

## 2022-09-03 VITALS
HEIGHT: 65 IN | SYSTOLIC BLOOD PRESSURE: 147 MMHG | TEMPERATURE: 98 F | WEIGHT: 159 LBS | OXYGEN SATURATION: 98 % | BODY MASS INDEX: 26.49 KG/M2 | HEART RATE: 71 BPM | DIASTOLIC BLOOD PRESSURE: 71 MMHG | RESPIRATION RATE: 15 BRPM

## 2022-09-03 DIAGNOSIS — R91.8 PULMONARY NODULES: ICD-10-CM

## 2022-09-03 DIAGNOSIS — I10 HYPERTENSION, UNSPECIFIED TYPE: Primary | ICD-10-CM

## 2022-09-03 LAB
ALBUMIN SERPL-MCNC: 3.8 G/DL (ref 3.5–5.2)
ALBUMIN/GLOB SERPL: 1.4 G/DL
ALP SERPL-CCNC: 86 U/L (ref 39–117)
ALT SERPL W P-5'-P-CCNC: 13 U/L (ref 1–33)
ANION GAP SERPL CALCULATED.3IONS-SCNC: 9.1 MMOL/L (ref 5–15)
AST SERPL-CCNC: 15 U/L (ref 1–32)
BASOPHILS # BLD AUTO: 0.04 10*3/MM3 (ref 0–0.2)
BASOPHILS NFR BLD AUTO: 0.6 % (ref 0–1.5)
BILIRUB SERPL-MCNC: 0.3 MG/DL (ref 0–1.2)
BUN SERPL-MCNC: 9 MG/DL (ref 8–23)
BUN/CREAT SERPL: 10.8 (ref 7–25)
CALCIUM SPEC-SCNC: 9.5 MG/DL (ref 8.6–10.5)
CHLORIDE SERPL-SCNC: 107 MMOL/L (ref 98–107)
CO2 SERPL-SCNC: 24.9 MMOL/L (ref 22–29)
CREAT SERPL-MCNC: 0.83 MG/DL (ref 0.57–1)
DEPRECATED RDW RBC AUTO: 40.3 FL (ref 37–54)
EGFRCR SERPLBLD CKD-EPI 2021: 72.3 ML/MIN/1.73
EOSINOPHIL # BLD AUTO: 0.07 10*3/MM3 (ref 0–0.4)
EOSINOPHIL NFR BLD AUTO: 1 % (ref 0.3–6.2)
ERYTHROCYTE [DISTWIDTH] IN BLOOD BY AUTOMATED COUNT: 12.1 % (ref 12.3–15.4)
GLOBULIN UR ELPH-MCNC: 2.8 GM/DL
GLUCOSE SERPL-MCNC: 98 MG/DL (ref 65–99)
HCT VFR BLD AUTO: 41.4 % (ref 34–46.6)
HGB BLD-MCNC: 13.7 G/DL (ref 12–15.9)
IMM GRANULOCYTES # BLD AUTO: 0.02 10*3/MM3 (ref 0–0.05)
IMM GRANULOCYTES NFR BLD AUTO: 0.3 % (ref 0–0.5)
LYMPHOCYTES # BLD AUTO: 1.23 10*3/MM3 (ref 0.7–3.1)
LYMPHOCYTES NFR BLD AUTO: 18 % (ref 19.6–45.3)
MCH RBC QN AUTO: 30.1 PG (ref 26.6–33)
MCHC RBC AUTO-ENTMCNC: 33.1 G/DL (ref 31.5–35.7)
MCV RBC AUTO: 91 FL (ref 79–97)
MONOCYTES # BLD AUTO: 0.63 10*3/MM3 (ref 0.1–0.9)
MONOCYTES NFR BLD AUTO: 9.2 % (ref 5–12)
NEUTROPHILS NFR BLD AUTO: 4.84 10*3/MM3 (ref 1.7–7)
NEUTROPHILS NFR BLD AUTO: 70.9 % (ref 42.7–76)
NRBC BLD AUTO-RTO: 0 /100 WBC (ref 0–0.2)
PLATELET # BLD AUTO: 207 10*3/MM3 (ref 140–450)
PMV BLD AUTO: 9.2 FL (ref 6–12)
POTASSIUM SERPL-SCNC: 3.6 MMOL/L (ref 3.5–5.2)
PROT SERPL-MCNC: 6.6 G/DL (ref 6–8.5)
QT INTERVAL: 396 MS
RBC # BLD AUTO: 4.55 10*6/MM3 (ref 3.77–5.28)
SODIUM SERPL-SCNC: 141 MMOL/L (ref 136–145)
TROPONIN T SERPL-MCNC: <0.01 NG/ML (ref 0–0.03)
WBC NRBC COR # BLD: 6.83 10*3/MM3 (ref 3.4–10.8)

## 2022-09-03 PROCEDURE — 80053 COMPREHEN METABOLIC PANEL: CPT | Performed by: NURSE PRACTITIONER

## 2022-09-03 PROCEDURE — 85025 COMPLETE CBC W/AUTO DIFF WBC: CPT | Performed by: NURSE PRACTITIONER

## 2022-09-03 PROCEDURE — 99284 EMERGENCY DEPT VISIT MOD MDM: CPT

## 2022-09-03 PROCEDURE — 93010 ELECTROCARDIOGRAM REPORT: CPT | Performed by: INTERNAL MEDICINE

## 2022-09-03 PROCEDURE — 84484 ASSAY OF TROPONIN QUANT: CPT | Performed by: NURSE PRACTITIONER

## 2022-09-03 PROCEDURE — 71045 X-RAY EXAM CHEST 1 VIEW: CPT

## 2022-09-03 PROCEDURE — 93005 ELECTROCARDIOGRAM TRACING: CPT | Performed by: NURSE PRACTITIONER

## 2022-09-03 RX ORDER — SODIUM CHLORIDE 0.9 % (FLUSH) 0.9 %
10 SYRINGE (ML) INJECTION AS NEEDED
Status: DISCONTINUED | OUTPATIENT
Start: 2022-09-03 | End: 2022-09-03 | Stop reason: HOSPADM

## 2022-09-03 NOTE — ED PROVIDER NOTES
Pt presents to the ED c/o  brief episode of reflux this morning around 4 AM when she had woken up and noticed it with some slight nausea.  Checked her blood pressure and it was elevated 170/70, she had just seen her primary care provider couple days ago and was given cardiology follow-up recommendations.  No fevers, chills, cough, shortness of breath, vomiting, diarrhea.  Eating and drinking well.  No recent travel or recent surgeries.  Currently reports feeling well with no complaints.     On exam,   General: No acute distress, nontoxic, nondiaphoretic  HEENT: Mucous membranes moist, atraumatic, EOMI  Neck: Full ROM  Pulm: Symmetric chest rise, nonlabored, lungs CTAB  Cardiovascular: Regular rate and rhythm, intact distal pulses, no peripheral edema  GI: Soft, nontender, nondistended, no rebound, no guarding, bowel sounds present  MSK: Full ROM, no deformity  Skin: Warm, dry  Neuro: Awake, alert, oriented x 4, GCS 15, moving all extremities, no focal deficits  Psych: Calm, cooperative      N95, protective eye goggles, and gloves used during this encounter. Patient in surgical mask.    EKG    EKG Time: 0647  Rhythm/Rate: Sinus rhythm with rate of 85  Normal axis  Normal intervals  Borderline anterior abnormalities   No STEMI     Interpreted Contemporaneously by me, independently viewed  Stable and no acute emergent findings as compared to December 2017        Plan:   ED Course as of 09/03/22 1433   Sat Sep 03, 2022   0650 Patient is a well-appearing 76-year-old who presents today with elevated blood pressure reading this morning around 4 AM.  Her current blood pressure while I was in the room is 162/82.  Patient currently has no complaints.  We will do basic labs including a troponin, EKG and a chest x-ray to rule out cardiac etiology.  We will continue to monitor her blood pressure. [MS]   0710 Reviewed pt's history and workup with Dr. Sebastian.  After a bedside evaluation, he agrees with the plan of care.  Care  transferred to him at this time.   [MS]   0738 WBC: 6.83 [DC]   0739 Hemoglobin: 13.7 [DC]   0739 Platelets: 207 [DC]   0739 Glucose: 98 [DC]   0739 BUN: 9 [DC]   0739 Creatinine: 0.83 [DC]   0739 Sodium: 141 [DC]   0739 Potassium: 3.6 [DC]   0739 ALT (SGPT): 13 [DC]   0739 AST (SGOT): 15 [DC]   0739 Alkaline Phosphatase: 86 [DC]   0739 Total Bilirubin: 0.3 [DC]   0739 Troponin T: <0.010 [DC]   0746 Patient has been updated on the reassuring blood work, stable EKG and safety for discharge.  Blood pressure 150/93.  We will continue to monitor blood pressure at home, she has never been on any antihypertensive medications and I do not feel like starting them now to be necessarily indicated or appropriate.  ED return precautions given, PCP follow-up within the next 1 to 2 weeks for recheck.  All questions and concerns addressed. [DC]      ED Course User Index  [DC] Colten Sebastian MD  [MS] Marquita Reyes, AMADEO     Labs are reassuring with no evidence of elevated troponin, normal creatinine, no electrolyte abnormalities, no anemia.  Chest x-ray is nonemergent and can continue to follow-up in the outpatient setting with the pulmonary nodules.  At this time she is safe for discharge, well-appearing and in no acute distress.     Diagnosis Plan   1. Hypertension, unspecified type     2. Pulmonary nodules       DISCHARGE    Patient discharged in stable condition.    Reviewed implications of results, diagnosis, meds, responsibility to follow up, warning signs and symptoms of possible worsening, potential complications and reasons to return to ER.    Patient/Family voiced understanding of above instructions.    Discussed plan for discharge, as there is no emergent indication for admission. Patient referred to primary care provider for BP management due to today's BP. Pt/family is agreeable and understands need for follow up and repeat testing.  Pt is aware that discharge does not mean that nothing is wrong but it  indicates no emergency is present that requires admission and they must continue care with follow-up as given below or physician of their choice.     FOLLOW-UP  [facility] Emergency Department  [address]  [facility] [address] [phone]    As needed, If symptoms worsen    Jess Holden MD  [address]  [facility] [address] [phone]    Schedule an appointment as soon as possible for a visit   within 1-2 weeks for recheck         Medication List      No changes were made to your prescriptions during this visit.            Attestation:  The [facility] and I have discussed this patient's history, physical exam, and treatment plan.  I have reviewed the documentation and personally had a face to face interaction with the patient. I affirm the documentation and agree with the treatment and plan.  The attached note describes my personal findings.          Colten Sebastian MD  09/03/22 8815       Colten Sebastian MD  09/03/22 8388       Colten Sebastian MD  09/12/22 9984

## 2022-09-03 NOTE — DISCHARGE INSTRUCTIONS
Continue current medications, stay well-hydrated, eat regular meals, close PCP follow-up within the next 1 to 2 weeks, follow-up with cardiology as scheduled in October.  ED return for worsening symptoms as needed. Continued outpatient follow up of pulmonary nodules.

## 2022-09-03 NOTE — ED NOTES
Patient to ED per private vehicle; ambulatory to triage w/ reports of high BP and nausea. Patient saw PCP yesterday, had EKG and lab work; was told to come to ED if had any concerns.    Patient and staff w/ appropriate PPE in place throughout encounter.

## 2022-09-03 NOTE — ED PROVIDER NOTES
EMERGENCY DEPARTMENT ENCOUNTER    Room Number:  15/15  Date of encounter:  9/3/2022  PCP: Jess Holden MD  Historian: Patient      PPE    Patient was placed in face mask in first look. Patient was wearing facemask when I entered the room and throughout our encounter. I wore full protective equipment throughout this patient encounter including a face mask, and gloves. Hand hygiene was performed before donning protective equipment and after removal when leaving the room.        HPI:  Chief Complaint: Elevated blood pressure  A complete HPI/ROS/PMH/PSH/SH/FH are unobtainable due to: Nothing    Context: Gloria Álvarez is a 76 y.o. female with a history of colon cancer who arrives to the ED via private vehicle.    Patient states that 2 days ago she was seen in her primary care office and was told that she had some changes on her EKG.  She states this morning around 4 AM she woke up with nausea and reflux-like symptoms.  She did check her blood pressure at that time and it was elevated.  She states she is also had this mid upper back pain for about a week that feels muscular in nature.  She states this concerned her and her primary care provider had told her if anything changed to go to the ER.  She states that the nausea and reflux symptoms are currently resolved.  She denies chest pain, shortness of breath, dizziness, lightheaded, diaphoresis or any other symptoms.  She is not currently being treated for hypertension.  She does have an appointment with cardiology on October 28.        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   • Atrophic vaginitis 05/24/2016   • Chronic interstitial cystitis 05/24/2016   • Hyperlipidemia 03/18/2016   • Insomnia 10/02/2014   • Postoperative hypothyroidism 10/01/2015   • Hx of benign carcinoid tumor 07/05/2017   • Hx of diverticulitis of colon 07/05/2017   • Age-related osteoporosis without  current pathological fracture 11/10/2017   • Closed subcapital fracture of left femur (HCC) 12/26/2017   • Hypokalemia 12/26/2017   • Rectal bleeding 02/24/2022     Resolved Ambulatory Problems     Diagnosis Date Noted   • No Resolved Ambulatory Problems     Past Medical History:   Diagnosis Date   • Ankle sprain at least 20 years   • Bursitis of hip 3 years ago   • Colon cancer (HCC) 03/08/2006   • Depression    • Diverticulitis of colon 2012   • Diverticulosis    • Fracture of hip (HCC) 12-   • Fracture, foot 10-11-21   • Knee swelling 6/21/22   • 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   • Osteoporosis    • Personal history of asthma    • Stress fracture January 2018         PAST SURGICAL HISTORY  Past Surgical History:   Procedure Laterality Date   • BRONCHOSCOPY N/A 11/11/2021    Procedure: BRONCHOSCOPY WITH ENDOBRONCHIAL ULTRASOUND AND NAVIGATION, BRUSHINGS, BAL, BX'S, EPI (1:10,000 IN NS);  Surgeon: Clark Richards MD;  Location: [facility] ENDOSCOPY;  Service: Pulmonary;  Laterality: N/A;  pre/post-lung nodule    • CATARACT EXTRACTION Bilateral    • CHOLECYSTECTOMY  2003   • COLONOSCOPY  04/2012    complete   • COLONOSCOPY N/A 07/05/2017    Procedure: COLONOSCOPY TO CECUM;  Surgeon: Andres Lambert MD;  Location: [facility] ENDOSCOPY;  Service:    • COLONOSCOPY N/A 03/02/2022    Procedure: COLONOSCOPY to cecum with 5mL saline lift injections hot snare polypectomy with clip placement x2;  Surgeon: Andres Lambert MD;  Location: [facility] ENDOSCOPY;  Service: Gastroenterology;  Laterality: N/A;  pre- hx rectal bleeding   post- diverticulosis, polyp, HEMORRHOIDS   • FLEXIBLE SIGMOIDOSCOPY  07/27/2006    biopsy clear of all carcinoid   • JOINT REPLACEMENT     • RECTAL TUMOR BY PROCTOTOMY EXCISION      CARCINOID   • REPLACEMENT TOTAL HIP SUPINE POSITION     • TOTAL HIP ARTHROPLASTY Left 12/27/2017    Procedure: TOTAL HIP ARTHROPLASTY ANTERIOR WITH HANA TABLE;  Surgeon: Oral Ford MD;  Location:   [facility];  Service:    • TOTAL THYROIDECTOMY      7/3/12 ESTEFANIA / DR DEE/ 3CM PAP MET 1/9 NODULE RIGHT LOBE / LEFT LOBE -   • TRIGGER POINT INJECTION  3 years ago    Left Hip Bursa Sac         FAMILY HISTORY  Family History   Problem Relation Age of Onset   • Hypertension Mother    • Broken bones Mother         Little toes   • Severe sprains Mother         Ankle Sprains   • Hypertension Brother    • Colon polyps Brother         No Cancer   • Malig Hyperthermia Neg Hx          SOCIAL HISTORY  Social History     Socioeconomic History   • Marital status:    Tobacco Use   • Smoking status: Never Smoker   • Smokeless tobacco: Never Used   Vaping Use   • Vaping Use: Never used   Substance and Sexual Activity   • Alcohol use: Never   • Drug use: Never   • Sexual activity: Defer     Partners: Male         ALLERGIES  Ciprofloxacin, Sulfa antibiotics, Varicella virus vaccine live, and Zoster vaccine live        REVIEW OF SYSTEMS  Review of Systems     All systems reviewed and negative except for those discussed in HPI.        PHYSICAL EXAM    ED Triage Vitals [09/03/22 0527]   Temp Heart Rate Resp BP SpO2   97.9 °F (36.6 °C) 83 18 (!) 176/102 97 %       Physical Exam  GENERAL: Well appearing, nontoxic appearing, not distressed  HENT: normocephalic, atraumatic  EYES: no scleral icterus, PERRL  CV: regular rhythm, regular rate, no murmur  RESPIRATORY: normal effort, CTAB  ABDOMEN: soft, nontender  MUSCULOSKELETAL: no deformity, no calf tenderness or lower extremity edema bilaterally  NEURO: alert, moves all extremities, follows commands, mental status normal/baseline  SKIN: warm, dry, no rash   Psych: Appropriate mood and affect  Nursing notes and vital signs reviewed      LAB RESULTS  Recent Results (from the past 24 hour(s))   ECG 12 Lead    Collection Time: 09/03/22  6:47 AM   Result Value Ref Range    QT Interval 396 ms   Comprehensive Metabolic Panel    Collection Time: 09/03/22  6:51 AM    Specimen: Blood    Result Value Ref Range    Glucose 98 65 - 99 mg/dL    BUN 9 8 - 23 mg/dL    Creatinine 0.83 0.57 - 1.00 mg/dL    Sodium 141 136 - 145 mmol/L    Potassium 3.6 3.5 - 5.2 mmol/L    Chloride 107 98 - 107 mmol/L    CO2 24.9 22.0 - 29.0 mmol/L    Calcium 9.5 8.6 - 10.5 mg/dL    Total Protein 6.6 6.0 - 8.5 g/dL    Albumin 3.80 3.50 - 5.20 g/dL    ALT (SGPT) 13 1 - 33 U/L    AST (SGOT) 15 1 - 32 U/L    Alkaline Phosphatase 86 39 - 117 U/L    Total Bilirubin 0.3 0.0 - 1.2 mg/dL    Globulin 2.8 gm/dL    A/G Ratio 1.4 g/dL    BUN/Creatinine Ratio 10.8 7.0 - 25.0    Anion Gap 9.1 5.0 - 15.0 mmol/L    eGFR 72.3 >60.0 mL/min/1.73   Troponin    Collection Time: 09/03/22  6:51 AM    Specimen: Blood   Result Value Ref Range    Troponin T <0.010 0.000 - 0.030 ng/mL   CBC Auto Differential    Collection Time: 09/03/22  6:51 AM    Specimen: Blood   Result Value Ref Range    WBC 6.83 3.40 - 10.80 10*3/mm3    RBC 4.55 3.77 - 5.28 10*6/mm3    Hemoglobin 13.7 12.0 - 15.9 g/dL    Hematocrit 41.4 34.0 - 46.6 %    MCV 91.0 79.0 - 97.0 fL    MCH 30.1 26.6 - 33.0 pg    MCHC 33.1 31.5 - 35.7 g/dL    RDW 12.1 (L) 12.3 - 15.4 %    RDW-SD 40.3 37.0 - 54.0 fl    MPV 9.2 6.0 - 12.0 fL    Platelets 207 140 - 450 10*3/mm3    Neutrophil % 70.9 42.7 - 76.0 %    Lymphocyte % 18.0 (L) 19.6 - 45.3 %    Monocyte % 9.2 5.0 - 12.0 %    Eosinophil % 1.0 0.3 - 6.2 %    Basophil % 0.6 0.0 - 1.5 %    Immature Grans % 0.3 0.0 - 0.5 %    Neutrophils, Absolute 4.84 1.70 - 7.00 10*3/mm3    Lymphocytes, Absolute 1.23 0.70 - 3.10 10*3/mm3    Monocytes, Absolute 0.63 0.10 - 0.90 10*3/mm3    Eosinophils, Absolute 0.07 0.00 - 0.40 10*3/mm3    Basophils, Absolute 0.04 0.00 - 0.20 10*3/mm3    Immature Grans, Absolute 0.02 0.00 - 0.05 10*3/mm3    nRBC 0.0 0.0 - 0.2 /100 WBC       Ordered the above labs and independently reviewed the results.      RADIOLOGY  XR Chest 1 View    Result Date: 9/3/2022  CHEST SINGLE VIEW  HISTORY: Hypertension. History of papillary carcinoma of  the thyroid.  COMPARISON: CT chest 11/09/2021, AP chest 12/26/2017.  FINDINGS: Heart size upper normal. Aortic vascular calcifications are present. There is a 1.6 cm left perihilar/infrahilar nodule and there is a 1.2 cm nodule lateral to the right hilum. These nodules are better followed with CT and the prior CT 11/09/2021 demonstrated several bilateral nodules. There is no evidence for superimposed airspace disease or pulmonary edema or pleural effusion.      Bilateral noncalcified pulmonary nodules. Pulmonary nodules are demonstrated on previous CT 11/09/2021 are better followed with CT. There is no evidence for superimposed airspace disease or pulmonary edema or pleural effusion.  This report was finalized on 9/3/2022 7:48 AM by Dr. Bryce Johnson M.D.        I ordered the above noted radiological studies and viewed the images on the PACS system.       EKG      Independently viewed by me and interpreted by Dr Sebastian         MEDICAL RECORD REVIEW  Medical records reviewed in [facility], patient did see her primary care provider on September 1 for her midline thoracic back pain.      PROCEDURES    Procedures        DIFFERENTIAL DIAGNOSIS  Differential diagnosis for hypertension include but are not limited to the following: Drug use, sleep apnea, heart failure, MI, drug toxicity, stroke, kidney disease        PROGRESS, DATA ANALYSIS, CONSULTS, AND MEDICAL DECISION MAKING        ED Course as of 09/03/22 1256   Sat Sep 03, 2022   0650 Patient is a well-appearing 76-year-old who presents today with elevated blood pressure reading this morning around 4 AM.  Her current blood pressure while I was in the room is 162/82.  Patient currently has no complaints.  We will do basic labs including a troponin, EKG and a chest x-ray to rule out cardiac etiology.  We will continue to monitor her blood pressure. [MS]   0710 Reviewed pt's history and workup with Dr. Sebastian.  After a bedside evaluation, he agrees with the plan of care.  Care  transferred to him at this time.   [MS]   0738 WBC: 6.83 [DC]   0739 Hemoglobin: 13.7 [DC]   0739 Platelets: 207 [DC]   0739 Glucose: 98 [DC]   0739 BUN: 9 [DC]   0739 Creatinine: 0.83 [DC]   0739 Sodium: 141 [DC]   0739 Potassium: 3.6 [DC]   0739 ALT (SGPT): 13 [DC]   0739 AST (SGOT): 15 [DC]   0739 Alkaline Phosphatase: 86 [DC]   0739 Total Bilirubin: 0.3 [DC]   0739 Troponin T: <0.010 [DC]   0746 Patient has been updated on the reassuring blood work, stable EKG and safety for discharge.  Blood pressure 150/93.  We will continue to monitor blood pressure at home, she has never been on any antihypertensive medications and I do not feel like starting them now to be necessarily indicated or appropriate.  ED return precautions given, PCP follow-up within the next 1 to 2 weeks for recheck.  All questions and concerns addressed. [DC]      ED Course User Index  [DC] Colten Sebastian MD  [MS] Marquita Reyes, APRDUNIA           MEDICATIONS GIVEN IN ED    Medications - No data to display        COURSE & MEDICAL DECISION MAKING  Any/All labs and Any/All Imaging studies that were ordered were reviewed and are noted above.  Results were reviewed/discussed with the patient and they were also made aware of online access.    Pt also made aware that some labs, such as cultures, will not be resulted during ER visit and followup with PMD is necessary.        Marquita Reyes APRN  09/03/22 [id number]

## 2022-09-08 ENCOUNTER — OFFICE VISIT (OUTPATIENT)
Dept: FAMILY MEDICINE CLINIC | Facility: CLINIC | Age: 77
End: 2022-09-08

## 2022-09-08 VITALS
DIASTOLIC BLOOD PRESSURE: 78 MMHG | HEART RATE: 84 BPM | OXYGEN SATURATION: 99 % | WEIGHT: 160 LBS | SYSTOLIC BLOOD PRESSURE: 150 MMHG | HEIGHT: 65 IN | BODY MASS INDEX: 26.66 KG/M2

## 2022-09-08 DIAGNOSIS — I10 PRIMARY HYPERTENSION: Primary | ICD-10-CM

## 2022-09-08 DIAGNOSIS — K21.9 GASTROESOPHAGEAL REFLUX DISEASE WITHOUT ESOPHAGITIS: ICD-10-CM

## 2022-09-08 PROCEDURE — 99214 OFFICE O/P EST MOD 30 MIN: CPT | Performed by: NURSE PRACTITIONER

## 2022-09-08 RX ORDER — LISINOPRIL 10 MG/1
10 TABLET ORAL DAILY
Qty: 90 TABLET | Refills: 1 | Status: SHIPPED | OUTPATIENT
Start: 2022-09-08 | End: 2022-12-24

## 2022-09-08 RX ORDER — FAMOTIDINE 20 MG/1
20 TABLET, FILM COATED ORAL 2 TIMES DAILY
Qty: 180 TABLET | Refills: 1 | Status: SHIPPED | OUTPATIENT
Start: 2022-09-08 | End: 2022-10-28

## 2022-09-08 RX ORDER — FAMOTIDINE 20 MG/1
20 TABLET, FILM COATED ORAL
Status: DISCONTINUED | OUTPATIENT
Start: 2022-09-08 | End: 2022-09-08

## 2022-09-08 NOTE — PROGRESS NOTES
Subjective   Gloria Álvarez is a 76 y.o. female.     History of Present Illness   Patient presents for follow-up recent ER visit for hypertension.  She reports that she woke up at 4AM with reflux and her blood pressure was elevated. Her cardiac workup was essentially normal. Blood pressure is still elevated at visit today. She has never taken any medications for this. She does have strong family history of hypertension. Patient denies any chest pain, shortness of breath, dizziness, palpitations or headache. She has appointment with cardiology on 10/28/2022. Patient reports that she's been under a lot of stress lately due to death of a friend.     The following portions of the patient's history were reviewed and updated as appropriate: allergies, current medications, past family history, past medical history, past social history, past surgical history and problem list.    Review of Systems   Constitutional: Negative for chills, fatigue and fever.   Eyes: Negative for blurred vision and double vision.   Respiratory: Negative for cough, chest tightness, shortness of breath and wheezing.    Cardiovascular: Negative for chest pain, palpitations and leg swelling.   Gastrointestinal: Positive for GERD.   Neurological: Negative for dizziness, weakness and headache.       Objective   Physical Exam  Vitals and nursing note reviewed.   Constitutional:       Appearance: She is well-developed.   HENT:      Head: Normocephalic and atraumatic.   Eyes:      Conjunctiva/sclera: Conjunctivae normal.      Pupils: Pupils are equal, round, and reactive to light.   Neck:      Thyroid: No thyromegaly.   Cardiovascular:      Rate and Rhythm: Normal rate and regular rhythm.      Pulses: Normal pulses.      Heart sounds: Normal heart sounds. No murmur heard.    No friction rub. No gallop.   Pulmonary:      Effort: Pulmonary effort is normal.      Breath sounds: Normal breath sounds.   Musculoskeletal:      Cervical back: Normal range of  motion and neck supple.   Lymphadenopathy:      Cervical: No cervical adenopathy.   Skin:     General: Skin is warm and dry.      Capillary Refill: Capillary refill takes 2 to 3 seconds.   Neurological:      Mental Status: She is alert and oriented to person, place, and time.      Cranial Nerves: No cranial nerve deficit.   Psychiatric:         Behavior: Behavior normal.         Thought Content: Thought content normal.         Judgment: Judgment normal.         Vitals:    09/08/22 1030   BP: 150/78   Pulse: 84   SpO2: 99%     Body mass index is 26.63 kg/m².    Procedures    Assessment & Plan   Problems Addressed this Visit    None     Visit Diagnoses     Primary hypertension    -  Primary    Relevant Medications    lisinopril (PRINIVIL,ZESTRIL) 10 MG tablet    Gastroesophageal reflux disease without esophagitis        Relevant Medications    famotidine (PEPCID) tablet 20 mg (Start on 9/8/2022  5:30 PM)      Diagnoses       Codes Comments    Primary hypertension    -  Primary ICD-10-CM: I10  ICD-9-CM: 401.9     Gastroesophageal reflux disease without esophagitis     ICD-10-CM: K21.9  ICD-9-CM: 530.81         Reviewed recent ER notes, labs and tests with patient.   Will start lisinopril 10 mg 1 p.o. QD  Will start famotidine 20 mg 1 p.o. BID  Follow-up with cardiology as scheduled.   DASH diet information provided  Monitor your blood pressure at home, if consistently greater than 140/90.   If you develop chest pain, shortness of breath, increased dizziness, palpitations or a headache that will not resolve, go to ER.          Return in about 2 weeks (around 9/22/2022) for Recheck BP.

## 2022-09-08 NOTE — PATIENT INSTRUCTIONS
Monitor your blood pressure at home, if consistently greater than 140/90.   If you develop chest pain, shortness of breath, increased dizziness, palpitations or a headache that will not resolve, go to ER.   Return in about 2 weeks (around 9/22/2022) for Recheck BP.  Call with any questions or concerns.

## 2022-09-12 ENCOUNTER — TELEPHONE (OUTPATIENT)
Dept: FAMILY MEDICINE CLINIC | Facility: CLINIC | Age: 77
End: 2022-09-12

## 2022-09-12 NOTE — TELEPHONE ENCOUNTER
Caller: Gloria Álvarez    Relationship: Self    Best call back number:[phone]    What is the best time to reach you:ANY    Who are you requesting to speak with (clinical staff, provider,  specific staff member): CLINICAL STAFF    Do you know the name of the person who called: PATIENT    What was the call regarding: SHE STARTED ON LISINIPRIL Thursday AFTERNOON AND HER BLOOD PRESSURE IS GOING DOWN AND HER BOTTOM NUMBER IS IN THE 60'S.  SHE ISN'T SURE IF THIS IS OK OR NOT.     Do you require a callback: YES.  CALL BOTH NUMBERS IF YOU CAN'T GET HER ON THE FIRST ONE.

## 2022-09-22 ENCOUNTER — OFFICE VISIT (OUTPATIENT)
Dept: FAMILY MEDICINE CLINIC | Facility: CLINIC | Age: 77
End: 2022-09-22

## 2022-09-22 VITALS
WEIGHT: 157 LBS | DIASTOLIC BLOOD PRESSURE: 70 MMHG | BODY MASS INDEX: 26.8 KG/M2 | OXYGEN SATURATION: 98 % | HEIGHT: 64 IN | SYSTOLIC BLOOD PRESSURE: 130 MMHG | HEART RATE: 75 BPM

## 2022-09-22 DIAGNOSIS — K21.9 GASTROESOPHAGEAL REFLUX DISEASE WITHOUT ESOPHAGITIS: ICD-10-CM

## 2022-09-22 DIAGNOSIS — I10 PRIMARY HYPERTENSION: Primary | ICD-10-CM

## 2022-09-22 PROCEDURE — 99214 OFFICE O/P EST MOD 30 MIN: CPT | Performed by: NURSE PRACTITIONER

## 2022-09-22 NOTE — PATIENT INSTRUCTIONS
Return in about 3 months (around 12/22/2022) for Next scheduled follow up.  Call with any questions or concerns.   Continue lisinopril as prescribed.

## 2022-09-22 NOTE — PROGRESS NOTES
Subjective   Gloria Álvarez is a 76 y.o. female.     History of Present Illness   Patient presents for follow-up for hypertension, ongoing.  Her blood pressure has improved with medication. She is taking her blood pressure medication daily and denies any adverse side affects. She has been taking her blood pressure at home and systolic has ranged from 109-132 and diastolic 61-78.  Patient denies any chest pain, shortness of breath, dizziness, palpitations or headaches. Patient is very anxious about upcoming cardiology visit. Patient reports that she is really watching her calories and salt. She is down 3 lbs since her last visit.     Patient is also being seen for follow-up for GERD. I started famotidine 20mg 1p.o. BID for this. She reports that the medication is working well and she denies any adverse side affects from the medication.     The following portions of the patient's history were reviewed and updated as appropriate: allergies, current medications, past family history, past medical history, past social history, past surgical history and problem list.    Review of Systems   Constitutional: Negative for chills, fatigue and fever.   Eyes: Negative for blurred vision and double vision.   Respiratory: Negative for cough, chest tightness, shortness of breath and wheezing.    Cardiovascular: Negative for chest pain, palpitations and leg swelling.   Gastrointestinal: Positive for GERD. Negative for anal bleeding, blood in stool, rectal pain and vomiting.   Neurological: Negative for dizziness, weakness and headache.       Objective   Physical Exam  Vitals and nursing note reviewed.   Constitutional:       Appearance: Normal appearance. She is well-developed and normal weight.   HENT:      Head: Normocephalic and atraumatic.   Eyes:      Conjunctiva/sclera: Conjunctivae normal.      Pupils: Pupils are equal, round, and reactive to light.   Neck:      Thyroid: No thyromegaly.   Cardiovascular:      Rate and  Rhythm: Normal rate and regular rhythm.      Pulses: Normal pulses.      Heart sounds: Normal heart sounds. No murmur heard.    No friction rub. No gallop.   Pulmonary:      Effort: Pulmonary effort is normal.      Breath sounds: Normal breath sounds.   Musculoskeletal:      Cervical back: Normal range of motion and neck supple.   Lymphadenopathy:      Cervical: No cervical adenopathy.   Skin:     General: Skin is warm and dry.      Capillary Refill: Capillary refill takes 2 to 3 seconds.   Neurological:      Mental Status: She is alert and oriented to person, place, and time.      Cranial Nerves: No cranial nerve deficit.   Psychiatric:         Behavior: Behavior normal.         Thought Content: Thought content normal.         Judgment: Judgment normal.         Vitals:    09/22/22 1010   BP: 130/70   Pulse:    SpO2:      Body mass index is 26.95 kg/m².      Procedures    Assessment & Plan   Problems Addressed this Visit    None     Visit Diagnoses     Primary hypertension    -  Primary    Gastroesophageal reflux disease without esophagitis          Diagnoses       Codes Comments    Primary hypertension    -  Primary ICD-10-CM: I10  ICD-9-CM: 401.9     Gastroesophageal reflux disease without esophagitis     ICD-10-CM: K21.9  ICD-9-CM: 530.81         Continue lisinopril 20mg 1p.o. QD  Continue famotidine 20mg 1p.o. BID         Return in about 3 months (around 12/22/2022) for Next scheduled follow up.  Answers for HPI/ROS submitted by the patient on 9/17/2022  What is the primary reason for your visit?: High Blood Pressure

## 2022-10-28 ENCOUNTER — OFFICE VISIT (OUTPATIENT)
Dept: CARDIOLOGY | Facility: CLINIC | Age: 77
End: 2022-10-28

## 2022-10-28 VITALS
DIASTOLIC BLOOD PRESSURE: 90 MMHG | HEART RATE: 85 BPM | HEIGHT: 64 IN | WEIGHT: 161 LBS | SYSTOLIC BLOOD PRESSURE: 162 MMHG | BODY MASS INDEX: 27.49 KG/M2

## 2022-10-28 DIAGNOSIS — R07.89 OTHER CHEST PAIN: ICD-10-CM

## 2022-10-28 DIAGNOSIS — R94.31 ABNORMAL ECG: Primary | ICD-10-CM

## 2022-10-28 DIAGNOSIS — R06.02 SOB (SHORTNESS OF BREATH): ICD-10-CM

## 2022-10-28 PROCEDURE — 99204 OFFICE O/P NEW MOD 45 MIN: CPT | Performed by: INTERNAL MEDICINE

## 2022-10-28 NOTE — PROGRESS NOTES
"    Subjective:     Encounter Date:10/28/22      Patient ID: Gloria Álvarez is a 76 y.o. female.    Chief Complaint:  History of Present Illness    Dear Dr. Ravi,    I had the pleasure of seeing this patient in the office today for initial evaluation and consultation.  I appreciate that you sent her in to see us.  They come in today to be seen for evaluation of an abnormal EKG and back pain.    Patient's been having episodes of pain between her shoulder blades.  This is tended to bother her more when she is active than when she is not.  She has noted some fatigue at times.  No anterior chest pain.  This patient has no known cardiac history.  This patient has no history of coronary artery disease, congestive heart failure, rheumatic fever, rheumatic heart disease, congenital heart disease or heart murmur.  This patient has never required invasive cardiovascular evaluation.    She was in your office and had an EKG that showed nonspecific T wave changes in the anterior leads.  She was actually seen in the ER couple of days after that and likewise had anterior T wave changes that were unchanged.  Other evaluation was unremarkable.    The following portions of the patient's history were reviewed and updated as appropriate: allergies, current medications, past family history, past medical history, past social history, past surgical history and problem list.    Procedures       Objective:     Vitals:    10/28/22 0914   BP: 162/90   Pulse: 85   Weight: 73 kg (161 lb)   Height: 162.6 cm (64\")     Body mass index is 27.64 kg/m².      Physical Exam    Data and records reviewed:     Lab Results   Component Value Date    GLUCOSE 98 09/03/2022    BUN 9 09/03/2022    CREATININE 0.83 09/03/2022    EGFRIFNONA 66 06/30/2021    EGFRIFAFRI 80 06/30/2021    BCR 10.8 09/03/2022    K 3.6 09/03/2022    CO2 24.9 09/03/2022    CALCIUM 9.5 09/03/2022    PROTENTOTREF 7.0 09/01/2022    ALBUMIN 3.80 09/03/2022    LABIL2 2.0 09/01/2022    " AST 15 09/03/2022    ALT 13 09/03/2022     No results found for: CHOL  Lab Results   Component Value Date    TRIG 113 06/30/2021    TRIG 128 09/21/2020    TRIG 77 07/03/2018     Lab Results   Component Value Date    HDL 58 06/30/2021    HDL 54 09/21/2020    HDL 68 07/03/2018     Lab Results   Component Value Date    LDL 88 06/30/2021    LDL 84 09/21/2020    LDL 78 07/03/2018     Lab Results   Component Value Date    VLDL 20 06/30/2021    VLDL 26 09/21/2020    VLDL 15 07/03/2018     No results found for: LDLHDL  CBC    CBC 9/3/22   WBC 6.83   RBC 4.55   Hemoglobin 13.7   Hematocrit 41.4   MCV 91.0   MCH 30.1   MCHC 33.1   RDW 12.1 (A)   Platelets 207   (A) Abnormal value            XR Chest 1 View    Result Date: 9/3/2022  Bilateral noncalcified pulmonary nodules. Pulmonary nodules are demonstrated on previous CT 11/09/2021 are better followed with CT. There is no evidence for superimposed airspace disease or pulmonary edema or pleural effusion.  This report was finalized on 9/3/2022 7:48 AM by Dr. Bryce Johnson M.D.              Assessment:          Diagnosis Plan   1. Abnormal ECG  Stress Test With Myocardial Perfusion One Day      2. Other chest pain  Stress Test With Myocardial Perfusion One Day      3. SOB (shortness of breath)  Stress Test With Myocardial Perfusion One Day             Plan:       1.  Abnormal EKG, exertional back discomfort that could be an anginal equivalent, some fatigue.  I do note that she had a CT of her chest without contrast November 2021 performed by Dr. Sanchez and coronary artery calcification was noted on that report.  When I review the images it appears to be fairly modest.  We will set her up for a stress test, she think she can walk on a treadmill okay so we will do a stress nuclear perfusion study.  Further evaluation treatment predicated on results  2.  Fatigability, some shortness of breath, await the results of the perfusion study.    Thank you very much for allowing us to  participate in the care of this pleasant patient.  Please don't hesitate to call if I can be of assistance in any way.      Current Outpatient Medications:   •  Calcium Carbonate-Vit D-Min (CALTRATE 600+D PLUS MINERALS) 600-800 MG-UNIT chewable tablet, Chew 1 tablet Daily., Disp: , Rfl:   •  Cholecalciferol (VITAMIN D3) 1000 units capsule, Take  by mouth., Disp: , Rfl:   •  levothyroxine (SYNTHROID, LEVOTHROID) 137 MCG tablet, Take 137 mcg by mouth Daily. Dr. Horton is controlling, Disp: , Rfl:   •  lisinopril (PRINIVIL,ZESTRIL) 10 MG tablet, Take 1 tablet by mouth Daily., Disp: 90 tablet, Rfl: 1  •  triamcinolone (KENALOG) 0.1 % cream, As Needed., Disp: , Rfl:   •  vitamin E 400 UNIT capsule, Take 400 Units by mouth Daily., Disp: , Rfl:          No follow-ups on file.

## 2022-11-01 ENCOUNTER — OFFICE VISIT (OUTPATIENT)
Dept: FAMILY MEDICINE CLINIC | Facility: CLINIC | Age: 77
End: 2022-11-01

## 2022-11-01 VITALS
HEIGHT: 64 IN | OXYGEN SATURATION: 98 % | BODY MASS INDEX: 27.49 KG/M2 | DIASTOLIC BLOOD PRESSURE: 78 MMHG | HEART RATE: 84 BPM | RESPIRATION RATE: 18 BRPM | WEIGHT: 161 LBS | SYSTOLIC BLOOD PRESSURE: 140 MMHG

## 2022-11-01 DIAGNOSIS — L02.419 AXILLARY ABSCESS: Primary | ICD-10-CM

## 2022-11-01 PROCEDURE — 99213 OFFICE O/P EST LOW 20 MIN: CPT | Performed by: FAMILY MEDICINE

## 2022-11-01 RX ORDER — BNT162B2 ORIGINAL AND OMICRON BA.4/BA.5 .1125; .1125 MG/2.25ML; MG/2.25ML
INJECTION, SUSPENSION INTRAMUSCULAR
COMMUNITY
Start: 2022-09-19 | End: 2022-11-01

## 2022-11-01 NOTE — PROGRESS NOTES
"Chief Complaint  Cyst (Knot under right armpit since yesterday night. PT states it is painful)    Subjective    History of Present Illness {CC  Problem List  Visit  Diagnosis   Encounters  Notes  Medications  Labs  Result Review Imaging  Media :23}     Gloria Álvarez presents to [facility] for Cyst (Knot under right armpit since yesterday night. PT states it is painful).  History of Present Illness     Here with concern as above. Noticed some pain in her right axilla over the past day or 2. Has concerns given her history of papillary carcinoma thyroid. Wanted it checked out immediately. Has never had any problems like this in the past.    Objective     Vital Signs:   /78 (BP Location: Left arm, Patient Position: Sitting, Cuff Size: Adult)   Pulse 84   Resp 18   Ht 162.6 cm (64\")   Wt 73 kg (161 lb)   SpO2 98%   BMI 27.64 kg/m²   Physical Exam  Vitals and nursing note reviewed.   Constitutional:       General: She is not in acute distress.     Appearance: Normal appearance. She is not ill-appearing.   Skin:     Comments: Small draining abscess in the right axilla, very slight surrounding erythema, no warmth.   Neurological:      Mental Status: She is alert.          Result Review  Data Reviewed:{ Labs  Result Review  Imaging  Med Tab  Media :23}                   Assessment and Plan {CC Problem List  Visit Diagnosis  ROS  Review (Popup)  Health Maintenance  Quality  BestPractice  Medications  SmartSets  SnapShot Encounters  Media :23}   Diagnoses and all orders for this visit:    1. Axillary abscess (Primary)    Scant fluid expressed. Recommended she apply warm compress and keep me updated as to her progress. Anticipate resolution with time.    Patient was given instructions and counseling regarding her condition or for health maintenance advice. Please see specific information pulled into the AVS (placed there by myself) if " appropriate.    Return if symptoms worsen or fail to improve.      LEV Bonds MD

## 2022-11-04 ENCOUNTER — HOSPITAL ENCOUNTER (OUTPATIENT)
Dept: CARDIOLOGY | Facility: HOSPITAL | Age: 77
Discharge: HOME | End: 2022-11-04
Admitting: INTERNAL MEDICINE

## 2022-11-04 VITALS — BODY MASS INDEX: 27.48 KG/M2 | HEIGHT: 64 IN | WEIGHT: 160.94 LBS

## 2022-11-04 DIAGNOSIS — R94.31 ABNORMAL ECG: ICD-10-CM

## 2022-11-04 DIAGNOSIS — R07.89 OTHER CHEST PAIN: ICD-10-CM

## 2022-11-04 DIAGNOSIS — R06.02 SOB (SHORTNESS OF BREATH): ICD-10-CM

## 2022-11-04 LAB
BH CV NUCLEAR PRIOR STUDY: 3
BH CV REST NUCLEAR ISOTOPE DOSE: 11.1 MCI
BH CV STRESS BP STAGE 1: NORMAL
BH CV STRESS BP STAGE 2: NORMAL
BH CV STRESS DURATION MIN STAGE 1: 3
BH CV STRESS DURATION MIN STAGE 2: 3
BH CV STRESS DURATION SEC STAGE 1: 0
BH CV STRESS DURATION SEC STAGE 2: 0
BH CV STRESS GRADE STAGE 1: 10
BH CV STRESS GRADE STAGE 2: 12
BH CV STRESS HR STAGE 1: 119
BH CV STRESS HR STAGE 2: 152
BH CV STRESS METS STAGE 1: 5
BH CV STRESS METS STAGE 2: 7.5
BH CV STRESS NUCLEAR ISOTOPE DOSE: 34.2 MCI
BH CV STRESS PROTOCOL 1: NORMAL
BH CV STRESS RECOVERY BP: NORMAL MMHG
BH CV STRESS RECOVERY HR: 98 BPM
BH CV STRESS SPEED STAGE 1: 1.7
BH CV STRESS SPEED STAGE 2: 2.5
BH CV STRESS STAGE 1: 1
BH CV STRESS STAGE 2: 2
LV EF NUC BP: 67 %
MAXIMAL PREDICTED HEART RATE: 142 BPM
PERCENT MAX PREDICTED HR: 107.04 %
STRESS BASELINE BP: NORMAL MMHG
STRESS BASELINE HR: 85 BPM
STRESS PERCENT HR: 126 %
STRESS POST ESTIMATED WORKLOAD: 7.5 METS
STRESS POST EXERCISE DUR MIN: 6 MIN
STRESS POST EXERCISE DUR SEC: 0 SEC
STRESS POST PEAK BP: NORMAL MMHG
STRESS POST PEAK HR: 152 BPM
STRESS TARGET HR: 121 BPM

## 2022-11-04 PROCEDURE — 78452 HT MUSCLE IMAGE SPECT MULT: CPT

## 2022-11-04 PROCEDURE — 93016 CV STRESS TEST SUPVJ ONLY: CPT | Performed by: INTERNAL MEDICINE

## 2022-11-04 PROCEDURE — 93017 CV STRESS TEST TRACING ONLY: CPT

## 2022-11-04 PROCEDURE — 0 TECHNETIUM TETROFOSMIN KIT: Performed by: INTERNAL MEDICINE

## 2022-11-04 PROCEDURE — A9502 TC99M TETROFOSMIN: HCPCS | Performed by: INTERNAL MEDICINE

## 2022-11-04 PROCEDURE — 78452 HT MUSCLE IMAGE SPECT MULT: CPT | Performed by: INTERNAL MEDICINE

## 2022-11-04 PROCEDURE — 93018 CV STRESS TEST I&R ONLY: CPT | Performed by: INTERNAL MEDICINE

## 2022-11-04 RX ADMIN — TETROFOSMIN 1 DOSE: 1.38 INJECTION, POWDER, LYOPHILIZED, FOR SOLUTION INTRAVENOUS at 11:15

## 2022-11-04 RX ADMIN — TETROFOSMIN 1 DOSE: 1.38 INJECTION, POWDER, LYOPHILIZED, FOR SOLUTION INTRAVENOUS at 12:12

## 2022-11-07 ENCOUNTER — TELEPHONE (OUTPATIENT)
Dept: CARDIOLOGY | Facility: CLINIC | Age: 77
End: 2022-11-07

## 2022-11-16 ENCOUNTER — TELEPHONE (OUTPATIENT)
Dept: FAMILY MEDICINE CLINIC | Facility: CLINIC | Age: 77
End: 2022-11-16

## 2022-11-16 NOTE — TELEPHONE ENCOUNTER
Caller: [facility] - [place], [place] - [address] - [phone] [facility] [phone] FX    Relationship: Pharmacy        What medication are you requesting: OMEPRAZOLE     What are your current symptoms:     How long have you been experiencing symptoms:     Have you had these symptoms before:    [x] Yes  [] No    Have you been treated for these symptoms before:   [x] Yes  [] No    If a prescription is needed, what is your preferred pharmacy and phone number: [facility] - [place], [place] - [address] - [phone] [facility] [phone] FX     Additional notes:

## 2022-11-17 ENCOUNTER — TELEPHONE (OUTPATIENT)
Dept: FAMILY MEDICINE CLINIC | Facility: CLINIC | Age: 77
End: 2022-11-17

## 2022-11-17 DIAGNOSIS — K21.9 GASTROESOPHAGEAL REFLUX DISEASE WITHOUT ESOPHAGITIS: ICD-10-CM

## 2022-11-17 DIAGNOSIS — K21.9 GASTROESOPHAGEAL REFLUX DISEASE WITHOUT ESOPHAGITIS: Primary | ICD-10-CM

## 2022-11-17 RX ORDER — OMEPRAZOLE 20 MG/1
20 CAPSULE, DELAYED RELEASE ORAL DAILY
Qty: 90 CAPSULE | Refills: 3 | Status: SHIPPED | OUTPATIENT
Start: 2022-11-17

## 2022-11-17 RX ORDER — OMEPRAZOLE 20 MG/1
20 CAPSULE, DELAYED RELEASE ORAL DAILY
Qty: 10 CAPSULE | Refills: 0 | Status: SHIPPED | OUTPATIENT
Start: 2022-11-17 | End: 2022-11-17 | Stop reason: SDUPTHER

## 2022-11-17 NOTE — TELEPHONE ENCOUNTER
Caller: Gloria Álvarez    Relationship: Self    Best call back number: [phone]  What is the best time to reach you:ANYTIME  Who are you requesting to speak with (clinical staff, provider,  specific staff member): CLINICAL STAFF  Do you know the name of the person who called: SELF  What was the call regarding: PATIENT CALLED AND STATED HER OMEPRAZOLE DID NOT GET REFILLED. PLEASE SEND TO PHARMACY. PATIENT STATES SHE HAS LESS THAN A 1 DAY SUPPLY. THANKS   Do you require a callback:NO

## 2022-11-28 DIAGNOSIS — E78.5 HYPERLIPIDEMIA, UNSPECIFIED HYPERLIPIDEMIA TYPE: Primary | ICD-10-CM

## 2022-11-28 DIAGNOSIS — Z13.1 DIABETES MELLITUS SCREENING: ICD-10-CM

## 2022-11-28 DIAGNOSIS — Z13.228 ENCOUNTER FOR SCREENING FOR OTHER METABOLIC DISORDERS: Primary | ICD-10-CM

## 2022-11-28 DIAGNOSIS — I10 PRIMARY HYPERTENSION: ICD-10-CM

## 2022-11-28 LAB
ALBUMIN SERPL-MCNC: 4.3 G/DL (ref 3.5–5.2)
ALBUMIN/GLOB SERPL: 1.4 G/DL
ALP SERPL-CCNC: 91 U/L (ref 39–117)
ALT SERPL-CCNC: 10 U/L (ref 1–33)
AST SERPL-CCNC: 16 U/L (ref 1–32)
BILIRUB SERPL-MCNC: 0.5 MG/DL (ref 0–1.2)
BUN SERPL-MCNC: 14 MG/DL (ref 8–23)
BUN/CREAT SERPL: 13.1 (ref 7–25)
CALCIUM SERPL-MCNC: 9.4 MG/DL (ref 8.6–10.5)
CHLORIDE SERPL-SCNC: 104 MMOL/L (ref 98–107)
CHOLEST SERPL-MCNC: 199 MG/DL (ref 0–200)
CHOLEST/HDLC SERPL: 3.26 {RATIO}
CO2 SERPL-SCNC: 26.4 MMOL/L (ref 22–29)
CREAT SERPL-MCNC: 1.07 MG/DL (ref 0.57–1)
EGFRCR SERPLBLD CKD-EPI 2021: 53.3 ML/MIN/1.73
GLOBULIN SER CALC-MCNC: 3 GM/DL
GLUCOSE SERPL-MCNC: 92 MG/DL (ref 65–99)
HDLC SERPL-MCNC: 61 MG/DL (ref 40–60)
LDLC SERPL CALC-MCNC: 115 MG/DL (ref 0–100)
POTASSIUM SERPL-SCNC: 3.9 MMOL/L (ref 3.5–5.2)
PROT SERPL-MCNC: 7.3 G/DL (ref 6–8.5)
SODIUM SERPL-SCNC: 138 MMOL/L (ref 136–145)
TRIGL SERPL-MCNC: 132 MG/DL (ref 0–150)
VLDLC SERPL CALC-MCNC: 23 MG/DL (ref 5–40)

## 2022-11-29 LAB
BASOPHILS # BLD AUTO: 0.05 10*3/MM3 (ref 0–0.2)
BASOPHILS NFR BLD AUTO: 0.8 % (ref 0–1.5)
EOSINOPHIL # BLD AUTO: 0.14 10*3/MM3 (ref 0–0.4)
EOSINOPHIL NFR BLD AUTO: 2.1 % (ref 0.3–6.2)
ERYTHROCYTE [DISTWIDTH] IN BLOOD BY AUTOMATED COUNT: 11.9 % (ref 12.3–15.4)
HCT VFR BLD AUTO: 38.8 % (ref 34–46.6)
HGB BLD-MCNC: 13 G/DL (ref 12–15.9)
IMM GRANULOCYTES # BLD AUTO: 0.03 10*3/MM3 (ref 0–0.05)
IMM GRANULOCYTES NFR BLD AUTO: 0.5 % (ref 0–0.5)
LYMPHOCYTES # BLD AUTO: 1.27 10*3/MM3 (ref 0.7–3.1)
LYMPHOCYTES NFR BLD AUTO: 19.3 % (ref 19.6–45.3)
MCH RBC QN AUTO: 30.2 PG (ref 26.6–33)
MCHC RBC AUTO-ENTMCNC: 33.5 G/DL (ref 31.5–35.7)
MCV RBC AUTO: 90.2 FL (ref 79–97)
MONOCYTES # BLD AUTO: 0.56 10*3/MM3 (ref 0.1–0.9)
MONOCYTES NFR BLD AUTO: 8.5 % (ref 5–12)
NEUTROPHILS # BLD AUTO: 4.53 10*3/MM3 (ref 1.7–7)
NEUTROPHILS NFR BLD AUTO: 68.8 % (ref 42.7–76)
NRBC BLD AUTO-RTO: 0 /100 WBC (ref 0–0.2)
PLATELET # BLD AUTO: 244 10*3/MM3 (ref 140–450)
RBC # BLD AUTO: 4.3 10*6/MM3 (ref 3.77–5.28)
THYROPEROXIDASE AB SERPL-ACNC: <9 IU/ML (ref 0–34)
TSH SERPL DL<=0.005 MIU/L-ACNC: 0.25 UIU/ML (ref 0.45–4.5)
WBC # BLD AUTO: 6.58 10*3/MM3 (ref 3.4–10.8)

## 2022-11-30 LAB — THYROGLOB AB SERPL-ACNC: <1 IU/ML (ref 0–0.9)

## 2022-12-01 PROBLEM — K62.5 RECTAL BLEEDING: Status: RESOLVED | Noted: 2022-02-24 | Resolved: 2022-12-01

## 2022-12-01 PROBLEM — S72.012A CLOSED SUBCAPITAL FRACTURE OF LEFT FEMUR (HCC): Status: RESOLVED | Noted: 2017-12-26 | Resolved: 2022-12-01

## 2022-12-01 NOTE — PROGRESS NOTES
"Subsequent Medicare Wellness Visit     Gloria Álvarez is a 76 y.o. female who presents for a Subsequent Medicare Wellness Visit.    Compared to one year ago, the patient feels her physical health is worse -- she's going through treatment for papillary cancer and her mental health is the same.  Recent Hospitalizations:  She was not admitted to the hospital during the last year.     Current Medical Providers:  Patient Care Team:  Jess Holden MD as PCP - General (Family Medicine)  No opioid medication identified on active medication list. I have reviewed chart for other potential  high risk medication/s and harmful drug interactions in the elderly.    Aspirin is not on active medication list.  Aspirin use is not indicated based on review of current medical condition/s. Risk of harm outweighs potential benefits.  .  Advance Care Planning   Advance Directive is on file.  ACP discussion was held with the patient during this visit. Patient has an advance directive in EMR which is still valid.      Vitals:    12/02/22 1016   BP: 138/62   Pulse: 80   Resp: 18   SpO2: 98%   Weight: 70.8 kg (156 lb)   Height: 162.6 cm (64\")      Estimated body mass index is 26.78 kg/m² as calculated from the following:    Height as of this encounter: 162.6 cm (64\").    Weight as of this encounter: 70.8 kg (156 lb).  BMI is >= 25 and <30. (Overweight) The following options were offered after discussion;: none (medical contraindication)     Does the patient have evidence of cognitive impairment? No  HEALTH RISK ASSESSMENT  Smoking Status:  Social History     Tobacco Use   Smoking Status Never   Smokeless Tobacco Never     Alcohol Consumption:  Social History     Substance and Sexual Activity   Alcohol Use Never     Fall Risk Screen:  STEADI Fall Risk Assessment was completed, and patient is at LOW risk for falls.Assessment completed on:12/2/2022  Depression Screening:  PHQ-2/PHQ-9 Depression Screening 12/2/2022   Retired PHQ-9 Total " Score -   Retired Total Score -   Little Interest or Pleasure in Doing Things 0-->not at all   Feeling Down, Depressed or Hopeless 0-->not at all   PHQ-9: Brief Depression Severity Measure Score 0     Health Habits and Functional and Cognitive Screening:  Functional & Cognitive Status 12/2/2022   Do you have difficulty preparing food and eating? No   Do you have difficulty bathing yourself, getting dressed or grooming yourself? No   Do you have difficulty using the toilet? No   Do you have difficulty moving around from place to place? No   Do you have trouble with steps or getting out of a bed or a chair? No   Current Diet Well Balanced Diet   Dental Exam Up to date   Eye Exam Up to date   Exercise (times per week) 4 times per week   Current Exercises Include (No Data)        Exercise Comment silver sneakers exercises   Current Exercise Activities Include -   Do you need help using the phone?  No   Are you deaf or do you have serious difficulty hearing?  No   Do you need help with transportation? No   Do you need help shopping? No   Do you need help preparing meals?  No   Do you need help with housework?  No   Do you need help with laundry? No   Do you need help taking your medications? No   Do you need help managing money? No   Do you ever drive or ride in a car without wearing a seat belt? No   Have you felt unusual stress, anger or loneliness in the last month? No   Who do you live with? Spouse   If you need help, do you have trouble finding someone available to you? No   Have you been bothered in the last four weeks by sexual problems? No   Do you have difficulty concentrating, remembering or making decisions? No       Health Habits  Current Diet: Well Balanced Diet  Dental Exam: Up to date  Eye Exam: Up to date  Exercise (times per week): 4 times per week  Current Exercises Include:  (silver sneakers exercises)  Age-appropriate Screening Schedule:  Refer to the list below for future screening recommendations  based on patient's age, sex and/or medical conditions. Orders for these recommended tests are listed in the plan section. The patient has been provided with a written plan.  Health Maintenance   Topic Date Due   • DXA SCAN  12/05/2021   • LIPID PANEL  11/28/2023   • MAMMOGRAM  03/15/2024   • TDAP/TD VACCINES (2 - Td or Tdap) 09/07/2028   • INFLUENZA VACCINE  Completed        CMS Preventative Services Quick Reference  Risk Factors Identified During Encounter  In treatment for papillary thyroid cancer. Just watching.   The above risks/problems have been discussed with the patient.  Follow up actions/plans if indicated are seen below in the Assessment/Plan Section.  Pertinent information has been shared with the patient in the After Visit Summary.  Patient Instructions        Follow Up: Medicare visit in one year  An After Visit Summary and PPPS were given/sent to the patient.    Patient has multiple medical problems which are significant and separately identifiable that require additional work above and beyond the Medicare Wellness Visit. These are not trivial or insignificant and are billed with a 25-modifier    Cyst under arm is resolving. Healing fine.

## 2022-12-02 ENCOUNTER — OFFICE VISIT (OUTPATIENT)
Dept: FAMILY MEDICINE CLINIC | Facility: CLINIC | Age: 77
End: 2022-12-02

## 2022-12-02 VITALS
HEIGHT: 64 IN | BODY MASS INDEX: 26.63 KG/M2 | HEART RATE: 80 BPM | RESPIRATION RATE: 18 BRPM | WEIGHT: 156 LBS | OXYGEN SATURATION: 98 % | DIASTOLIC BLOOD PRESSURE: 62 MMHG | SYSTOLIC BLOOD PRESSURE: 138 MMHG

## 2022-12-02 DIAGNOSIS — E78.2 MIXED HYPERLIPIDEMIA: ICD-10-CM

## 2022-12-02 DIAGNOSIS — Z00.00 MEDICARE ANNUAL WELLNESS VISIT, SUBSEQUENT: Primary | ICD-10-CM

## 2022-12-02 PROCEDURE — G0439 PPPS, SUBSEQ VISIT: HCPCS | Performed by: FAMILY MEDICINE

## 2022-12-02 PROCEDURE — 1160F RVW MEDS BY RX/DR IN RCRD: CPT | Performed by: FAMILY MEDICINE

## 2022-12-02 PROCEDURE — 96160 PT-FOCUSED HLTH RISK ASSMT: CPT | Performed by: FAMILY MEDICINE

## 2022-12-02 PROCEDURE — 1170F FXNL STATUS ASSESSED: CPT | Performed by: FAMILY MEDICINE

## 2022-12-02 RX ORDER — ASPIRIN 81 MG/1
81 TABLET, CHEWABLE ORAL DAILY
COMMUNITY
End: 2023-03-14

## 2022-12-23 DIAGNOSIS — I10 PRIMARY HYPERTENSION: ICD-10-CM

## 2022-12-24 RX ORDER — LISINOPRIL 10 MG/1
TABLET ORAL
Qty: 90 TABLET | Refills: 1 | Status: SHIPPED | OUTPATIENT
Start: 2022-12-24

## 2023-03-16 ENCOUNTER — APPOINTMENT (OUTPATIENT)
Dept: WOMENS IMAGING | Facility: HOSPITAL | Age: 78
End: 2023-03-16
Payer: MEDICARE

## 2023-03-16 PROCEDURE — 77067 SCR MAMMO BI INCL CAD: CPT | Performed by: RADIOLOGY

## 2023-03-16 PROCEDURE — 77063 BREAST TOMOSYNTHESIS BI: CPT | Performed by: RADIOLOGY

## 2023-03-20 ENCOUNTER — OFFICE VISIT (OUTPATIENT)
Dept: FAMILY MEDICINE CLINIC | Facility: CLINIC | Age: 78
End: 2023-03-20
Payer: MEDICARE

## 2023-03-20 VITALS
WEIGHT: 156 LBS | HEART RATE: 69 BPM | RESPIRATION RATE: 18 BRPM | OXYGEN SATURATION: 99 % | SYSTOLIC BLOOD PRESSURE: 138 MMHG | BODY MASS INDEX: 26.63 KG/M2 | HEIGHT: 64 IN | DIASTOLIC BLOOD PRESSURE: 68 MMHG

## 2023-03-20 DIAGNOSIS — S20.221A CONTUSION OF RIGHT BACK WALL OF THORAX, INITIAL ENCOUNTER: Primary | ICD-10-CM

## 2023-03-20 PROCEDURE — 99212 OFFICE O/P EST SF 10 MIN: CPT | Performed by: FAMILY MEDICINE

## 2023-03-20 NOTE — PROGRESS NOTES
Assessment and Plan     Problem List Items Addressed This Visit    None  Visit Diagnoses     Contusion of right back wall of thorax, initial encounter    -  Primary    Keep walking -- treadmill and track at the gym. 30 min three times.         Patient was given instructions and counseling regarding her condition or for health maintenance advice. Please see specific information pulled into the AVS if appropriate.        Gloria is a 77 y.o. being seen today for  Fall (Urgent care follow up from stool fall )   Subjective   History of the Present Illness   She hit her back and her hip on the door and her ribs. Went to the [facility] and had an xray. Sees the oncologist and gets a CT scan in June. Didn't break anything. Answers for HPI/ROS submitted by the patient on 3/20/2023  Please describe your symptoms.: Follow up regarding x-rays done on 3-14-23 at [facility].  Report from Urgent Care doctor was to be faxed to Dr. Holden on 3-17-23. My concern is that there is suspicion of a very tiny left pleural  effusion.  Have you had these symptoms before?: No  How long have you been having these symptoms?: 5-7 days  Please list any medications you are currently taking for this condition.: Tylenol 500 mg  Please describe any probable cause for these symptoms. : 3-,  I slipped off of a very low vanity stool.  The stool flipped over thus throwing me into the door frames.    My right butt cheek hit one door frame, my left rib area hit the other door frame.  I never hit my spine or head.  I was able to get up by myself.  It hurt immediately and bruising was very quick.  I felt by Tuesday, 3- that I needed to be x-rayed and checked out.  Dr. Wolf Burrell examined me and had the x-rays done.  He also read the results to say no breaks/cracks.  I am still sore and bruised.    Social History  She  reports that she has never smoked. She has never used smokeless tobacco. She reports that she does not drink  alcohol and does not use drugs.  Objective   Vital Signs        BP Readings from Last 1 Encounters:   03/20/23 138/68     Wt Readings from Last 3 Encounters:   03/20/23 70.8 kg (156 lb)   03/14/23 70.8 kg (156 lb)   12/02/22 70.8 kg (156 lb)   Body mass index is 26.78 kg/m².     Physical Exam  Vitals reviewed.   Constitutional:       Appearance: Normal appearance. She is well-developed.   Skin:     Comments: Bruising on the right gluteal skin area.    Neurological:      Mental Status: She is alert.   Psychiatric:         Behavior: Behavior normal.         Thought Content: Thought content normal.         Judgment: Judgment normal.

## 2023-03-28 ENCOUNTER — PATIENT MESSAGE (OUTPATIENT)
Dept: FAMILY MEDICINE CLINIC | Facility: CLINIC | Age: 78
End: 2023-03-28
Payer: MEDICARE

## 2023-03-28 DIAGNOSIS — J30.1 SEASONAL ALLERGIC RHINITIS DUE TO POLLEN: Primary | ICD-10-CM

## 2023-03-28 RX ORDER — FLUTICASONE PROPIONATE 50 MCG
2 SPRAY, SUSPENSION (ML) NASAL DAILY
Qty: 33.3 ML | Refills: 3 | Status: SHIPPED | OUTPATIENT
Start: 2023-03-28

## 2023-03-28 NOTE — TELEPHONE ENCOUNTER
From: Gloria Álvarez  To: Jess Holden  Sent: 3/28/2023 11:02 AM EDT  Subject: Flonaise Allergy Relief Spray    I have been experiencing nasal congestion, watery eyes and some sneezing since trees and flowers are blooming. Checking with you if Flonaise would be a good nasal spray that is okay to use with the medications I currently take. If so, would you please foward a prescription to [facility] for this, (formerly [facility]) but still through [employer]. Phone # [phone]. They would provide a generic (Fluticasone Propionate Nasal Spray) at no cost to me.   Following up from my visit with you, my back and butt are feeling better.   As always, thank you for your help and concern.

## 2023-05-20 NOTE — PROGRESS NOTES
[facility] Internal Medicine Progress Note      Subjective    Pt reports feeling ok. He has had no recurrence of symptoms that brought him to the hospital. He is very worried about his heart and is frustrated that he has developed so many problems after his knee surgery last month. Denies CP, SOB, abd pain, n/v/f/d/c      Medications  Current Facility-Administered Medications   Medication Dose Route Frequency Provider Last Rate Last Admin   • labetalol (NORMODYNE) injection 10 mg  10 mg Intravenous Q5 Min PRN Liliana Fu MD       • sodium chloride 0.9 % flush bag 25 mL  25 mL Intravenous PRN Liliana Fu MD       • sodium chloride (PF) 0.9 % injection 2 mL  2 mL Intracatheter 2 times per day Liliana Fu MD   2 mL at 05/19/23 2014   • sodium chloride (NORMAL SALINE) 0.9 % bolus 500 mL  500 mL Intravenous PRN Alison Hu CNP              Vitals:     Vitals with min/max:      Vital Last Value 24 Hour Range   Temperature 97.5 °F (36.4 °C) (05/20/23 0749) Temp  Min: 97.2 °F (36.2 °C)  Max: 98.6 °F (37 °C)   Pulse 70 (05/20/23 0749) Pulse  Min: 43  Max: 105   Respiratory 16 (05/20/23 0749) Resp  Min: 12  Max: 20   Non-Invasive  Blood Pressure 131/71 (05/20/23 0749) BP  Min: 110/70  Max: 144/93   Pulse Oximetry 98 % (05/20/23 0749) SpO2  Min: 98 %  Max: 100 %   Arterial   Blood Pressure   No data recorded       Intake/Output Summary (Last 24 hours) at 5/20/2023 1017  Last data filed at 5/19/2023 2014  Gross per 24 hour   Intake 2 ml   Output --   Net 2 ml        Physical Exam  Physical Exam  Vitals reviewed.   Constitutional:       General: He is not in acute distress.     Appearance: Normal appearance. He is normal weight. He is not ill-appearing or toxic-appearing.   HENT:      Head: Normocephalic and atraumatic.      Mouth/Throat:      Mouth: Mucous membranes are moist.      Neck: Normal range of motion and neck supple.   Eyes:      Extraocular Movements: Extraocular movements intact.       Gloria Álvarez : 1945 MRN: [id number] DATE: 2018    DIAGNOSIS: 8 week follow up left total hip     SUBJECTIVE:Patient returns today for 8 week follow up of left total hip replacement. Patient reports doing well with no unusual complaints. Appears to be progressing appropriately and is off a cane    OBJECTIVE:   Exam:. The incision is healed. No sign of infection. Range of motion is progressing as expected. The calf is soft and nontender with a negative Homans sign. Strength progressing    DIAGNOSTIC STUDIES  Xrays: 2 views of the left hip (AP pelvis and lateral left hip) were ordered and reviewed for evaluation of recent hip replacement. They demonstrate a well positioned, well aligned hip replacement without complicating factors noted. In comparison with previous films there has been interval implant placement.    ASSESSMENT: 8 week status post left hip replacement.    PLAN: 1) Activity as tolerated   2) Continue hip strengthening exercises    3) Follow up 1 year post-op with repeat Xrays of left hip (2views AP Pelvis      and lateral left hip)    Madie Lee, APRN  2018   Conjunctiva/sclera: Conjunctivae normal.      Pupils: Pupils are equal, round, and reactive to light.   Cardiovascular:      Rate and Rhythm: Normal rate and regular rhythm.      Pulses: Normal pulses.      Heart sounds: Normal heart sounds. No murmur heard.     No gallop.   Pulmonary:      Effort: Pulmonary effort is normal. No respiratory distress.      Breath sounds: Normal breath sounds. No wheezing or rales.   Abdominal:      General: Abdomen is flat. Bowel sounds are normal. There is no distension.      Palpations: Abdomen is soft.      Tenderness: There is no abdominal tenderness. There is no guarding.   Musculoskeletal:         General: No swelling.   Skin:     General: Skin is warm and dry.      Capillary Refill: Capillary refill takes less than 2 seconds.   Neurological:      General: No focal deficit present.      Mental Status: He is alert and oriented to person, place, and time.   Psychiatric:         Mood and Affect: Mood normal.         Behavior: Behavior normal.         Thought Content: Thought content normal.         Judgment: Judgment normal.           Imaging  CTA HEAD AND NECK    Result Date: 5/20/2023  EXAM TYPE: CTA HEAD AND NECK CLINICAL INDICATION: Right cerebral hemorrhage COMPARISON: None TECHNIQUE: Thin section multidetector CT scanning was performed through the neck and head following high rate bolus IV contrast infusion per CT angiography protocol.  MPR and MIP reconstructions were performed.  3-D reconstructions were performed on an independent workstation.  NASCET criteria was utilized to determine the degree of cervical internal carotid artery stenosis.  100 mL of Omnipaque 300 was injected intravenously. Adjustment of the mA and/or kV was done according to the patients size. FINDINGS: There is smaller space opacity within the superior segment of the right lower lobe. There is no stenosis of the great vessels at their origins from the aortic arch.  There is no common or internal  carotid artery stenosis bilaterally. The left vertebral artery is dominant.  There is no vertebral artery stenosis bilaterally.  There is no cervical arterial dissection. There is no intracranial aneurysm.  There is no high flow vascular malformation.  Punctate hyperdensity within the right superior frontal white matter seen on noncontrast CT is visualized (series 3 image 258, series 604b image 95, series 603b image 109).  The hyperdensity does not appear to be associated with a developmental venous anomaly.  There are no enlarged arterial vessels.  There is no evidence of cortical venous thrombosis or dural sinus thrombosis. There is no intracranial arterial embolus or large vessel occlusion.  There is no intracranial arterial stenosis.     1.  There is no intracranial aneurysm or high flow vascular malformation. Punctate hyperdensity within the right superior frontal white matter may be related to a low-flow vascular malformation, although it does not appear to be related to a developmental venous anomaly.  Correlation with MRI of the brain without and with contrast is recommended for further evaluation. 2.  Normal CT angiography of the neck. 3.  Normal CT angiography of the head. Electronically Signed by: NICOLE REDDY M.D. Signed on: 5/20/2023 2:16 AM Workstation ID: [id number]    CT HEAD WO CONTRAST    Result Date: 5/20/2023  EXAM: CT HEAD WO CONTRAST CLINICAL INDICATION: Intracranial hemorrhage COMPARISON:  05/19/2023 at 1751 hours TECHNIQUE: Multiple axial sections were performed through the brain at 05/20/2023 at 0047 hours. mA and/or kVp was adjusted for patient size. FINDINGS: The ventricles are normal in size.  There is persistent punctate hyperdensity within the right superior frontal white matter above the level of the lateral ventricles, unchanged compared to prior exam.  There is no new hyperdensity.  There is normal gray-white matter differentiation. There is no evidence of acute infarction.   There is no evidence of intracranial mass.  There is no herniation.  The basilar cisterns are normally visualized.  There is no paranasal sinus or mastoid opacification.      Stable punctate hyperdensity within the right superior frontal white matter compared to earlier exam.  This may be related to low flow vascular malformation rather than acute hemorrhage.  Correlation with MRI of the brain is recommended for further evaluation.  Otherwise normal CT scan of the brain. Electronically Signed by: NICOLE REDDY M.D. Signed on: 5/20/2023 2:08 AM Workstation ID: [id number]    CT HEAD WO CONTRAST    Result Date: 5/19/2023  EXAM: Noncontrast CT examination of the brain CLINICAL INDICATION: Headache. TECHNIQUE: Multiple axial images were obtained of the brain without the infusion of intravenous contrast. Adjustment of the MA and/or KV was done according to the patient's size. COMPARISON: None. FINDINGS: There is minimal right high convexity periventricular white matter high density subarachnoid hemorrhage.  Cortical sulci and ventricles are normal in appearance and there is no midline shift or mass effect. There are no additional abnormal collections of intra or extra-axial blood or fluid. There is no CT evidence of additional intracranial hemorrhage, mass, or acute infarction. The skull base and overlying calvarium are normal. The mastoid air cells and paranasal sinuses are well aerated.     1.  Minimal right cerebral subarachnoid hemorrhage. The results were discussed with Dr. Hollingsworth on 05/19/2023 at approximately 6:00 PM. Electronically Signed by: ARNULFO DAWSON M.D. Signed on: 5/19/2023 6:04 PM Workstation ID: [id number]    XR CHEST PA AND LATERAL 2 VIEWS    Result Date: 5/19/2023  EXAM:Two view PA and lateral chest radiographs CLINICAL INDICATION: Chest pain. COMPARISON: None. FINDINGS: The cardiac silhouette and pulmonary vessels are unremarkable. There is right upper lobe airspace opacification.  There  is no pneumothorax and the osseous structures are unremarkable.     1.  Minimal right upper lobe airspace opacification. Electronically Signed by: ARNULFO DAWSON M.D. Signed on: 5/19/2023 5:39 PM Workstation ID: [id number]    EKG (5/19): Sinus tachycardia, ventricular premature complex, LVH with secondary STT changes, probably non-pathological Q waves in inferior leads.     EKG (5/19): SR, Abnormal R-wave progression early transition      Labs   Recent Labs   Lab 05/20/23  0521 05/19/23  1707 05/19/23  1705   WBC 4.7  --  5.9   HCT 39.5  --  43.2   HGB 13.2  --  14.4     --  231   INR  --  1.0  --    PTT  --  28  --    SODIUM 139  --  137   POTASSIUM 3.6  --  3.6   CHLORIDE 108  --  107   CO2 25  --  24   CALCIUM 8.8  --  9.1   GLUCOSE 100*  --  109*   BUN 11  --  15   CREATININE 0.96  --  0.91   AST 18  --  32   GPT 28  --  34   ALKPT 66  --  86   BILIRUBIN 1.1*  --  0.8   ALBUMIN 3.0*  --  3.6     Lipid Panel (5/20): Cholesterol 222, Calculated     Assessment and Plan:    #NSTEMI type 2 2/2 uncontrolled HR  #Palpitations  #presyncope  - Imaging as above   - Troponin - 680->1089->678   - cardiology consulted   - TTE pending   - Lexiscan pending   - Monitor telemetry    - holding ASA/eliquis; restart AC as soon as cleared by neurosurgery   - plan for 30d event monitor at discharge               R/o Subarachnoid hematoma -patient on Eliquis due to recent PE   - Neurosurgery consulted   - Continue Labetalol   - Maintain seizure precautions   - continue serial                Hyperbilirubinemia - T.Bili as above     Recent history of Multiple bilateral PEs without right heart strain, Provoked (04/13)  - S/p heparin drip  - Discharged home on Eliquis-holding Eliquis due to the above   - Strict I/O's and weights   - Hematology consulted   - Monitor closely     Recent meniscus tear - S/p repair, no acute symptoms      DIET: Cardiac  DVT PROPHYLAXIS: SCDs    Code Status: Full  Resuscitation    Disposition:  Pending clinical improvement and specialist input  BASSAM: 5/22     Primary Care Physician  Calos Arellano MD        All patient questions answered  All labs and imaging reviewed    Charting performed by tila Phoenix for Dr. Analilia Quick      All medical record entries made by the berthaibwilli were at my direction. I have reviewed the chart and agree that the record accurately reflects my personal performance of the history, physical exam, hospital course, and assessment and plan.

## 2023-08-22 ENCOUNTER — OFFICE VISIT (OUTPATIENT)
Dept: CARDIOLOGY | Facility: CLINIC | Age: 78
End: 2023-08-22
Payer: MEDICARE

## 2023-08-22 VITALS
HEART RATE: 83 BPM | WEIGHT: 155.2 LBS | HEIGHT: 64 IN | DIASTOLIC BLOOD PRESSURE: 72 MMHG | BODY MASS INDEX: 26.5 KG/M2 | SYSTOLIC BLOOD PRESSURE: 128 MMHG

## 2023-08-22 DIAGNOSIS — C73 PAPILLARY CARCINOMA OF THYROID: ICD-10-CM

## 2023-08-22 DIAGNOSIS — E78.2 MIXED HYPERLIPIDEMIA: Primary | ICD-10-CM

## 2023-08-22 PROCEDURE — 93000 ELECTROCARDIOGRAM COMPLETE: CPT | Performed by: INTERNAL MEDICINE

## 2023-08-22 PROCEDURE — 99213 OFFICE O/P EST LOW 20 MIN: CPT | Performed by: INTERNAL MEDICINE

## 2023-08-22 RX ORDER — RESPIRATORY SYNCYTIAL VIRUS VACCINE 120MCG/0.5
0.5 KIT INTRAMUSCULAR ONCE
COMMUNITY
Start: 2023-08-11

## 2023-08-22 NOTE — PROGRESS NOTES
Subjective:     Encounter Date:08/22/23      Patient ID: Gloria Álvarez is a 77 y.o. female.    Chief Complaint:  History of Present Illness    Dear Dr. Ravi,    I had the pleasure of seeing this patient in the office today for 1 year follow-up.  She was referred last year for borderline EKG changes.    Fortunately she had a stress test at that time that was normal.  No ischemia seen, normal function.  Serial CT scans for her thyroid carcinoma likewise if showed no coronary calcification.  She had another CT scan in June that likewise showed no coronary artery calcification.    She denies any chest pain, pressure, tightness, squeezing, or heartburn.  She has not experienced any feeling of palpitations, tachycardia or heart racing and no presyncope or syncope.  There have not been any problems with dizziness or lightheadedness.  There have not been any orthopnea or PND, and no problems with lower extremity edema.  She denies any shortness of breath at rest or with activity and has not had any wheezing.  She has not had any problems with unexplained nausea or vomiting. She has continued to perform daily activities of living without any specific problem or change in the level of activity.  She has not been recently hospitalized for any reason.    The following portions of the patient's history were reviewed and updated as appropriate: allergies, current medications, past family history, past medical history, past social history, past surgical history and problem list.      ECG 12 Lead    Date/Time: 8/22/2023 9:35 AM  Performed by: Duncan Malik III, MD  Authorized by: Duncan Malik III, MD   Comparison: compared with previous ECG   Similar to previous ECG  Rhythm: sinus rhythm  Rate: normal  Conduction: conduction normal  QRS axis: normal  Other findings: non-specific ST-T wave changes    Clinical impression: normal ECG           Objective:     Vitals:    08/22/23 0926   BP: 128/72   BP Location: Left arm  "  Patient Position: Sitting   Pulse: 83   Weight: 70.4 kg (155 lb 3.2 oz)   Height: 162.6 cm (64\")     Body mass index is 26.64 kg/mý.      Vitals reviewed.   Constitutional:       General: Not in acute distress.     Appearance: Well-developed. Not diaphoretic.   Eyes:      General:         Right eye: No discharge.         Left eye: No discharge.      Conjunctiva/sclera: Conjunctivae normal.   HENT:      Head: Normocephalic and atraumatic.      Nose: Nose normal.   Neck:      Thyroid: No thyromegaly.      Trachea: No tracheal deviation.   Pulmonary:      Effort: Pulmonary effort is normal. No respiratory distress.      Breath sounds: Normal breath sounds. No stridor.   Chest:      Chest wall: Not tender to palpatation.   Cardiovascular:      Normal rate. Regular rhythm.      Murmurs: There is no murmur.      . No S3 gallop. No click. No rub.   Pulses:     Intact distal pulses.   Edema:     Peripheral edema absent.   Abdominal:      General: Bowel sounds are normal. There is no distension.      Palpations: Abdomen is soft. There is no abdominal mass.   Musculoskeletal: Normal range of motion.         General: No tenderness or deformity.      Cervical back: Normal range of motion and neck supple. Skin:     General: Skin is warm and dry.      Findings: No erythema or rash.   Neurological:      Mental Status: Alert.   Psychiatric:         Attention and Perception: Attention normal.       Data and records reviewed:     Lab Results   Component Value Date    GLUCOSE 92 11/28/2022    BUN 14 11/28/2022    CREATININE 1.07 (H) 11/28/2022    EGFRIFNONA 66 06/30/2021    EGFRIFAFRI 80 06/30/2021    BCR 13.1 11/28/2022    K 3.9 11/28/2022    CO2 26.4 11/28/2022    CALCIUM 9.4 11/28/2022    PROTENTOTREF 7.3 11/28/2022    ALBUMIN 4.30 11/28/2022    LABIL2 1.4 11/28/2022    AST 16 11/28/2022    ALT 10 11/28/2022     No results found for: CHOL  Lab Results   Component Value Date    TRIG 132 11/28/2022    TRIG 113 06/30/2021    TRIG " 128 09/21/2020     Lab Results   Component Value Date    HDL 61 (H) 11/28/2022    HDL 58 06/30/2021    HDL 54 09/21/2020     Lab Results   Component Value Date     (H) 11/28/2022    LDL 88 06/30/2021    LDL 84 09/21/2020     Lab Results   Component Value Date    VLDL 23 11/28/2022    VLDL 20 06/30/2021    VLDL 26 09/21/2020     No results found for: LDLHDL  CBC          9/3/2022    06:51 11/28/2022    09:10 6/28/2023    13:27   CBC   WBC 6.83  6.58  7.88       RBC 4.55  4.30  4.41       Hemoglobin 13.7  13.0  13.2       Hematocrit 41.4  38.8  39.0       MCV 91.0  90.2  88.4       MCH 30.1  30.2  29.9       MCHC 33.1  33.5  33.8       RDW 12.1  11.9  11.8       Platelets 207  244  223          Details          This result is from an external source.             No radiology results for the last 90 days.    Stress test November 2022:    Interpretation Summary         Left ventricular ejection fraction is normal (Calculated EF = 67%).    Myocardial perfusion imaging indicates a normal myocardial perfusion study with no evidence of ischemia.    Impressions are consistent with a low risk study.        Assessment:          Diagnosis Plan   1. Mixed hyperlipidemia  ECG 12 Lead      2. Papillary carcinoma of thyroid  ECG 12 Lead             Plan:       1.  Borderline EKG, no evidence of CAD, no CP, no coronary calcium seen on multiple CT scans, normal stress test.  No evidence of any coronary cardiac pathology  2.  Papillary carcinoma thyroid, serial CT scans reviewed    No active cardiac issues, we will follow-up on an as-needed basis.    Thank you very much for allowing us to participate in the care of this pleasant patient.  Please don't hesitate to call if I can be of assistance in any way.      Current Outpatient Medications:     Abrysvo 120 MCG/0.5ML reconstituted solution injection, Inject 0.5 mL into the appropriate muscle as directed by prescriber 1 (One) Time., Disp: , Rfl:     Calcium Carbonate-Vit D-Min  (CALTRATE 600+D PLUS MINERALS) 600-800 MG-UNIT chewable tablet, Chew 1 tablet Daily., Disp: , Rfl:     Cholecalciferol (VITAMIN D3) 1000 units capsule, Take  by mouth., Disp: , Rfl:     fluticasone (FLONASE) 50 MCG/ACT nasal spray, 2 sprays into the nostril(s) as directed by provider Daily., Disp: 33.3 mL, Rfl: 3    levothyroxine (SYNTHROID, LEVOTHROID) 137 MCG tablet, Take 1 tablet by mouth Daily. Dr. Horton is controlling, Disp: , Rfl:     lisinopril (PRINIVIL,ZESTRIL) 10 MG tablet, TAKE 1 TABLET EVERY DAY, Disp: 90 tablet, Rfl: 1    nitrofurantoin, macrocrystal-monohydrate, (MACROBID) 100 MG capsule, Take 1 capsule by mouth 2 (Two) Times a Day., Disp: 14 capsule, Rfl: 0    omeprazole (priLOSEC) 20 MG capsule, Take 1 capsule by mouth Daily., Disp: 90 capsule, Rfl: 3    triamcinolone (KENALOG) 0.1 % cream, Apply 1 application  topically to the appropriate area as directed 2 (Two) Times a Day., Disp: 45 g, Rfl: 0    vitamin E 400 UNIT capsule, Take 1 capsule by mouth Daily., Disp: , Rfl:          No follow-ups on file.

## 2023-08-28 DIAGNOSIS — J30.1 SEASONAL ALLERGIC RHINITIS DUE TO POLLEN: ICD-10-CM

## 2023-08-28 RX ORDER — FLUTICASONE PROPIONATE 50 MCG
SPRAY, SUSPENSION (ML) NASAL
Qty: 48 G | Refills: 2 | Status: SHIPPED | OUTPATIENT
Start: 2023-08-28

## 2023-09-06 DIAGNOSIS — I10 PRIMARY HYPERTENSION: ICD-10-CM

## 2023-09-06 DIAGNOSIS — K21.9 GASTROESOPHAGEAL REFLUX DISEASE WITHOUT ESOPHAGITIS: ICD-10-CM

## 2023-09-06 RX ORDER — LISINOPRIL 10 MG/1
TABLET ORAL
Qty: 90 TABLET | Refills: 1 | Status: SHIPPED | OUTPATIENT
Start: 2023-09-06

## 2023-09-06 NOTE — TELEPHONE ENCOUNTER
Patient has osteoporosis and it is not recommended that patients with osteoporosis take proton pump inhibitors.  I do not even see a diagnosis as to why she is on the medication (omeprazole).  I need to know if she would be willing to try something like prescription Pepcid rather than omeprazole.

## 2023-09-07 RX ORDER — FAMOTIDINE 40 MG/1
40 TABLET, FILM COATED ORAL DAILY
Qty: 90 TABLET | Refills: 1 | Status: SHIPPED | OUTPATIENT
Start: 2023-09-07

## 2023-11-15 ENCOUNTER — TELEPHONE (OUTPATIENT)
Dept: FAMILY MEDICINE CLINIC | Facility: CLINIC | Age: 78
End: 2023-11-15

## 2023-11-15 NOTE — TELEPHONE ENCOUNTER
ANDRA WITH [facility] CALLED FOR MEDICATION REFILL OF  OMEPRAZOLE 20 MG ONCE DAILY(NOT ON CURRENT MED LIST)    [facility] - [place], [place] - [address] - [phone]  - [phone]  [phone]

## 2023-11-27 RX ORDER — OMEPRAZOLE 20 MG/1
20 CAPSULE, DELAYED RELEASE ORAL DAILY
Qty: 90 CAPSULE | Refills: 3 | Status: SHIPPED | OUTPATIENT
Start: 2023-11-27 | End: 2023-11-28 | Stop reason: SDUPTHER

## 2023-11-28 RX ORDER — OMEPRAZOLE 20 MG/1
20 CAPSULE, DELAYED RELEASE ORAL DAILY
Qty: 90 CAPSULE | Refills: 3 | Status: SHIPPED | OUTPATIENT
Start: 2023-11-28

## 2024-03-03 ENCOUNTER — APPOINTMENT (OUTPATIENT)
Dept: CT IMAGING | Facility: HOSPITAL | Age: 79
End: 2024-03-03
Payer: MEDICARE

## 2024-03-03 ENCOUNTER — HOSPITAL ENCOUNTER (EMERGENCY)
Facility: HOSPITAL | Age: 79
Discharge: HOME | End: 2024-03-03
Attending: EMERGENCY MEDICINE | Admitting: EMERGENCY MEDICINE
Payer: MEDICARE

## 2024-03-03 VITALS
DIASTOLIC BLOOD PRESSURE: 73 MMHG | OXYGEN SATURATION: 100 % | HEART RATE: 73 BPM | WEIGHT: 153 LBS | SYSTOLIC BLOOD PRESSURE: 151 MMHG | TEMPERATURE: 97.4 F | RESPIRATION RATE: 18 BRPM | BODY MASS INDEX: 25.49 KG/M2 | HEIGHT: 65 IN

## 2024-03-03 DIAGNOSIS — K57.32 SIGMOID DIVERTICULITIS: Primary | ICD-10-CM

## 2024-03-03 DIAGNOSIS — R33.9 URINARY RETENTION: ICD-10-CM

## 2024-03-03 LAB
ALBUMIN SERPL-MCNC: 4.1 G/DL (ref 3.5–5.2)
ALBUMIN/GLOB SERPL: 1.4 G/DL
ALP SERPL-CCNC: 117 U/L (ref 39–117)
ALT SERPL W P-5'-P-CCNC: 10 U/L (ref 1–33)
ANION GAP SERPL CALCULATED.3IONS-SCNC: 12 MMOL/L (ref 5–15)
AST SERPL-CCNC: 18 U/L (ref 1–32)
BACTERIA UR QL AUTO: ABNORMAL /HPF
BASOPHILS # BLD AUTO: 0.03 10*3/MM3 (ref 0–0.2)
BASOPHILS NFR BLD AUTO: 0.3 % (ref 0–1.5)
BILIRUB SERPL-MCNC: 0.5 MG/DL (ref 0–1.2)
BILIRUB UR QL STRIP: NEGATIVE
BUN SERPL-MCNC: 9 MG/DL (ref 8–23)
BUN/CREAT SERPL: 10.6 (ref 7–25)
CALCIUM SPEC-SCNC: 9.4 MG/DL (ref 8.6–10.5)
CHLORIDE SERPL-SCNC: 104 MMOL/L (ref 98–107)
CLARITY UR: CLEAR
CO2 SERPL-SCNC: 25 MMOL/L (ref 22–29)
COLOR UR: YELLOW
CREAT SERPL-MCNC: 0.85 MG/DL (ref 0.57–1)
DEPRECATED RDW RBC AUTO: 39.9 FL (ref 37–54)
EGFRCR SERPLBLD CKD-EPI 2021: 69.4 ML/MIN/1.73
EOSINOPHIL # BLD AUTO: 0.08 10*3/MM3 (ref 0–0.4)
EOSINOPHIL NFR BLD AUTO: 0.7 % (ref 0.3–6.2)
ERYTHROCYTE [DISTWIDTH] IN BLOOD BY AUTOMATED COUNT: 12.3 % (ref 12.3–15.4)
GLOBULIN UR ELPH-MCNC: 3 GM/DL
GLUCOSE SERPL-MCNC: 105 MG/DL (ref 65–99)
GLUCOSE UR STRIP-MCNC: NEGATIVE MG/DL
HCT VFR BLD AUTO: 40.5 % (ref 34–46.6)
HGB BLD-MCNC: 13.5 G/DL (ref 12–15.9)
HGB UR QL STRIP.AUTO: NEGATIVE
HYALINE CASTS UR QL AUTO: ABNORMAL /LPF
IMM GRANULOCYTES # BLD AUTO: 0.04 10*3/MM3 (ref 0–0.05)
IMM GRANULOCYTES NFR BLD AUTO: 0.3 % (ref 0–0.5)
KETONES UR QL STRIP: NEGATIVE
LEUKOCYTE ESTERASE UR QL STRIP.AUTO: ABNORMAL
LIPASE SERPL-CCNC: 23 U/L (ref 13–60)
LYMPHOCYTES # BLD AUTO: 1.51 10*3/MM3 (ref 0.7–3.1)
LYMPHOCYTES NFR BLD AUTO: 13 % (ref 19.6–45.3)
MCH RBC QN AUTO: 29.5 PG (ref 26.6–33)
MCHC RBC AUTO-ENTMCNC: 33.3 G/DL (ref 31.5–35.7)
MCV RBC AUTO: 88.4 FL (ref 79–97)
MONOCYTES # BLD AUTO: 1.2 10*3/MM3 (ref 0.1–0.9)
MONOCYTES NFR BLD AUTO: 10.3 % (ref 5–12)
NEUTROPHILS NFR BLD AUTO: 75.4 % (ref 42.7–76)
NEUTROPHILS NFR BLD AUTO: 8.78 10*3/MM3 (ref 1.7–7)
NITRITE UR QL STRIP: NEGATIVE
NRBC BLD AUTO-RTO: 0 /100 WBC (ref 0–0.2)
PH UR STRIP.AUTO: 6 [PH] (ref 5–8)
PLATELET # BLD AUTO: 221 10*3/MM3 (ref 140–450)
PMV BLD AUTO: 9.2 FL (ref 6–12)
POTASSIUM SERPL-SCNC: 3.8 MMOL/L (ref 3.5–5.2)
PROT SERPL-MCNC: 7.1 G/DL (ref 6–8.5)
PROT UR QL STRIP: NEGATIVE
RBC # BLD AUTO: 4.58 10*6/MM3 (ref 3.77–5.28)
RBC # UR STRIP: ABNORMAL /HPF
REF LAB TEST METHOD: ABNORMAL
SODIUM SERPL-SCNC: 141 MMOL/L (ref 136–145)
SP GR UR STRIP: 1.02 (ref 1–1.03)
SQUAMOUS #/AREA URNS HPF: ABNORMAL /HPF
UROBILINOGEN UR QL STRIP: ABNORMAL
WBC # UR STRIP: ABNORMAL /HPF
WBC NRBC COR # BLD AUTO: 11.64 10*3/MM3 (ref 3.4–10.8)

## 2024-03-03 PROCEDURE — 83690 ASSAY OF LIPASE: CPT | Performed by: EMERGENCY MEDICINE

## 2024-03-03 PROCEDURE — 80053 COMPREHEN METABOLIC PANEL: CPT | Performed by: EMERGENCY MEDICINE

## 2024-03-03 PROCEDURE — 99285 EMERGENCY DEPT VISIT HI MDM: CPT

## 2024-03-03 PROCEDURE — 25510000001 IOPAMIDOL 61 % SOLUTION: Performed by: PHYSICIAN ASSISTANT

## 2024-03-03 PROCEDURE — 51798 US URINE CAPACITY MEASURE: CPT

## 2024-03-03 PROCEDURE — 81001 URINALYSIS AUTO W/SCOPE: CPT | Performed by: EMERGENCY MEDICINE

## 2024-03-03 PROCEDURE — 74177 CT ABD & PELVIS W/CONTRAST: CPT

## 2024-03-03 PROCEDURE — 85025 COMPLETE CBC W/AUTO DIFF WBC: CPT | Performed by: EMERGENCY MEDICINE

## 2024-03-03 RX ORDER — AMOXICILLIN AND CLAVULANATE POTASSIUM 875; 125 MG/1; MG/1
1 TABLET, FILM COATED ORAL 2 TIMES DAILY
Qty: 20 TABLET | Refills: 0 | Status: SHIPPED | OUTPATIENT
Start: 2024-03-03 | End: 2024-03-13

## 2024-03-03 RX ADMIN — IOPAMIDOL 85 ML: 612 INJECTION, SOLUTION INTRAVENOUS at 13:16

## 2024-03-03 NOTE — ED PROVIDER NOTES
EMERGENCY DEPARTMENT MD ATTESTATION NOTE    SHARED VISIT: This visit was performed by BOTH a physician and an APC. The substantive portion of the medical decision making was performed by this attesting physician who made or approved the management plan and takes responsibility for patient management. All studies documented in the APC note (if performed) were independently interpreted by me.    The L[facility] and I have discussed this patient's history, physical exam, MDM, and treatment plan.  I have reviewed the documentation and personally had a face to face interaction with the patient. The attached note describes my personal findings.        Room Number:  32/32  PCP: Jess Holden MD  Independent Historians: Patient    HPI:  A complete HPI/ROS/PMH/PSH/SH/FH are unobtainable due to: None    Chronic or social conditions impacting patient care (Social Determinants of Health): None      Context: Gloria Álvarez is a 78 y.o. female with a medical history of diverticulosis, ankle sprain, hypertension who presents to the ED c/o acute abdominal pain.  The patient reports that she developed left lower quadrant abdominal pain yesterday.  She states it is rare for her to have abdominal pain like this.  She denies any nausea or vomiting.  She reports chronic constipation for which she takes prune juice.  She states she has not had any diarrhea.  She denies fevers.  She has not take any medicine for her symptoms.  Nothing makes it worse or better.  She states the pain is severe.        Review of prior external notes (non-ED) -and- Review of prior external test results outside of this encounter:  Laboratory evaluation 1/4/2024 shows normal CBC    Prescription drug monitoring program review:     N/A      PHYSICAL EXAM    I have reviewed the triage vital signs and nursing notes.    ED Triage Vitals   Temp Heart Rate Resp BP SpO2   03/03/24 1154 03/03/24 1153 03/03/24 1153 03/03/24 1216 03/03/24 1153   97.4 °F (36.3 °C) 100 20  145/87 98 %      Temp src Heart Rate Source Patient Position BP Location FiO2 (%)   03/03/24 1154 -- -- -- --   Tympanic           Physical Exam  GENERAL: Awake, alert, no acute distress  SKIN: Warm, dry  HENT: Normocephalic, atraumatic  EYES: no scleral icterus  CV: regular rhythm, regular rate  RESPIRATORY: normal effort, lungs clear  ABDOMEN: soft, mild to moderate tenderness in the left lower quadrant, nondistended  MUSCULOSKELETAL: no deformity  NEURO: alert, moves all extremities, follows commands            MEDICATIONS GIVEN IN ER  Medications   iopamidol (ISOVUE-300) 61 % injection 85 mL (85 mL Intravenous Given 3/3/24 1316)         ORDERS PLACED DURING THIS VISIT:  Orders Placed This Encounter   Procedures    CT Abdomen Pelvis With Contrast    Comprehensive Metabolic Panel    Lipase    Urinalysis With Microscopic If Indicated (No Culture) - Urine, Clean Catch    CBC Auto Differential    Urinalysis, Microscopic Only - Urine, Clean Catch    Bladder scan    CBC & Differential         PROCEDURES  Procedures            PROGRESS, DATA ANALYSIS, CONSULTS, AND MEDICAL DECISION MAKING  All labs have been independently interpreted by me.  All radiology studies have been reviewed by me. All EKG's have been independently viewed and interpreted by me.  Discussion below represents my analysis of pertinent findings related to patient's condition, differential diagnosis, treatment plan and final disposition.    Differential diagnosis includes but is not limited to UTI, urinary retention, diverticulitis, colitis, pyelonephritis, kidney stone.    Clinical Scores:                   ED Course as of 03/03/24 1750   Sun Mar 03, 2024   1253 WBC(!): 11.64 [DC]   1253 Hemoglobin: 13.5 [DC]   1325 CT Abdomen Pelvis With Contrast  My independent interpretation of the imaging study is distended urinary bladder [TR]      ED Course User Index  [DC] Citlalli Ann PA  [TR] John Lynch MD       MDM: The patient has acute abdominal  pain in the setting of known diverticulosis.  Plan to obtain chemistries and blood counts as well as urinalysis.  We will obtain CT imaging of her abdomen pelvis to rule out acute pathology.  If she does have diverticulitis without complication then she should be able to be discharged home with oral antibiotics if her laboratory evaluation is reassuring.  If she has complications or if she has abnormal lab values then she will require admission.      COMPLEXITY OF CARE  Admission was considered but after careful review of the patient's presentation, physical examination, diagnostic results, and response to treatment the patient may be safely discharged with outpatient follow-up.    Please note that portions of this document were completed with a voice recognition program.    Note Disclaimer: At [facility], we believe that sharing information builds trust and better relationships. You are receiving this note because you recently visited [facility]. It is possible you will see health information before a provider has talked with you about it. This kind of information can be easy to misunderstand. To help you fully understand what it means for your health, we urge you to discuss this note with your provider.         John Lynch MD  03/03/24 [id number]

## 2024-03-03 NOTE — ED PROVIDER NOTES
EMERGENCY DEPARTMENT ENCOUNTER      PCP: Jess Holden MD  Patient Care Team:  Jess Holden MD as PCP - General (Family Medicine)   Independent Historians: Patient    HPI:  Chief Complaint: Abdominal pain  A complete HPI/ROS/PMH/PSH/SH/FH are unobtainable due to: None    Chronic or social conditions impacting patient care (social determinants of health): None    Context: Gloria Álvarez is a 78 y.o. female with history of papillary thyroid carcinoma, diverticulitis who presents to the ED from urgent care c/o acute left lower quadrant abdominal pain that began yesterday.  She denies any associated nausea, vomiting, diarrhea.  Last bowel movement was yesterday evening and was normal.  She denies any fevers, chills, urinary symptoms, cough, congestion or other complaints.    Review of prior external notes and/or external test results outside of this encounter: Chest abdomen pelvis on 12/20/2023 showed slight interval increase in measurement of multiple metastatic pulmonary nodules with continued short-term follow-up imaging recommended, no known metastatic disease.  Stable right adrenal gland nodule, no new findings of metastatic disease to the abdomen or pelvis.  There is mention of colonic diverticulosis.      PAST MEDICAL HISTORY  Active Ambulatory Problems     Diagnosis Date Noted    Papillary carcinoma of thyroid 05/24/2016    Atrophic vaginitis 05/24/2016    Chronic interstitial cystitis 05/24/2016    Hyperlipidemia 03/18/2016    Insomnia 10/02/2014    Postoperative hypothyroidism 10/01/2015    Hx of benign carcinoid tumor 07/05/2017    Hx of diverticulitis of colon 07/05/2017    Age-related osteoporosis without current pathological fracture 11/10/2017    Hypokalemia 12/26/2017     Resolved Ambulatory Problems     Diagnosis Date Noted    Closed subcapital fracture of left femur 12/26/2017    Rectal bleeding 02/24/2022     Past Medical History:   Diagnosis Date    Abnormal ECG 09/01/2022    Ankle sprain  at least 20 years    Bursitis of hip 3 years ago    Cataract 2016    Cholelithiasis 2003    Colon cancer 03/08/2006    Colon polyp 3/2/2022    Depression     Diverticulitis of colon 2012    Diverticulosis     Fracture of hip 12/26/2017    Fracture, foot 10/11/2021    GERD (gastroesophageal reflux disease) 09/01/2022    Hypertension 09/03/2022    Knee swelling 06/21/2022    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    PAST SURGICAL HISTORY  Past Surgical History:   Procedure Laterality Date    BRONCHOSCOPY N/A 11/11/2021    Procedure: BRONCHOSCOPY WITH ENDOBRONCHIAL ULTRASOUND AND NAVIGATION, BRUSHINGS, BAL, BX'S, EPI (1:10,000 IN NS);  Surgeon: Clark Richards MD;  Location: [facility] ENDOSCOPY;  Service: Pulmonary;  Laterality: N/A;  pre/post-lung nodule     CATARACT EXTRACTION Bilateral     CHOLECYSTECTOMY  2003    COLON SURGERY  2006    carcinoid tumor removed    COLONOSCOPY  04/2012    complete    COLONOSCOPY N/A 07/05/2017    Procedure: COLONOSCOPY TO CECUM;  Surgeon: Andres Lambert MD;  Location: [facility] ENDOSCOPY;  Service:     COLONOSCOPY N/A 03/02/2022    Procedure: COLONOSCOPY to cecum with 5mL saline lift injections hot snare polypectomy with clip placement x2;  Surgeon: Andres Lambert MD;  Location: [facility] ENDOSCOPY;  Service: Gastroenterology;  Laterality: N/A;  pre- hx rectal bleeding   post- diverticulosis, polyp, HEMORRHOIDS    FLEXIBLE SIGMOIDOSCOPY  07/27/2006    biopsy clear of all carcinoid    JOINT REPLACEMENT      RECTAL TUMOR BY PROCTOTOMY EXCISION      CARCINOID    REPLACEMENT TOTAL HIP SUPINE POSITION      TOTAL HIP ARTHROPLASTY Left 12/27/2017    Procedure: TOTAL HIP ARTHROPLASTY ANTERIOR WITH HANA TABLE;  Surgeon: Oral Ford MD;  Location: [facility] MAIN OR;  Service:     TOTAL THYROIDECTOMY      7/3/12  ESTEFANIA / DR DEE/ 3CM PAP MET 1/9 NODULE RIGHT LOBE / LEFT LOBE -    TRIGGER POINT INJECTION  3 years ago    Left Hip Bursa Sac         FAMILY HISTORY  Family History   Problem Relation Age of Onset    Hypertension Mother     Broken bones Mother         Little toes    Severe sprains Mother         Ankle Sprains    Hearing loss Mother     Hypertension Brother     Colon polyps Brother         No Cancer    Hearing loss Father     Malig Hyperthermia Neg Hx          SOCIAL HISTORY  Social History     Socioeconomic History    Marital status:    Tobacco Use    Smoking status: Never    Smokeless tobacco: Never   Vaping Use    Vaping status: Never Used   Substance and Sexual Activity    Alcohol use: Never    Drug use: Never    Sexual activity: Defer     Partners: Male         ALLERGIES  Ciprofloxacin, Sulfa antibiotics, Varicella virus vaccine live, and Zoster vaccine live        REVIEW OF SYSTEMS  Review of Systems   Constitutional:  Negative for chills and fever.   Respiratory:  Negative for cough and shortness of breath.    Cardiovascular:  Negative for chest pain.   Gastrointestinal:  Positive for abdominal pain. Negative for blood in stool, diarrhea, nausea and vomiting.   Genitourinary:  Negative for dysuria and flank pain.        All systems reviewed and negative except for those discussed in HPI.       PHYSICAL EXAM    I have reviewed the triage vital signs and nursing notes.    ED Triage Vitals   Temp Heart Rate Resp BP SpO2   03/03/24 1154 03/03/24 1153 03/03/24 1153 03/03/24 1216 03/03/24 1153   97.4 °F (36.3 °C) 100 20 145/87 98 %      Temp src Heart Rate Source Patient Position BP Location FiO2 (%)   03/03/24 1154 -- -- -- --   Tympanic           Physical Exam  GENERAL: alert, no acute distress  SKIN: Warm, dry  HENT: Normocephalic, atraumatic  EYES: no scleral icterus  CV: regular rhythm, regular rate  RESPIRATORY: normal effort, lungs clear  ABDOMEN: soft, left lower quadrant tenderness to  palpation  MUSCULOSKELETAL: no deformity  NEURO: alert, moves all extremities, follows commands          LAB RESULTS  Labs Reviewed   COMPREHENSIVE METABOLIC PANEL - Abnormal; Notable for the following components:       Result Value    Glucose 105 (*)     All other components within normal limits    Narrative:     GFR Normal >60  Chronic Kidney Disease <60  Kidney Failure <15    The GFR formula is only valid for adults with stable renal function between ages 18 and 70.   URINALYSIS W/ MICROSCOPIC IF INDICATED (NO CULTURE) - Abnormal; Notable for the following components:    Leuk Esterase, UA Small (1+) (*)     All other components within normal limits   CBC WITH AUTO DIFFERENTIAL - Abnormal; Notable for the following components:    WBC 11.64 (*)     Lymphocyte % 13.0 (*)     Neutrophils, Absolute 8.78 (*)     Monocytes, Absolute 1.20 (*)     All other components within normal limits   URINALYSIS, MICROSCOPIC ONLY - Abnormal; Notable for the following components:    WBC, UA 6-10 (*)     All other components within normal limits   LIPASE - Normal   CBC AND DIFFERENTIAL    Narrative:     The following orders were created for panel order CBC & Differential.  Procedure                               Abnormality         Status                     ---------                               -----------         ------                     CBC Auto Differential[[id number]]        Abnormal            Final result                 Please view results for these tests on the individual orders.       Ordered the above labs and independently reviewed and interpreted the results.        RADIOLOGY  CT Abdomen Pelvis With Contrast   Final Result   FINDINGS:  1. There is acute uncomplicated mid sigmoid diverticulitis. There is a  trace amount of free fluid adjacently, but there is no fluid collection.  No bowel ileus or obstruction. There is extensive sigmoid  diverticulosis. Appendix appears within normal limits.     2. Urinary bladder is  significantly distended. Please correlate  clinically for urinary retention. There is significant pelvic floor  relaxation and there may be some vaginal prolapse, but there is no  evidence for a cystocele. Uterus and adnexa appear unremarkable.     3. Kidneys, spleen, pancreas, and left adrenal appear unremarkable.  There is a stable 1.4 cm right adrenal adenoma. The liver appears  unremarkable and the degree of biliary prominence is unchanged since a  previous chest CT 11/09/2021, and is likely cholecystectomy related.     4. There has been increase in size of a 7 mm left lower lobe pulmonary  nodule previously measuring 5 mm on 11/09/2021. Follow-up and referral  to the [facility] is recommended.       I ordered the above noted radiological studies. Independently reviewed and interpreted by me.  See dictation for official radiology interpretation.      PROCEDURES    Procedures      MEDICATIONS GIVEN IN ER    Medications   iopamidol (ISOVUE-300) 61 % injection 85 mL (85 mL Intravenous Given 3/3/24 1316)         PROGRESS, DATA ANALYSIS, CONSULTS, AND MEDICAL DECISION MAKING    All labs have been independently reviewed and interpreted by me.  All radiology studies have been independently reviewed and interpreted by me and discussed with radiologist dictating the report.   EKG's independently reviewed and interpreted by me.  Discussion below represents my analysis of pertinent findings related to patient's condition, differential diagnosis, treatment plan and final disposition.    Differential diagnosis: Colitis, diverticulitis, constipation, ureterolithiasis, cystitis, pyelonephritis    ED Course as of 03/06/24 1549   Sun Mar 03, 2024   1253 WBC(!): 11.64 [DC]   1253 Hemoglobin: 13.5 [DC]   1325 CT Abdomen Pelvis With Contrast  My independent interpretation of the imaging study is distended urinary bladder [TR]      ED Course User Index  [DC] Citlalli Ann PA  [TR] John Lynch MD              AS OF 15:49 EST VITALS:    BP - 151/73  HR - 73  TEMP - 97.4 °F (36.3 °C) (Tympanic)  O2 SATS - 100%        DIAGNOSIS  Final diagnoses:   Sigmoid diverticulitis   Urinary retention         DISPOSITION  ED Disposition       ED Disposition   Discharge    Condition   Stable    Comment   --                  Note Disclaimer: At [facility], we believe that sharing information builds trust and better relationships. You are receiving this note because you recently visited [facility]. It is possible you will see health information before a provider has talked with you about it. This kind of information can be easy to misunderstand. To help you fully understand what it means for your health, we urge you to discuss this note with your provider.         Citlalli Ann PA  03/06/24 3847

## 2024-04-12 ENCOUNTER — APPOINTMENT (OUTPATIENT)
Dept: WOMENS IMAGING | Facility: HOSPITAL | Age: 79
End: 2024-04-12
Payer: MEDICARE

## 2024-04-12 PROCEDURE — 77063 BREAST TOMOSYNTHESIS BI: CPT | Performed by: RADIOLOGY

## 2024-04-12 PROCEDURE — 77067 SCR MAMMO BI INCL CAD: CPT | Performed by: RADIOLOGY

## 2024-04-15 ENCOUNTER — OFFICE VISIT (OUTPATIENT)
Dept: FAMILY MEDICINE CLINIC | Facility: CLINIC | Age: 79
End: 2024-04-15
Payer: MEDICARE

## 2024-04-15 VITALS
DIASTOLIC BLOOD PRESSURE: 70 MMHG | HEART RATE: 82 BPM | BODY MASS INDEX: 25.16 KG/M2 | RESPIRATION RATE: 18 BRPM | OXYGEN SATURATION: 98 % | HEIGHT: 65 IN | SYSTOLIC BLOOD PRESSURE: 120 MMHG | WEIGHT: 151 LBS

## 2024-04-15 DIAGNOSIS — C73 PAPILLARY CARCINOMA OF THYROID: ICD-10-CM

## 2024-04-15 DIAGNOSIS — E78.2 MIXED HYPERLIPIDEMIA: Primary | ICD-10-CM

## 2024-04-15 DIAGNOSIS — E89.0 POSTOPERATIVE HYPOTHYROIDISM: ICD-10-CM

## 2024-04-15 DIAGNOSIS — I10 ESSENTIAL HYPERTENSION: ICD-10-CM

## 2024-04-15 PROCEDURE — G0439 PPPS, SUBSEQ VISIT: HCPCS | Performed by: FAMILY MEDICINE

## 2024-04-15 PROCEDURE — 3074F SYST BP LT 130 MM HG: CPT | Performed by: FAMILY MEDICINE

## 2024-04-15 PROCEDURE — 3078F DIAST BP <80 MM HG: CPT | Performed by: FAMILY MEDICINE

## 2024-04-15 PROCEDURE — 96160 PT-FOCUSED HLTH RISK ASSMT: CPT | Performed by: FAMILY MEDICINE

## 2024-04-15 RX ORDER — LISINOPRIL 10 MG/1
10 TABLET ORAL DAILY
Qty: 90 TABLET | Refills: 1 | Status: SHIPPED | OUTPATIENT
Start: 2024-04-15 | End: 2024-04-15

## 2024-04-15 RX ORDER — LISINOPRIL 10 MG/1
10 TABLET ORAL DAILY
Qty: 90 TABLET | Refills: 1 | Status: SHIPPED | OUTPATIENT
Start: 2024-04-15

## 2024-04-15 NOTE — PROGRESS NOTES
"Medicare Wellness Visit     Gloria Álvarez is a 78 y.o. female who presents for a Medicare Wellness Visit.    Compared to one year ago, the patient feels her physical health is the same and her mental health is the same.  Recent Hospitalizations:  She was not admitted to the hospital during the last year.   Current Medical Providers:  Patient Care Team:  Jess Holden MD as PCP - General (Family Medicine)  No opioid medication identified on active medication list. I have reviewed chart for other potential  high risk medication/s and harmful drug interactions in the elderly.  Aspirin is not on active medication list.  Aspirin use is not indicated based on review of current medical condition/s. Risk of harm outweighs potential benefits.  .  Advance Care Planning   Advance Directive is on file.  ACP discussion was held with the patient during this visit. Patient has an advance directive in EMR which is still valid.      Estimated body mass index is 25.13 kg/m² as calculated from the following:    Height as of this encounter: 165.1 cm (65\").    Weight as of this encounter: 68.5 kg (151 lb).  BMI is >= 25 and <30. (Overweight) The following options were offered after discussion;: Her weight is just fine!   Does the patient have evidence of cognitive impairment? No  HEALTH RISK ASSESSMENT  Smoking Status:  Social History     Tobacco Use   Smoking Status Never   Smokeless Tobacco Never     Alcohol Consumption:  Social History     Substance and Sexual Activity   Alcohol Use Never     Fall Risk Screen:  STEADI Fall Risk Assessment was completed, and patient is at LOW risk for falls.Assessment completed on:4/15/2024  Depression Screenin/15/2024     1:35 PM   PHQ-2/PHQ-9 Depression Screening   Little Interest or Pleasure in Doing Things 0-->not at all   Feeling Down, Depressed or Hopeless 0-->not at all   PHQ-9: Brief Depression Severity Measure Score 0     Health Habits and Functional and Cognitive " Screenin/8/2024     7:39 PM   Functional & Cognitive Status   Do you have difficulty preparing food and eating? No   Do you have difficulty bathing yourself, getting dressed or grooming yourself? No   Do you have difficulty using the toilet? No   Do you have difficulty moving around from place to place? No   Do you have trouble with steps or getting out of a bed or a chair? No   Current Diet Well Balanced Diet   Dental Exam Up to date   Eye Exam Up to date   Exercise (times per week) 3 times per week   Current Exercises Include Home Exercise Program (TV, Computer, Etc.)   Do you need help using the phone?  No   Are you deaf or do you have serious difficulty hearing?  No   Do you need help to go to places out of walking distance? No   Do you need help shopping? No   Do you need help preparing meals?  No   Do you need help with housework?  No   Do you need help with laundry? No   Do you need help taking your medications? No   Do you need help managing money? No   Do you ever drive or ride in a car without wearing a seat belt? No   Have you felt unusual stress, anger or loneliness in the last month? No   Who do you live with? Spouse   If you need help, do you have trouble finding someone available to you? No   Have you been bothered in the last four weeks by sexual problems? No   Do you have difficulty concentrating, remembering or making decisions? No       Health Habits  Current Diet: Well Balanced Diet  Dental Exam: Up to date  Eye Exam: Up to date  Exercise (times per week): 3 times per week  Current Exercises Include: Home Exercise Program (TV, Computer, Etc.)  Age-appropriate Screening Schedule:  Refer to the list below for future screening recommendations based on patient's age, sex and/or medical conditions. Orders for these recommended tests are listed in the plan section. The patient has been provided with a written plan.  Health Maintenance   Topic Date Due    BMI FOLLOWUP  06/10/2020    DXA SCAN   12/05/2021    INFLUENZA VACCINE  08/01/2024    LIPID PANEL  04/08/2025    ANNUAL WELLNESS VISIT  04/15/2025    COLORECTAL CANCER SCREENING  03/02/2027    TDAP/TD VACCINES (2 - Td or Tdap) 09/07/2028    COVID-19 Vaccine  Completed    RSV Vaccine - Adults  Completed    Pneumococcal Vaccine 65+  Completed      Patient's (Body mass index is 25.13 kg/m².) indicates that they are overweight (BMI 25-29.9) with health related conditions that include none . Weight is unchanged. BMI is  barely above average and needs no treatment .     CMS Preventative Services Quick Reference  Risk Factors Identified During Encounter  None Identified  The above risks/problems have been discussed with the patient.  Follow up actions/plans if indicated are seen below in the Assessment/Plan Section.  Pertinent information has been shared with the patient in the After Visit Summary.  Patient Instructions        Follow Up: Medicare visit in one year  An After Visit Summary and PPPS were given/sent to the patient.    Patient has multiple medical problems which are significant and separately identifiable that require additional work above and beyond the Medicare Wellness Visit. These are not trivial or insignificant and are billed with a 25-modifier  Problem Visit:  Gloria is a 78 y.o. also being seen today for hyperlipidemia, iatrogenic hyperthyroidism   Subjective   History of the Present Illness   No complaints.  Taking meds as ordered.  No headache, chest pain or shortness of breath.   Social History  She  reports that she has never smoked. She has never used smokeless tobacco. She reports that she does not drink alcohol and does not use drugs.  Objective   Vital Signs        BP Readings from Last 1 Encounters:   04/15/24 120/70     Wt Readings from Last 3 Encounters:   04/15/24 68.5 kg (151 lb)   03/03/24 69.4 kg (153 lb)   03/03/24 69.4 kg (153 lb)   Body mass index is 25.13 kg/m².     Physical Exam  Vitals reviewed.   Constitutional:        Appearance: Normal appearance. She is well-developed.   Cardiovascular:      Rate and Rhythm: Normal rate and regular rhythm.      Heart sounds: Normal heart sounds.   Pulmonary:      Effort: Pulmonary effort is normal.      Breath sounds: Normal breath sounds.   Neurological:      Mental Status: She is alert.   Psychiatric:         Behavior: Behavior normal.         Thought Content: Thought content normal.         Judgment: Judgment normal.           Assessment and Plan       Assessment & Plan  Mixed hyperlipidemia   Labs are stable.   Papillary carcinoma of thyroid    Postoperative hypothyroidism  Her TSH is overtreated as a consequence of her cancer.   Essential hypertension  Chris 4/15/2024  BP is controlled well. She will recheck in six months. She will continue present meds. Prescription is sent today. .            Patient was given instructions and counseling regarding her condition or for health maintenance advice. Please see specific information pulled into the AVS if appropriate.

## 2024-04-15 NOTE — ASSESSMENT & PLAN NOTE
Chris 4/15/2024  BP is controlled well. She will recheck in six months. She will continue present meds. Prescription is sent today. .

## 2024-07-08 ENCOUNTER — READMISSION MANAGEMENT (OUTPATIENT)
Dept: CALL CENTER | Facility: HOSPITAL | Age: 79
End: 2024-07-08
Payer: MEDICARE

## 2024-07-09 ENCOUNTER — TRANSITIONAL CARE MANAGEMENT TELEPHONE ENCOUNTER (OUTPATIENT)
Dept: CALL CENTER | Facility: HOSPITAL | Age: 79
End: 2024-07-09
Payer: MEDICARE

## 2024-07-09 DIAGNOSIS — B34.8 PARAINFLUENZA: Primary | ICD-10-CM

## 2024-07-09 RX ORDER — HYDROCODONE POLISTIREX AND CHLORPHENIRAMINE POLISTIREX 10; 8 MG/5ML; MG/5ML
5 SUSPENSION, EXTENDED RELEASE ORAL EVERY 12 HOURS PRN
Qty: 40 ML | Refills: 0 | Status: SHIPPED | OUTPATIENT
Start: 2024-07-09 | End: 2024-07-09 | Stop reason: SDUPTHER

## 2024-07-09 RX ORDER — HYDROCODONE POLISTIREX AND CHLORPHENIRAMINE POLISTIREX 10; 8 MG/5ML; MG/5ML
5 SUSPENSION, EXTENDED RELEASE ORAL EVERY 12 HOURS PRN
Qty: 40 ML | Refills: 0 | Status: SHIPPED | OUTPATIENT
Start: 2024-07-09 | End: 2024-07-14

## 2024-07-09 NOTE — OUTREACH NOTE
Call Center TCM Note      Flowsheet Row Responses   [facility] patient discharged from? Non-   Does the patient have one of the following disease processes/diagnoses(primary or secondary)? Other   TCM attempt successful? Yes   Call start time 0929   Call end time 0931   Discharge diagnosis Parainfluenza virus infection   Meds reviewed with patient/caregiver? Yes   Is the patient having any side effects they believe may be caused by any medication additions or changes? No   Does the patient have all medications ordered at discharge? Yes   Is the patient taking all medications as directed (includes completed medication regime)? Yes   Does the patient have an appointment with their PCP within 7-14 days of discharge? No   Nursing Interventions Patient declined scheduling/rescheduling appointment at this time   Has home health visited the patient within 72 hours of discharge? N/A   Psychosocial issues? No   Did the patient receive a copy of their discharge instructions? Yes   Nursing interventions Reviewed instructions with patient   What is the patient's perception of their health status since discharge? Same   TCM call completed? Yes   Call end time 0931            Noa Hart RN    7/9/2024, 09:31 EDT         Adult

## 2024-07-09 NOTE — OUTREACH NOTE
Prep Survey      Flowsheet Row Responses   [facility] facility patient discharged from? Non-[facility]   Is LACE score < 7 ? Non-BH Discharge   Eligibility [facility]   Date of Admission 07/06/24   Date of Discharge 07/08/24   Discharge Disposition Home or Self Care   Discharge diagnosis Parainfluenza virus infection   Does the patient have one of the following disease processes/diagnoses(primary or secondary)? Other   Does the patient have Home health ordered? No   Prep survey completed? Yes            Lindsay DAILY - Registered Nurse

## 2024-08-29 ENCOUNTER — OFFICE VISIT (OUTPATIENT)
Dept: ORTHOPEDIC SURGERY | Facility: CLINIC | Age: 79
End: 2024-08-29
Payer: MEDICARE

## 2024-08-29 VITALS — WEIGHT: 158 LBS | HEIGHT: 65 IN | TEMPERATURE: 97.5 F | BODY MASS INDEX: 26.33 KG/M2

## 2024-08-29 DIAGNOSIS — M17.11 PRIMARY OSTEOARTHRITIS OF RIGHT KNEE: ICD-10-CM

## 2024-08-29 DIAGNOSIS — M25.561 RIGHT KNEE PAIN, UNSPECIFIED CHRONICITY: Primary | ICD-10-CM

## 2024-08-29 RX ORDER — LIDOCAINE HYDROCHLORIDE 10 MG/ML
5 INJECTION, SOLUTION EPIDURAL; INFILTRATION; INTRACAUDAL; PERINEURAL
Status: COMPLETED | OUTPATIENT
Start: 2024-08-29 | End: 2024-08-29

## 2024-08-29 RX ORDER — METHYLPREDNISOLONE ACETATE 80 MG/ML
80 INJECTION, SUSPENSION INTRA-ARTICULAR; INTRALESIONAL; INTRAMUSCULAR; SOFT TISSUE
Status: COMPLETED | OUTPATIENT
Start: 2024-08-29 | End: 2024-08-29

## 2024-08-29 RX ADMIN — LIDOCAINE HYDROCHLORIDE 5 ML: 10 INJECTION, SOLUTION EPIDURAL; INFILTRATION; INTRACAUDAL; PERINEURAL at 14:18

## 2024-08-29 RX ADMIN — METHYLPREDNISOLONE ACETATE 80 MG: 80 INJECTION, SUSPENSION INTRA-ARTICULAR; INTRALESIONAL; INTRAMUSCULAR; SOFT TISSUE at 14:18

## 2024-08-29 NOTE — PROGRESS NOTES
Patient: Gloria Álvarez  YOB: 1945 78 y.o. female  Medical Record Number: [id number]    Chief Complaints:   Chief Complaint   Patient presents with   • Right Knee - Follow-up, Pain       History of Present Illness:Gloria Álvarez is a 78 y.o. female who presents for follow-up of right knee pain that is chronic in nature.  Patient has known right knee osteoarthritis that we have been conservatively treating.  Patient has not been seen in approximately 2 years.  States that conservative measures at that time were working.  Reports as of recently her symptoms have progressively worsened.  Patient reports anterior knee pain both to the medial and lateral portions of the knee.  Denies any recent fall injury or trauma.  Patient describes her pain as a constant ache is worse with certain positions or movements.  Denies any mechanical symptoms of catching or locking.  Denies any instability or buckling issues.  Patient is ambulating full weightbearing and walks unassisted in clinic today.  She denies any sign or symptoms of infection, is without any other significant complaints today.    Allergies:   Allergies   Allergen Reactions   • Ciprofloxacin Itching     JOINTS ACHY     • Sulfa Antibiotics Itching   • Varicella Virus Vaccine Live Other (See Comments)     FLU LIKE SYMPTOMS, RED AT SITE   • Zoster Vaccine Live Rash and Other (See Comments)     FLU LIKE SYMPTOMS, RED AT SITE         Medications:   Current Outpatient Medications   Medication Sig Dispense Refill   • Calcium Carbonate-Vit D-Min (CALTRATE 600+D PLUS MINERALS) 600-800 MG-UNIT chewable tablet Chew 1 tablet Daily.     • Cholecalciferol (VITAMIN D3) 1000 units capsule Take  by mouth.     • fluticasone (FLONASE) 50 MCG/ACT nasal spray USE 2 SPRAYS NASALLY EVERY DAY AS DIRECTED BY PROVIDER 48 g 2   • levothyroxine (SYNTHROID, LEVOTHROID) 137 MCG tablet Take 1 tablet by mouth Daily. Dr. Horton is controlling     • lisinopril (PRINIVIL,ZESTRIL)  "10 MG tablet Take 1 tablet by mouth Daily. 90 tablet 1   • omeprazole (priLOSEC) 20 MG capsule Take 1 capsule by mouth Daily. 90 capsule 3   • triamcinolone (KENALOG) 0.1 % cream Apply 1 application  topically to the appropriate area as directed 2 (Two) Times a Day. 45 g 0   • vitamin E 400 UNIT capsule Take 1 capsule by mouth Daily.     • albuterol sulfate  (90 Base) MCG/ACT inhaler Inhale 2 puffs Every 4 (Four) Hours As Needed for Wheezing or Shortness of Air. 8 g 0   • azithromycin (Zithromax Z-Luis A) 250 MG tablet Take 2 tablets by mouth on day 1, then 1 tablet daily on days 2-5 6 tablet 0     No current facility-administered medications for this visit.         The following portions of the patient's history were reviewed and updated as appropriate: allergies, current medications, past family history, past medical history, past social history, past surgical history and problem list.    Review of Systems:   Pertinent positives/negative listed in HPI above    Physical Exam:   Vitals:    08/29/24 1356   Temp: 97.5 °F (36.4 °C)   TempSrc: Temporal   Weight: 71.7 kg (158 lb)   Height: 163.8 cm (64.5\")   PainSc:   7   PainLoc: Knee       General: A and O x 3, ASA, NAD      Knee Exam List: Knee:  right    ALIGNMENT: Slightly varus  ,   Patella  tracks  midline    GAIT:    Antalgic    SKIN:    No abnormality    RANGE OF MOTION:   0  -  122   DEG    STRENGTH:   4  / 5    LIGAMENTS:    No varus / valgus instability.   Negative  Lachman.    MENISCUS:     Negative   Mary       DISTAL PULSES:    Paplable    DISTAL SENSATION :   Intact    LYMPHATICS:     No   lymphadenopathy    OTHER:          - Positive   effusion      - Crepitance with ROM     Radiology:  Xrays 3views right knee (ap,lateral, sunrise) were ordered and reviewed for evaluation of knee pain demonstrating joint space narrowing to the medial patellofemoral compartments. Today's xrays were compared to previous xrays and demonstrate no significant " change.    Assessment/Plan: Primary osteoarthritis right knee    Treatment option as well as imaging results were discussed in detail with the patient.  I would still like to proceed forward with conservative measures at this time.  I reeducated the patient on the RICE method to help with inflammation and swelling.  Patient has not responded well to previous cortisone injections in which we will give her another 1 today.  She can follow back up with us on an as-needed basis.    Large Joint Arthrocentesis: R knee  Date/Time: 8/29/2024 2:18 PM  Consent given by: patient  Site marked: site marked  Timeout: Immediately prior to procedure a time out was called to verify the correct patient, procedure, equipment, support staff and site/side marked as required   Supporting Documentation  Indications: pain and joint swelling   Procedure Details  Location: knee - R knee  Preparation: Patient was prepped and draped in the usual sterile fashion  Needle size: 22 G  Approach: anterolateral  Medications administered: 80 mg methylPREDNISolone acetate 80 MG/ML; 5 mL lidocaine PF 1% 1 %  Patient tolerance: patient tolerated the procedure well with no immediate complications        AMADEO Jacques  8/29/2024

## 2024-09-16 DIAGNOSIS — I10 ESSENTIAL HYPERTENSION: ICD-10-CM

## 2024-09-16 RX ORDER — LISINOPRIL 10 MG/1
10 TABLET ORAL DAILY
Qty: 90 TABLET | Refills: 3 | Status: SHIPPED | OUTPATIENT
Start: 2024-09-16

## 2025-03-12 ENCOUNTER — TELEPHONE (OUTPATIENT)
Dept: FAMILY MEDICINE CLINIC | Facility: CLINIC | Age: 80
End: 2025-03-12

## 2025-03-12 DIAGNOSIS — E78.2 MIXED HYPERLIPIDEMIA: Primary | ICD-10-CM

## 2025-03-12 DIAGNOSIS — E89.0 POSTOPERATIVE HYPOTHYROIDISM: ICD-10-CM

## 2025-03-12 DIAGNOSIS — I10 ESSENTIAL HYPERTENSION: ICD-10-CM

## 2025-03-12 DIAGNOSIS — Z13.1 DIABETES MELLITUS SCREENING: ICD-10-CM

## 2025-03-12 NOTE — TELEPHONE ENCOUNTER
Caller: Gloria Álvarez    Relationship: Self    Best call back number: [phone]    What orders are you requesting (i.e. lab or imaging): LABS     In what timeframe would the patient need to come in: ONE WEEK BEFORE 05/12    Where will you receive your lab/imaging services: OFFICE    Additional notes: PLEASE ADVISE PATIENT ASAP TO GET THIS SET UP.

## 2025-03-21 ENCOUNTER — HOSPITAL ENCOUNTER (EMERGENCY)
Facility: HOSPITAL | Age: 80
Discharge: HOME | End: 2025-03-22
Attending: EMERGENCY MEDICINE
Payer: MEDICARE

## 2025-03-21 ENCOUNTER — APPOINTMENT (OUTPATIENT)
Dept: CT IMAGING | Facility: HOSPITAL | Age: 80
End: 2025-03-21
Payer: MEDICARE

## 2025-03-21 DIAGNOSIS — R10.13 EPIGASTRIC PAIN: Primary | ICD-10-CM

## 2025-03-21 LAB
ALBUMIN SERPL-MCNC: 4.2 G/DL (ref 3.5–5.2)
ALBUMIN/GLOB SERPL: 1.3 G/DL
ALP SERPL-CCNC: 127 U/L (ref 39–117)
ALT SERPL W P-5'-P-CCNC: 12 U/L (ref 1–33)
ANION GAP SERPL CALCULATED.3IONS-SCNC: 14.5 MMOL/L (ref 5–15)
AST SERPL-CCNC: 17 U/L (ref 1–32)
BASOPHILS # BLD AUTO: 0.05 10*3/MM3 (ref 0–0.2)
BASOPHILS NFR BLD AUTO: 0.7 % (ref 0–1.5)
BILIRUB SERPL-MCNC: 0.3 MG/DL (ref 0–1.2)
BILIRUB UR QL STRIP: NEGATIVE
BUN SERPL-MCNC: 10 MG/DL (ref 8–23)
BUN/CREAT SERPL: 10.1 (ref 7–25)
CALCIUM SPEC-SCNC: 9.2 MG/DL (ref 8.6–10.5)
CHLORIDE SERPL-SCNC: 102 MMOL/L (ref 98–107)
CLARITY UR: CLEAR
CO2 SERPL-SCNC: 20.5 MMOL/L (ref 22–29)
COLOR UR: YELLOW
CREAT SERPL-MCNC: 0.99 MG/DL (ref 0.57–1)
DEPRECATED RDW RBC AUTO: 37.5 FL (ref 37–54)
EGFRCR SERPLBLD CKD-EPI 2021: 57.4 ML/MIN/1.73
EOSINOPHIL # BLD AUTO: 0.13 10*3/MM3 (ref 0–0.4)
EOSINOPHIL NFR BLD AUTO: 1.7 % (ref 0.3–6.2)
ERYTHROCYTE [DISTWIDTH] IN BLOOD BY AUTOMATED COUNT: 11.7 % (ref 12.3–15.4)
GEN 5 1HR TROPONIN T REFLEX: 7 NG/L
GLOBULIN UR ELPH-MCNC: 3.2 GM/DL
GLUCOSE SERPL-MCNC: 122 MG/DL (ref 65–99)
GLUCOSE UR STRIP-MCNC: NEGATIVE MG/DL
HCT VFR BLD AUTO: 39.9 % (ref 34–46.6)
HGB BLD-MCNC: 13.5 G/DL (ref 12–15.9)
HGB UR QL STRIP.AUTO: NEGATIVE
HOLD SPECIMEN: NORMAL
HOLD SPECIMEN: NORMAL
IMM GRANULOCYTES # BLD AUTO: 0.02 10*3/MM3 (ref 0–0.05)
IMM GRANULOCYTES NFR BLD AUTO: 0.3 % (ref 0–0.5)
KETONES UR QL STRIP: NEGATIVE
LEUKOCYTE ESTERASE UR QL STRIP.AUTO: ABNORMAL
LIPASE SERPL-CCNC: 35 U/L (ref 13–60)
LYMPHOCYTES # BLD AUTO: 2 10*3/MM3 (ref 0.7–3.1)
LYMPHOCYTES NFR BLD AUTO: 26.5 % (ref 19.6–45.3)
MCH RBC QN AUTO: 30.2 PG (ref 26.6–33)
MCHC RBC AUTO-ENTMCNC: 33.8 G/DL (ref 31.5–35.7)
MCV RBC AUTO: 89.3 FL (ref 79–97)
MONOCYTES # BLD AUTO: 0.63 10*3/MM3 (ref 0.1–0.9)
MONOCYTES NFR BLD AUTO: 8.4 % (ref 5–12)
NEUTROPHILS NFR BLD AUTO: 4.71 10*3/MM3 (ref 1.7–7)
NEUTROPHILS NFR BLD AUTO: 62.4 % (ref 42.7–76)
NITRITE UR QL STRIP: NEGATIVE
NRBC BLD AUTO-RTO: 0 /100 WBC (ref 0–0.2)
PH UR STRIP.AUTO: 6.5 [PH] (ref 5–8)
PLATELET # BLD AUTO: 254 10*3/MM3 (ref 140–450)
PMV BLD AUTO: 8.9 FL (ref 6–12)
POTASSIUM SERPL-SCNC: 3.7 MMOL/L (ref 3.5–5.2)
PROT SERPL-MCNC: 7.4 G/DL (ref 6–8.5)
PROT UR QL STRIP: NEGATIVE
RBC # BLD AUTO: 4.47 10*6/MM3 (ref 3.77–5.28)
SODIUM SERPL-SCNC: 137 MMOL/L (ref 136–145)
SP GR UR STRIP: 1.02 (ref 1–1.03)
TROPONIN T NUMERIC DELTA: NORMAL
TROPONIN T SERPL HS-MCNC: <6 NG/L
UROBILINOGEN UR QL STRIP: ABNORMAL
WBC NRBC COR # BLD AUTO: 7.54 10*3/MM3 (ref 3.4–10.8)
WHOLE BLOOD HOLD COAG: NORMAL
WHOLE BLOOD HOLD SPECIMEN: NORMAL

## 2025-03-21 PROCEDURE — 25510000001 IOPAMIDOL PER 1 ML: Performed by: EMERGENCY MEDICINE

## 2025-03-21 PROCEDURE — 71275 CT ANGIOGRAPHY CHEST: CPT

## 2025-03-21 PROCEDURE — 99285 EMERGENCY DEPT VISIT HI MDM: CPT

## 2025-03-21 PROCEDURE — 36415 COLL VENOUS BLD VENIPUNCTURE: CPT

## 2025-03-21 PROCEDURE — 84484 ASSAY OF TROPONIN QUANT: CPT | Performed by: EMERGENCY MEDICINE

## 2025-03-21 PROCEDURE — 83690 ASSAY OF LIPASE: CPT | Performed by: EMERGENCY MEDICINE

## 2025-03-21 PROCEDURE — 74177 CT ABD & PELVIS W/CONTRAST: CPT

## 2025-03-21 PROCEDURE — 81001 URINALYSIS AUTO W/SCOPE: CPT | Performed by: EMERGENCY MEDICINE

## 2025-03-21 PROCEDURE — 80053 COMPREHEN METABOLIC PANEL: CPT | Performed by: EMERGENCY MEDICINE

## 2025-03-21 PROCEDURE — 93005 ELECTROCARDIOGRAM TRACING: CPT

## 2025-03-21 PROCEDURE — 93005 ELECTROCARDIOGRAM TRACING: CPT | Performed by: EMERGENCY MEDICINE

## 2025-03-21 PROCEDURE — 93010 ELECTROCARDIOGRAM REPORT: CPT | Performed by: INTERNAL MEDICINE

## 2025-03-21 PROCEDURE — 85025 COMPLETE CBC W/AUTO DIFF WBC: CPT | Performed by: EMERGENCY MEDICINE

## 2025-03-21 RX ORDER — SODIUM CHLORIDE 0.9 % (FLUSH) 0.9 %
10 SYRINGE (ML) INJECTION AS NEEDED
Status: DISCONTINUED | OUTPATIENT
Start: 2025-03-21 | End: 2025-03-22 | Stop reason: HOSPADM

## 2025-03-21 RX ORDER — IOPAMIDOL 755 MG/ML
100 INJECTION, SOLUTION INTRAVASCULAR
Status: COMPLETED | OUTPATIENT
Start: 2025-03-21 | End: 2025-03-21

## 2025-03-21 RX ADMIN — IOPAMIDOL 85 ML: 755 INJECTION, SOLUTION INTRAVENOUS at 22:10

## 2025-03-22 VITALS
WEIGHT: 158.29 LBS | HEIGHT: 65 IN | BODY MASS INDEX: 26.37 KG/M2 | TEMPERATURE: 97 F | DIASTOLIC BLOOD PRESSURE: 74 MMHG | HEART RATE: 78 BPM | RESPIRATION RATE: 18 BRPM | SYSTOLIC BLOOD PRESSURE: 142 MMHG | OXYGEN SATURATION: 97 %

## 2025-03-22 LAB
BACTERIA UR QL AUTO: ABNORMAL /HPF
HYALINE CASTS UR QL AUTO: ABNORMAL /LPF
QT INTERVAL: 363 MS
QTC INTERVAL: 475 MS
RBC # UR STRIP: ABNORMAL /HPF
REF LAB TEST METHOD: ABNORMAL
SQUAMOUS #/AREA URNS HPF: ABNORMAL /HPF
WBC # UR STRIP: ABNORMAL /HPF

## 2025-03-22 NOTE — ED PROVIDER NOTES
EMERGENCY DEPARTMENT ENCOUNTER  Room Number:  20/20  PCP: Jess Holden MD  Independent Historians: Patient      HPI:  Chief Complaint: had concerns including Abdominal Pain, Back Pain, and Shoulder Pain.     A complete HPI/ROS/PMH/PSH/SH/FH are unobtainable due to: None    Chronic or social conditions impacting patient care (Social Determinants of Health): None      Context: Gloria Álvarez is a 79 y.o. female with a medical history of papillary thyroid cancer on immunotherapy and status post radiation, asthma, hypertension who presents to the ED c/o acute epigastric abdominal pain.  Symptoms really seem to start an hour prior to arrival in the emergency department.  She notes severe epigastric pain that radiated under her breast bilaterally and somewhat to her shoulders.  She also notes that she has had some upper back tightness.  This upper back tightness is actually been intermittent over the past several days.  She denies any fever, cough or congestion.  No nausea or vomiting.  Not have any constipation, diarrhea, hematuria or dysuria.  Symptoms have overall improved since onset.      Review of prior external notes (non-ED) -and- Review of prior external test results outside of this encounter:  I reviewed the oncology office visit note from 1/17/2025 where patient is being treated for papillary thyroid carcinoma    Prescription drug monitoring program review:     N/A    PAST MEDICAL HISTORY  Active Ambulatory Problems     Diagnosis Date Noted    Papillary carcinoma of thyroid 05/24/2016    Atrophic vaginitis 05/24/2016    Chronic interstitial cystitis 05/24/2016    Hyperlipidemia 03/18/2016    Insomnia 10/02/2014    Postoperative hypothyroidism 10/01/2015    Hx of benign carcinoid tumor 07/05/2017    Hx of diverticulitis of colon 07/05/2017    Age-related osteoporosis without current pathological fracture 11/10/2017    Hypokalemia 12/26/2017    Essential hypertension 04/15/2024     Resolved Ambulatory  Problems     Diagnosis Date Noted    Closed subcapital fracture of left femur 12/26/2017    Rectal bleeding 02/24/2022     Past Medical History:   Diagnosis Date    Abnormal ECG 09/01/2022    Ankle sprain at least 20 years    Bursitis of hip 3 years ago    Cataract 2016    Cholelithiasis 2003    Colon cancer 03/08/2006    Colon polyp 3/2/2022    Depression     Diverticulitis of colon 2012    Diverticulosis     Fracture of hip 12/26/2017    Fracture, foot 10/11/2021    GERD (gastroesophageal reflux disease) 09/01/2022    Hypertension 09/03/2022    Knee swelling 06/21/2022    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    Osteopenia 2019    Osteoporosis     Personal history of asthma     Stress fracture 01/2018         PAST SURGICAL HISTORY  Past Surgical History:   Procedure Laterality Date    BRONCHOSCOPY N/A 11/11/2021    Procedure: BRONCHOSCOPY WITH ENDOBRONCHIAL ULTRASOUND AND NAVIGATION, BRUSHINGS, BAL, BX'S, EPI (1:10,000 IN NS);  Surgeon: Clark Richards MD;  Location: [facility] ENDOSCOPY;  Service: Pulmonary;  Laterality: N/A;  pre/post-lung nodule     CATARACT EXTRACTION Bilateral     CHOLECYSTECTOMY  2003    COLON SURGERY  2006    carcinoid tumor removed    COLONOSCOPY  04/2012    complete    COLONOSCOPY N/A 07/05/2017    Procedure: COLONOSCOPY TO CECUM;  Surgeon: Andres Lambert MD;  Location: [facility] ENDOSCOPY;  Service:     COLONOSCOPY N/A 03/02/2022    Procedure: COLONOSCOPY to cecum with 5mL saline lift injections hot snare polypectomy with clip placement x2;  Surgeon: Andres Lambert MD;  Location: [facility] ENDOSCOPY;  Service: Gastroenterology;  Laterality: N/A;  pre- hx rectal bleeding   post- diverticulosis, polyp, HEMORRHOIDS    FLEXIBLE SIGMOIDOSCOPY  07/27/2006    biopsy clear of all carcinoid    JOINT REPLACEMENT      RECTAL TUMOR BY PROCTOTOMY EXCISION      CARCINOID    REPLACEMENT TOTAL HIP SUPINE POSITION      TOTAL HIP ARTHROPLASTY Left 12/27/2017    Procedure: TOTAL HIP ARTHROPLASTY  ANTERIOR WITH HANA TABLE;  Surgeon: Oral Ford MD;  Location: [facility] OR;  Service:     TOTAL THYROIDECTOMY      7/3/12 ESTEFANIA / DR DEE/ 3CM PAP MET 1/9 NODULE RIGHT LOBE / LEFT LOBE -    TRIGGER POINT INJECTION  3 years ago    Left Hip Bursa Sac    TUBAL ABDOMINAL LIGATION  1970         FAMILY HISTORY  Family History   Problem Relation Age of Onset    Hypertension Mother     Broken bones Mother         Little toes    Severe sprains Mother         Ankle Sprains    Hearing loss Mother     Hypertension Brother     Colon polyps Brother         No Cancer    Hearing loss Father     Malig Hyperthermia Neg Hx          SOCIAL HISTORY  Social History     Socioeconomic History    Marital status:    Tobacco Use    Smoking status: Never    Smokeless tobacco: Never   Vaping Use    Vaping status: Never Used   Substance and Sexual Activity    Alcohol use: Never    Drug use: Never    Sexual activity: Defer     Partners: Male         ALLERGIES  Ciprofloxacin, Sulfa antibiotics, Varicella virus vaccine live, and Zoster vaccine live        PHYSICAL EXAM    I have reviewed the triage vital signs and nursing notes.    ED Triage Vitals   Temp Heart Rate Resp BP SpO2   03/21/25 2103 03/21/25 2103 03/21/25 2103 03/21/25 2110 03/21/25 2103   97 °F (36.1 °C) (!) 124 18 170/80 98 %      Temp src Heart Rate Source Patient Position BP Location FiO2 (%)   03/21/25 2103 03/21/25 2103 -- -- --   Tympanic Monitor          Physical Exam  Constitutional:       Appearance: Normal appearance.   HENT:      Head: Normocephalic and atraumatic.   Eyes:      Pupils: Pupils are equal, round, and reactive to light.   Cardiovascular:      Rate and Rhythm: Normal rate and regular rhythm.      Heart sounds: Murmur heard.   Abdominal:      Tenderness: There is abdominal tenderness in the epigastric area. There is no guarding or rebound.   Skin:     General: Skin is warm.   Neurological:      Mental Status: She is alert and oriented to person, place,  and time.       My independent interpretation of the EKG: Sinus tachycardia, nonspecific T wave changes, not significantly changed from previous    LAB RESULTS  Recent Results (from the past 24 hours)   ECG 12 Lead ED Triage Standing Order; Abdominal Pain (Upper)    Collection Time: 03/21/25  9:07 PM   Result Value Ref Range    QT Interval 363 ms    QTC Interval 475 ms   Comprehensive Metabolic Panel    Collection Time: 03/21/25  9:16 PM    Specimen: Blood   Result Value Ref Range    Glucose 122 (H) 65 - 99 mg/dL    BUN 10 8 - 23 mg/dL    Creatinine 0.99 0.57 - 1.00 mg/dL    Sodium 137 136 - 145 mmol/L    Potassium 3.7 3.5 - 5.2 mmol/L    Chloride 102 98 - 107 mmol/L    CO2 20.5 (L) 22.0 - 29.0 mmol/L    Calcium 9.2 8.6 - 10.5 mg/dL    Total Protein 7.4 6.0 - 8.5 g/dL    Albumin 4.2 3.5 - 5.2 g/dL    ALT (SGPT) 12 1 - 33 U/L    AST (SGOT) 17 1 - 32 U/L    Alkaline Phosphatase 127 (H) 39 - 117 U/L    Total Bilirubin 0.3 0.0 - 1.2 mg/dL    Globulin 3.2 gm/dL    A/G Ratio 1.3 g/dL    BUN/Creatinine Ratio 10.1 7.0 - 25.0    Anion Gap 14.5 5.0 - 15.0 mmol/L    eGFR 57.4 (L) >60.0 mL/min/1.73   Lipase    Collection Time: 03/21/25  9:16 PM    Specimen: Blood   Result Value Ref Range    Lipase 35 13 - 60 U/L   High Sensitivity Troponin T    Collection Time: 03/21/25  9:16 PM    Specimen: Blood   Result Value Ref Range    HS Troponin T <6 <14 ng/L   Green Top (Gel)    Collection Time: 03/21/25  9:16 PM   Result Value Ref Range    Extra Tube Hold for add-ons.    Lavender Top    Collection Time: 03/21/25  9:16 PM   Result Value Ref Range    Extra Tube hold for add-on    Gold Top - SST    Collection Time: 03/21/25  9:16 PM   Result Value Ref Range    Extra Tube Hold for add-ons.    Light Blue Top    Collection Time: 03/21/25  9:16 PM   Result Value Ref Range    Extra Tube Hold for add-ons.    CBC Auto Differential    Collection Time: 03/21/25  9:16 PM    Specimen: Blood   Result Value Ref Range    WBC 7.54 3.40 - 10.80 10*3/mm3     RBC 4.47 3.77 - 5.28 10*6/mm3    Hemoglobin 13.5 12.0 - 15.9 g/dL    Hematocrit 39.9 34.0 - 46.6 %    MCV 89.3 79.0 - 97.0 fL    MCH 30.2 26.6 - 33.0 pg    MCHC 33.8 31.5 - 35.7 g/dL    RDW 11.7 (L) 12.3 - 15.4 %    RDW-SD 37.5 37.0 - 54.0 fl    MPV 8.9 6.0 - 12.0 fL    Platelets 254 140 - 450 10*3/mm3    Neutrophil % 62.4 42.7 - 76.0 %    Lymphocyte % 26.5 19.6 - 45.3 %    Monocyte % 8.4 5.0 - 12.0 %    Eosinophil % 1.7 0.3 - 6.2 %    Basophil % 0.7 0.0 - 1.5 %    Immature Grans % 0.3 0.0 - 0.5 %    Neutrophils, Absolute 4.71 1.70 - 7.00 10*3/mm3    Lymphocytes, Absolute 2.00 0.70 - 3.10 10*3/mm3    Monocytes, Absolute 0.63 0.10 - 0.90 10*3/mm3    Eosinophils, Absolute 0.13 0.00 - 0.40 10*3/mm3    Basophils, Absolute 0.05 0.00 - 0.20 10*3/mm3    Immature Grans, Absolute 0.02 0.00 - 0.05 10*3/mm3    nRBC 0.0 0.0 - 0.2 /100 WBC   High Sensitivity Troponin T 1Hr    Collection Time: 03/21/25 10:46 PM    Specimen: Arm, Right; Blood   Result Value Ref Range    HS Troponin T 7 <14 ng/L    Troponin T Numeric Delta     Urinalysis With Microscopic If Indicated (No Culture) - Urine, Clean Catch    Collection Time: 03/21/25 11:44 PM    Specimen: Urine, Clean Catch   Result Value Ref Range    Color, UA Yellow Yellow, Straw    Appearance, UA Clear Clear    pH, UA 6.5 5.0 - 8.0    Specific Gravity, UA 1.025 1.005 - 1.030    Glucose, UA Negative Negative    Ketones, UA Negative Negative    Bilirubin, UA Negative Negative    Blood, UA Negative Negative    Protein, UA Negative Negative    Leuk Esterase, UA Moderate (2+) (A) Negative    Nitrite, UA Negative Negative    Urobilinogen, UA 0.2 E.U./dL 0.2 - 1.0 E.U./dL   Urinalysis, Microscopic Only - Urine, Clean Catch    Collection Time: 03/21/25 11:44 PM    Specimen: Urine, Clean Catch   Result Value Ref Range    RBC, UA 0-2 None Seen, 0-2 /HPF    WBC, UA 3-5 (A) None Seen, 0-2 /HPF    Bacteria, UA None Seen None Seen /HPF    Squamous Epithelial Cells, UA 0-2 None Seen, 0-2 /HPF     Hyaline Casts, UA None Seen None Seen /LPF    Methodology Automated Microscopy          RADIOLOGY  CT Abdomen Pelvis With Contrast  CT Abdomen Pelvis With Contrast, CT Angiogram Chest  Result Date: 3/21/2025  CTA CHEST, CT ABDOMEN AND PELVIS  HISTORY: Tachycardia, epigastric and chest pain  COMPARISON: Chest CT  November 9, 2021  TECHNIQUE: CT angiography was performed of the chest with axial images as well as coronal and sagittal reformatted MIP images provided following the administration of IV contrast.  3-D surface rendered reformats were obtained of the pulmonary arteries and aorta.  CT of the abdomen and pelvis obtained following administration of IV contrast. Coronal and sagittal reconstructions were obtained.   Radiation dose reduction techniques were utilized, including automated exposure control, and exposure modulation based on body size.  FINDINGS:  Chest CTA: There is no acute pulmonary infiltrate, pleural effusion, or pneumothorax. Multiple pulmonary nodules are redemonstrated, all increased in size when compared to the prior chest CT. A right lower lobe perihilar nodule measures 1.7 cm in size,, compared to 8 mm previously. A right middle lobe nodule along the major fissure currently measures 2.0 cm, compared to 1.6 cm previously, and a lingular nodule measures 1.3 x 2.7 cm, compared to 1.2 x 0.9 cm previously..  The thoracic aorta is normal in caliber, and there is no evidence of dissection there are coronary atherosclerotic vascular calcifications.. There is no suspicious mediastinal adenopathy or other mass.  Bolus timing is good and there is no evidence of pulmonary embolism.  Abdomen: The gallbladder has been removed, and the liver appears otherwise normal.  The spleen and pancreas appear normal. A right adrenal nodule is unchanged since the prior study. The left adrenal gland is normal. Both kidneys are normal.  The aorta is normal in caliber.   There is no abdominal or retroperitoneal  adenopathy or other mass. There is no abdominal or retroperitoneal inflammatory change, or abnormal fluid or air collection.  There is no evidence of bowel obstruction.   Pelvis:  The appendix is clearly identified, and is normal.  There is extensive sigmoid diverticulosis, but there is no CT evidence of diverticulitis. There is no pelvic inflammatory change or abnormal fluid or air collection. There is no pelvic adenopathy or other soft tissue mass.  There is no CT evidence of hernia or bowel obstruction.  There is no acute bony abnormality.       1.  Pulmonary arteries are well-opacified, and there is no evidence of pulmonary embolism.  2.  No acute pulmonary parenchymal abnormality is seen. However, multiple noncalcified pulmonary nodules show interval growth since the previous examination, suspicious for growing neoplasm.  3.   CT abdomen pelvis shows no convincing evidence of acute abnormality. Sigmoid diverticulosis without evidence of acute diverticulitis.      This report was finalized on 3/21/2025 10:53 PM by Dr. Piyush Broderick M.D on Workstation: [id number]          MEDICATIONS GIVEN IN ER  Medications   sodium chloride 0.9 % flush 10 mL (has no administration in time range)   iopamidol (ISOVUE-370) 76 % injection 100 mL (85 mL Intravenous Given 3/21/25 2210)         ORDERS PLACED DURING THIS VISIT:  Orders Placed This Encounter   Procedures    CT Abdomen Pelvis With Contrast    CT Angiogram Chest    [place] Draw    Comprehensive Metabolic Panel    Lipase    High Sensitivity Troponin T    Urinalysis With Microscopic If Indicated (No Culture) - Urine, Clean Catch    CBC Auto Differential    High Sensitivity Troponin T 1Hr    Urinalysis, Microscopic Only - Urine, Clean Catch    NPO Diet NPO Type: Strict NPO    Undress & Gown    Continuous Pulse Oximetry    Vital Signs    Oxygen Therapy- Nasal Cannula; Titrate 1-6 LPM Per SpO2; 90 - 95%    ECG 12 Lead ED Triage Standing Order; Abdominal Pain (Upper)     Insert Peripheral IV    CBC & Differential    Green Top (Gel)    Lavender Top    Gold Top - SST    Light Blue Top         OUTPATIENT MEDICATION MANAGEMENT:  Current Facility-Administered Medications Ordered in Epic   Medication Dose Route Frequency Provider Last Rate Last Admin    sodium chloride 0.9 % flush 10 mL  10 mL Intravenous PRN Jodie Dobbins MD         Current Outpatient Medications Ordered in Epic   Medication Sig Dispense Refill    albuterol sulfate  (90 Base) MCG/ACT inhaler Inhale 2 puffs Every 4 (Four) Hours As Needed for Wheezing or Shortness of Air. 8 g 0    azithromycin (Zithromax Z-Luis A) 250 MG tablet Take 2 tablets by mouth on day 1, then 1 tablet daily on days 2-5 6 tablet 0    Calcium Carbonate-Vit D-Min (CALTRATE 600+D PLUS MINERALS) 600-800 MG-UNIT chewable tablet Chew 1 tablet Daily.      Cholecalciferol (VITAMIN D3) 1000 units capsule Take  by mouth.      fluticasone (FLONASE) 50 MCG/ACT nasal spray USE 2 SPRAYS NASALLY EVERY DAY AS DIRECTED BY PROVIDER 48 g 2    levothyroxine (SYNTHROID, LEVOTHROID) 137 MCG tablet Take 1 tablet by mouth Daily. Dr. Horton is controlling      lisinopril (PRINIVIL,ZESTRIL) 10 MG tablet TAKE 1 TABLET EVERY DAY 90 tablet 3    omeprazole (priLOSEC) 20 MG capsule TAKE 1 CAPSULE EVERY DAY 90 capsule 3    triamcinolone (KENALOG) 0.1 % cream Apply 1 application  topically to the appropriate area as directed 2 (Two) Times a Day. 45 g 0    vitamin E 400 UNIT capsule Take 1 capsule by mouth Daily.             PROGRESS, DATA ANALYSIS, CONSULTS, AND MEDICAL DECISION MAKING  All labs have been independently interpreted by me.  All radiology studies have been reviewed by me. All EKG's have been independently viewed and interpreted by me.  Discussion below represents my analysis of pertinent findings related to patient's condition, differential diagnosis, treatment plan and final disposition.    Differential diagnosis includes but is not limited to pulmonary  embolism, acute coronary syndrome, pneumothorax, pneumonia, gastritis, GERD, pancreatitis.    Clinical Scores:                                 This is an overall well-appearing 79-year-old female who is presenting today secondary to epigastric pain.  Did have some radiation to the shoulders and upper back.  Symptoms have overall improved since initial onset.    Patient was evaluated with an EKG.  This did not demonstrate any evidence of acute ischemia or arrhythmia.  She has had 2 times negative troponin levels and symptoms are not suspicious for ACS.    Given the atypical symptoms, the patient was further evaluated with CT imaging of the chest, abdomen and pelvis.  This demonstrated known pulmonary nodules which are secondary to her thyroid cancer,, but no other acute findings in the abdomen or pelvis.    She was evaluated with labs, which are benign.         12:23 AM EDT: I saw and reevaluated the patient.  She says she is feeling very well and asymptomatic.  I discussed her results.  She says if I do not see anything on the CT scan, she thinks it was anxiety and she would otherwise like to go home.  I offered admission for further cardiac evaluation, but she declines.  She is aware of the neoplastic changes in her chest and is following up with Dr. Schultz with oncology for this.        AS OF 00:24 EDT VITALS:    BP - 157/79  HR - 80  TEMP - 97 °F (36.1 °C) (Tympanic)  O2 SATS - 98%    COMPLEXITY OF CARE  Admission was considered but after careful review of the patient's presentation, physical examination, diagnostic results, and response to treatment the patient may be safely discharged with outpatient follow-up.      DIAGNOSIS  Final diagnoses:   Epigastric pain         DISPOSITION  ED Disposition       ED Disposition   Discharge    Condition   Stable    Comment   --                Please note that portions of this document were completed with a voice recognition program.    Note Disclaimer: At [facility], we  believe that sharing information builds trust and better relationships. You are receiving this note because you recently visited [facility]. It is possible you will see health information before a provider has talked with you about it. This kind of information can be easy to misunderstand. To help you fully understand what it means for your health, we urge you to discuss this note with your provider.         Jodie Dobbins MD  03/22/25 0029

## 2025-03-25 ENCOUNTER — OFFICE VISIT (OUTPATIENT)
Dept: FAMILY MEDICINE CLINIC | Facility: CLINIC | Age: 80
End: 2025-03-25
Payer: MEDICARE

## 2025-03-25 VITALS
DIASTOLIC BLOOD PRESSURE: 72 MMHG | WEIGHT: 158 LBS | RESPIRATION RATE: 18 BRPM | SYSTOLIC BLOOD PRESSURE: 140 MMHG | HEIGHT: 65 IN | OXYGEN SATURATION: 98 % | HEART RATE: 86 BPM | BODY MASS INDEX: 26.33 KG/M2

## 2025-03-25 DIAGNOSIS — I10 PRIMARY HYPERTENSION: ICD-10-CM

## 2025-03-25 DIAGNOSIS — F41.9 ANXIETY: ICD-10-CM

## 2025-03-25 DIAGNOSIS — C78.01 SECONDARY MALIGNANT NEOPLASM OF RIGHT LUNG: ICD-10-CM

## 2025-03-25 DIAGNOSIS — R10.13 EPIGASTRIC ABDOMINAL PAIN: Primary | ICD-10-CM

## 2025-03-25 PROBLEM — B34.8 PARAINFLUENZA VIRUS INFECTION: Status: ACTIVE | Noted: 2024-07-06

## 2025-03-25 PROBLEM — K21.9 GERD (GASTROESOPHAGEAL REFLUX DISEASE): Chronic | Status: ACTIVE | Noted: 2024-07-06

## 2025-03-25 PROBLEM — C78.02 SECONDARY MALIGNANT NEOPLASM OF LEFT LUNG: Status: ACTIVE | Noted: 2025-01-25

## 2025-03-25 PROBLEM — J39.8 TRACHEAL MASS: Status: ACTIVE | Noted: 2024-07-06

## 2025-03-25 PROBLEM — R06.1 STRIDOR: Status: ACTIVE | Noted: 2024-07-06

## 2025-03-25 PROBLEM — R04.2 HEMOPTYSIS: Status: ACTIVE | Noted: 2024-07-06

## 2025-03-25 PROCEDURE — 3078F DIAST BP <80 MM HG: CPT | Performed by: NURSE PRACTITIONER

## 2025-03-25 PROCEDURE — 1159F MED LIST DOCD IN RCRD: CPT | Performed by: NURSE PRACTITIONER

## 2025-03-25 PROCEDURE — 1160F RVW MEDS BY RX/DR IN RCRD: CPT | Performed by: NURSE PRACTITIONER

## 2025-03-25 PROCEDURE — 3077F SYST BP >= 140 MM HG: CPT | Performed by: NURSE PRACTITIONER

## 2025-03-25 PROCEDURE — 1125F AMNT PAIN NOTED PAIN PRSNT: CPT | Performed by: NURSE PRACTITIONER

## 2025-03-25 PROCEDURE — 99214 OFFICE O/P EST MOD 30 MIN: CPT | Performed by: NURSE PRACTITIONER

## 2025-03-25 RX ORDER — HYDROCODONE/ACETAMINOPHEN 5 MG-500MG
6 TABLET ORAL DAILY
COMMUNITY

## 2025-03-25 NOTE — PROGRESS NOTES
Subjective   Gloria Álvarez is a 79 y.o. female.     Chief Complaint   Patient presents with    Abdominal Pain     Er f/u        History of Present Illness     History of Present Illness  The patient presents for evaluation of epigastric pain, hypertension, and anxiety.    She reports experiencing a burning sensation in her upper back, which has been persistent for some time. She has undergone various cardiac tests, all of which have returned normal results. She is not experiencing any abdominal pain. She does not report any associated rash. She speculates that her symptoms may be due to an allergic reaction to Ensure with soy, which she has been consuming. She has communicated with Dr. Phillips regarding a recent CT scan that included her lungs. She has a scheduled CT scan and throat scope with her ENT specialist next month. She also mentions that Dr. Phillips plans to conduct a PET scan every 4 to 6 months, and he has informed her that her condition is stable. She finds relief from her symptoms through warm baths.    She expresses concern about her blood pressure, which she believes needs to be managed. She recalls an incident on Friday night when she became extremely upset, leading to a blood pressure reading of 172 systolic. She attributes this episode to an anxiety attack. She sought emergency care, where her blood pressure was found to be elevated. An EKG, CT scan with contrast, and urine test were performed, all of which were normal. She continues to take lisinopril 10 mg for her blood pressure.    She reports feeling anxious and tense due to her 's recent open-heart surgery and the current political climate. She is interested in exploring meditation as a potential coping strategy. She has previously tried yoga classes but did not find them beneficial. She used to attend gym classes four days a week before the COVID-19 pandemic.    Supplemental Information  She has a history of thyroid cancer, which  has metastasized into nodules. She is under the care of Dr. Guan for immunotherapy. She also has a nodule on her esophagus, for which she received targeted radiation in 07/2024. She has been diagnosed with papillary thyroid cancer since 2012. She has undergone bronchoscopy and 20 sessions of targeted radiation with Dr. Hagan at [facility].    MEDICATIONS  Current: lisinopril     The following portions of the patient's history were reviewed and updated as appropriate: allergies, current medications, past family history, past medical history, past social history, past surgical history and problem list.    Review of Systems   Constitutional:  Negative for chills, fatigue and fever.   Respiratory:  Positive for shortness of breath. Negative for cough, chest tightness and wheezing.    Cardiovascular:  Negative for chest pain, palpitations and leg swelling.   Gastrointestinal:  Positive for abdominal pain (epigastric).   Neurological:  Negative for dizziness and headache.   Hematological: Negative.    Psychiatric/Behavioral: Negative.  Negative for sleep disturbance.        Objective   Physical Exam  Vitals and nursing note reviewed.   Constitutional:       Appearance: Normal appearance. She is well-developed and normal weight.   HENT:      Head: Normocephalic and atraumatic.   Eyes:      Conjunctiva/sclera: Conjunctivae normal.      Pupils: Pupils are equal, round, and reactive to light.   Cardiovascular:      Rate and Rhythm: Normal rate and regular rhythm.      Heart sounds: Normal heart sounds. No murmur heard.  Pulmonary:      Effort: Pulmonary effort is normal.      Breath sounds: Normal breath sounds. No decreased breath sounds, wheezing, rhonchi or rales.   Abdominal:      General: There is no distension.      Palpations: There is no mass.      Tenderness: There is no abdominal tenderness. There is no right CVA tenderness, left CVA tenderness or guarding.      Hernia: No hernia is present.   Musculoskeletal:         Arms:       Right lower leg: No edema.      Left lower leg: No edema.      Comments: Burning/pain in area marked just below left scapula. No erythema or rash noted.    Neurological:      Mental Status: She is alert and oriented to person, place, and time.   Psychiatric:         Behavior: Behavior normal.         Thought Content: Thought content normal.         Judgment: Judgment normal.         Vitals:    03/25/25 1009   BP: 140/72   Pulse: 86   Resp: 18   SpO2: 98%     Body mass index is 26.7 kg/m².      Procedures    Assessment & Plan   Problems Addressed this Visit       Secondary malignant neoplasm of right lung     Other Visit Diagnoses         Epigastric abdominal pain    -  Primary      Anxiety          Primary hypertension              Diagnoses         Codes Comments      Epigastric abdominal pain    -  Primary ICD-10-CM: R10.13  ICD-9-CM: 789.06       Secondary malignant neoplasm of right lung     ICD-10-CM: C78.01  ICD-9-CM: 197.0       Anxiety     ICD-10-CM: F41.9  ICD-9-CM: 300.00       Primary hypertension     ICD-10-CM: I10  ICD-9-CM: 401.9             Assessment & Plan  1. Epigastric pain.  The patient reports a burning sensation in the upper back area, which has been persistent for quite some time. There is no visible rash or other skin abnormalities. Differential diagnosis includes muscular pain or anxiety-related tension. She is advised to engage in outdoor activities such as walking, reading, and mindfulness meditation to alleviate stress. Yoga is also recommended for relaxation and muscle tension relief.    2. Hypertension.  Her blood pressure has consistently been recorded at 140 or above since January 2025. She experienced a spike in blood pressure to 172 systolic during an anxiety attack. She is currently taking lisinopril 10 mg daily. She is advised to continue monitoring her blood pressure regularly and maintain her current medication regimen.    3. Anxiety.  She reports significant  anxiety related to her 's recent open-heart surgery and current political events. She is advised to avoid watching distressing news, engage in relaxing activities such as reading, listening to soothing music, and practicing mindfulness meditation. Yoga is also recommended to help manage anxiety.    PROCEDURE  The patient underwent bronchoscopy and 20 sessions of targeted radiation with Dr. Hagan at [place].              Return if symptoms worsen or fail to improve.    Patient or patient representative verbalized consent for the use of Ambient Listening during the visit with  AMADEO Ahumada for chart documentation. 3/25/2025  10:24 EDT

## 2025-05-12 ENCOUNTER — TELEPHONE (OUTPATIENT)
Dept: FAMILY MEDICINE CLINIC | Facility: CLINIC | Age: 80
End: 2025-05-12

## 2025-05-12 NOTE — TELEPHONE ENCOUNTER
Spoke with patient, relayed quarantine information per CDC guidelines. Patient asking to reschedule appointment. Symptoms started on 5/10/25, OK to come in office on 5/21/25 when scheduled with Dr. Holden for her AWV. Patient verbalized an understanding of this information.

## 2025-05-12 NOTE — TELEPHONE ENCOUNTER
Patient was schedule with Dr. Holden for Wednesday for a medicare wellness. Was dx with Covid yesterday at an [facility]. Asking to speak to someone regarding how long to quarantine.  Call back number. [phone]

## 2025-05-20 NOTE — PROGRESS NOTES
"Subjective   The ABCs of the Annual Wellness Visit  Medicare Wellness Visit    Gloria Álvarez is a 79 y.o. patient who presents for a Medicare Wellness Visit.    Compared to one year ago, the patient feels her physical health is worse due to cancer recurrence and her mental health is the same or a little worse with her 's health issues.  Recent Hospitalizations:  She was admitted within the past 365 days at [facility].   Current Medical Providers:  Patient Care Team:  Jess Holden MD as PCP - General (Family Medicine)  Scott Hagan MD Oncology  Roge Schultz MD Oncology  No opioid medication identified on active medication list. I have reviewed chart for other potential  high risk medication/s and harmful drug interactions in the elderly.  Aspirin is not on active medication list.  Aspirin use is not indicated based on review of current medical condition/s. Risk of harm outweighs potential benefits.  .  Advance Care Planning Advance Directive is on file.  ACP discussion was held with the patient during this visit. Patient has an advance directive in EMR which is still valid.       Objective   Vitals:    05/21/25 1017   BP: 130/62   Pulse: 66   Resp: 18   SpO2: 99%   Weight: 71.7 kg (158 lb)   Height: 163.8 cm (64.5\")   PainSc: 0-No pain       Estimated body mass index is 26.7 kg/m² as calculated from the following:    Height as of this encounter: 163.8 cm (64.5\").    Weight as of this encounter: 71.7 kg (158 lb).    Does the patient have evidence of cognitive impairment? No  Lab Results   Component Value Date    CHLPL 162 05/06/2025    TRIG 119 05/06/2025    HDL 55 05/06/2025    LDL 86 05/06/2025    VLDL 21 05/06/2025                                                                                                Health  Risk Assessment    Smoking Status:  Social History     Tobacco Use   Smoking Status Never   Smokeless Tobacco Never     Alcohol Consumption:  Social History     Substance and " Sexual Activity   Alcohol Use Never       Fall Risk Navigator Hyper[employer]  TORIEJOCELYNE Fall Risk Assessment was completed, and patient is at LOW risk for falls.Assessment completed on:2025    Depression Screenin/21/2025    10:18 AM   PHQ-2/PHQ-9 Depression Screening   Little interest or pleasure in doing things Not at all   Feeling down, depressed, or hopeless Not at all     Health Habits and Functional and Cognitive Screenin/19/2025    12:00 PM   Functional & Cognitive Status   Do you have difficulty preparing food and eating? No   Do you have difficulty bathing yourself, getting dressed or grooming yourself? No   Do you have difficulty using the toilet? No   Do you have difficulty moving around from place to place? No   Do you have trouble with steps or getting out of a bed or a chair? No   Current Diet Well Balanced Diet   Dental Exam Up to date   Eye Exam Up to date   Exercise (times per week) 3 times per week   Current Exercises Include Treadmill;Walking   Do you need help using the phone?  No   Are you deaf or do you have serious difficulty hearing?  No   Do you need help to go to places out of walking distance? No   Do you need help shopping? No   Do you need help preparing meals?  No   Do you need help with housework?  No   Do you need help with laundry? No   Do you need help taking your medications? No   Do you need help managing money? No   Do you ever drive or ride in a car without wearing a seat belt? No   Have you felt unusual stress, anger or loneliness in the last month? No   Who do you live with? Spouse   If you need help, do you have trouble finding someone available to you? No   Have you been bothered in the last four weeks by sexual problems? No   Do you have difficulty concentrating, remembering or making decisions? No             Age-appropriate Screening Schedule:  Orders for future screening recommendations based on patient's age, sex and/or medical conditions are listed in the  plan section. The patient has been provided with a written plan.  ___________________________________________                                                                                                                                           CMS Preventative Services Quick Reference  Risk Factors Identified During Encounter  None Identified    The above risks/problems have been discussed with the patient.  Pertinent information has been shared with the patient in the After Visit Summary.  An After Visit Summary and PPPS were made available to the patient.    Follow Up:   Next Medicare Wellness visit to be scheduled in 1 year.       Additional E&M Note during same encounter follows:  Patient has additional, significant, and separately identifiable condition(s)/problem(s) that require work above and beyond the Medicare Wellness Visit          Assessment & Plan  Tracheal nodule/papillary carcinoma of thyroid.  The patient's tracheal nodule has decreased in size, as confirmed by the last CAT scan. She underwent targeted radiation therapy in July and August 2024, completing 20 treatments. Continued monitoring of the nodule with follow-up imaging as needed w/her oncologist.    Anxiety.  Experienced an anxiety attack and visited the ER following her 's open-heart surgery. Reports feeling better now. Discussed the impact of her 's health on her anxiety levels. No new medications or therapies prescribed at this time.    Health Maintenance.  Mammogram and bone density test were canceled due to a COVID-19 infection. Advised to reschedule these tests. Tests will be [facility].          Essential hypertension  BP is controlled well. She will recheck in six months. She will continue present meds. Prescription is sent today. .             Gloria is a 79 y.o. being seen today for  HTN  HISTORY    HPI     She reports that her tracheal nodule has shown a decrease in size, as confirmed by her  most recent CT scan. She underwent targeted radiation therapy in 07/2024 and 08/2024, receiving a total of 20 treatments over a 5-week period. She had a follow-up with Maria L after her ER visit.    She experienced an anxiety attack following her 's open-heart surgery, which necessitated a visit to the emergency room. Her  had surgery on 02/05/2025, where significant blockages were found and treated. She was tested positive for COVID-19 at an urgent care facility on 05/11/2025 but has since recovered and tested negative.    She had to cancel her mammogram due to jonas COVID-19 and plans to reschedule it. She did not have a bone density test scheduled.    Social History  She  reports that she has never smoked. She has never used smokeless tobacco. She reports that she does not drink alcohol and does not use drugs.  EXAM DATA    Vital Signs        BP Readings from Last 1 Encounters:   05/21/25 130/62     Wt Readings from Last 3 Encounters:   05/21/25 71.7 kg (158 lb)   05/11/25 70.8 kg (156 lb)   04/28/25 70.3 kg (155 lb)   Body mass index is 26.7 kg/m².  Physical Exam  Vitals reviewed.   Constitutional:       Appearance: Normal appearance. She is well-developed.   Cardiovascular:      Rate and Rhythm: Normal rate and regular rhythm.      Heart sounds: Normal heart sounds.   Pulmonary:      Effort: Pulmonary effort is normal.      Breath sounds: Normal breath sounds.   Neurological:      Mental Status: She is alert.   Psychiatric:         Behavior: Behavior normal.         Thought Content: Thought content normal.         Judgment: Judgment normal.             Patient or patient representative verbalized consent for the use of Ambient Listening during the visit with  Jess Holden MD for chart documentation. 5/21/2025  11:05 EDT

## 2025-05-21 ENCOUNTER — OFFICE VISIT (OUTPATIENT)
Dept: FAMILY MEDICINE CLINIC | Facility: CLINIC | Age: 80
End: 2025-05-21
Payer: MEDICARE

## 2025-05-21 VITALS
OXYGEN SATURATION: 99 % | WEIGHT: 158 LBS | BODY MASS INDEX: 26.33 KG/M2 | SYSTOLIC BLOOD PRESSURE: 130 MMHG | RESPIRATION RATE: 18 BRPM | HEART RATE: 66 BPM | DIASTOLIC BLOOD PRESSURE: 62 MMHG | HEIGHT: 65 IN

## 2025-05-21 DIAGNOSIS — I10 ESSENTIAL HYPERTENSION: Primary | ICD-10-CM

## 2025-05-21 DIAGNOSIS — Z12.31 ENCOUNTER FOR SCREENING MAMMOGRAM FOR BREAST CANCER: ICD-10-CM

## 2025-05-21 DIAGNOSIS — Z78.0 POSTMENOPAUSAL: ICD-10-CM

## 2025-05-21 RX ORDER — LISINOPRIL 10 MG/1
10 TABLET ORAL DAILY
Qty: 90 TABLET | Refills: 1 | Status: SHIPPED | OUTPATIENT
Start: 2025-05-21

## 2025-06-11 ENCOUNTER — HOSPITAL ENCOUNTER (OUTPATIENT)
Dept: PET IMAGING | Facility: HOSPITAL | Age: 80
Discharge: HOME | End: 2025-06-11
Admitting: FAMILY MEDICINE
Payer: MEDICARE

## 2025-06-11 ENCOUNTER — APPOINTMENT (OUTPATIENT)
Dept: WOMENS IMAGING | Facility: HOSPITAL | Age: 80
End: 2025-06-11
Payer: MEDICARE

## 2025-06-11 DIAGNOSIS — Z78.0 POSTMENOPAUSAL: ICD-10-CM

## 2025-06-11 PROCEDURE — 77067 SCR MAMMO BI INCL CAD: CPT | Performed by: RADIOLOGY

## 2025-06-11 PROCEDURE — 77063 BREAST TOMOSYNTHESIS BI: CPT | Performed by: RADIOLOGY

## 2025-06-11 PROCEDURE — 77080 DXA BONE DENSITY AXIAL: CPT

## 2025-07-14 ENCOUNTER — PATIENT MESSAGE (OUTPATIENT)
Dept: FAMILY MEDICINE CLINIC | Facility: CLINIC | Age: 80
End: 2025-07-14
Payer: MEDICARE

## 2025-07-14 DIAGNOSIS — M81.0 AGE-RELATED OSTEOPOROSIS WITHOUT CURRENT PATHOLOGICAL FRACTURE: Primary | ICD-10-CM

## 2025-07-18 RX ORDER — ALENDRONATE SODIUM 70 MG/1
70 TABLET ORAL
Qty: 13 TABLET | Refills: 3 | Status: SHIPPED | OUTPATIENT
Start: 2025-07-18

## 2025-07-22 DIAGNOSIS — I10 ESSENTIAL HYPERTENSION: ICD-10-CM

## 2025-07-22 RX ORDER — LISINOPRIL 10 MG/1
10 TABLET ORAL DAILY
Qty: 90 TABLET | Refills: 3 | Status: SHIPPED | OUTPATIENT
Start: 2025-07-22

## 2025-07-30 ENCOUNTER — TELEPHONE (OUTPATIENT)
Dept: FAMILY MEDICINE CLINIC | Facility: CLINIC | Age: 80
End: 2025-07-30
Payer: MEDICARE

## 2025-07-30 DIAGNOSIS — M81.0 AGE-RELATED OSTEOPOROSIS WITHOUT CURRENT PATHOLOGICAL FRACTURE: ICD-10-CM

## 2025-07-30 RX ORDER — ALENDRONATE SODIUM 70 MG/1
70 TABLET ORAL
Qty: 13 TABLET | Refills: 3 | Status: SHIPPED | OUTPATIENT
Start: 2025-07-30

## 2025-07-30 NOTE — TELEPHONE ENCOUNTER
Caller: Gloria Álvarez    Relationship: Self    Best call back number: [phone]    Requested Prescriptions:   Requested Prescriptions     Pending Prescriptions Disp Refills    alendronate (Fosamax) 70 MG tablet 13 tablet 3     Sig: Take 1 tablet by mouth Every 7 (Seven) Days.        Pharmacy where request should be sent: [facility] - [facility] [address] - [phone]  - [phone]     Last office visit with prescribing clinician: 5/21/2025   Last telemedicine visit with prescribing clinician: Visit date not found   Next office visit with prescribing clinician: Visit date not found     Additional details provided by patient: [facility] STATED THEY NEVER RECEIVED THIS.    PATIENT ALSO STATED THAT [facility] SAID IT NEEDED TO BE AUTHORIZED.    Does the patient have less than a 3 day supply:  [x] Yes  [] No    Would you like a call back once the refill request has been completed: [] Yes [x] No    If the office needs to give you a call back, can they leave a voicemail: [] Yes [x] No    Danielle Nunez Rep   07/30/25 08:35 EDT

## 2025-08-11 ENCOUNTER — OFFICE VISIT (OUTPATIENT)
Dept: INTERNAL MEDICINE | Facility: CLINIC | Age: 80
End: 2025-08-11
Payer: MEDICARE

## 2025-08-11 VITALS
DIASTOLIC BLOOD PRESSURE: 76 MMHG | HEIGHT: 65 IN | OXYGEN SATURATION: 96 % | SYSTOLIC BLOOD PRESSURE: 128 MMHG | WEIGHT: 162 LBS | RESPIRATION RATE: 19 BRPM | BODY MASS INDEX: 26.99 KG/M2 | HEART RATE: 94 BPM

## 2025-08-11 DIAGNOSIS — R20.2 NOTALGIA PARESTHETICA: ICD-10-CM

## 2025-08-11 DIAGNOSIS — Z76.89 ENCOUNTER TO ESTABLISH CARE: ICD-10-CM

## 2025-08-11 DIAGNOSIS — M81.0 OSTEOPOROSIS WITHOUT CURRENT PATHOLOGICAL FRACTURE, UNSPECIFIED OSTEOPOROSIS TYPE: ICD-10-CM

## 2025-08-11 DIAGNOSIS — L29.9 ITCHING: ICD-10-CM

## 2025-08-11 DIAGNOSIS — I10 ESSENTIAL HYPERTENSION: Primary | ICD-10-CM

## 2025-08-11 PROCEDURE — 1126F AMNT PAIN NOTED NONE PRSNT: CPT | Performed by: STUDENT IN AN ORGANIZED HEALTH CARE EDUCATION/TRAINING PROGRAM

## 2025-08-11 PROCEDURE — 1160F RVW MEDS BY RX/DR IN RCRD: CPT | Performed by: STUDENT IN AN ORGANIZED HEALTH CARE EDUCATION/TRAINING PROGRAM

## 2025-08-11 PROCEDURE — 1159F MED LIST DOCD IN RCRD: CPT | Performed by: STUDENT IN AN ORGANIZED HEALTH CARE EDUCATION/TRAINING PROGRAM

## 2025-08-11 PROCEDURE — 3074F SYST BP LT 130 MM HG: CPT | Performed by: STUDENT IN AN ORGANIZED HEALTH CARE EDUCATION/TRAINING PROGRAM

## 2025-08-11 PROCEDURE — 99213 OFFICE O/P EST LOW 20 MIN: CPT | Performed by: STUDENT IN AN ORGANIZED HEALTH CARE EDUCATION/TRAINING PROGRAM

## 2025-08-11 PROCEDURE — 3078F DIAST BP <80 MM HG: CPT | Performed by: STUDENT IN AN ORGANIZED HEALTH CARE EDUCATION/TRAINING PROGRAM

## (undated) DEVICE — SINGLE USE ASPIRATION NEEDLE: Brand: SINGLE USE ASPIRATION NEEDLE

## (undated) DEVICE — KT BRONCH NAV EDGE 90D EXT WO/ ADAPT

## (undated) DEVICE — DRSNG SURESITE WNDW 4X4.5

## (undated) DEVICE — GLV SURG SENSICARE W/ALOE PF LF 8 STRL

## (undated) DEVICE — GLV SURG SENSICARE W/ALOE PF LF 7.5 STRL

## (undated) DEVICE — CANNULA,ADULT,SOFT-TOUCH,7TUBE,SC: Brand: MEDLINE

## (undated) DEVICE — KT ORCA ORCAPOD DISP STRL

## (undated) DEVICE — SINGLE USE BIOPSY VALVE MAJ-210: Brand: SINGLE USE BIOPSY VALVE (STERILE)

## (undated) DEVICE — TRAP,MUCUS SPECIMEN, 80CC: Brand: MEDLINE

## (undated) DEVICE — STPLR SKIN VISISTAT WD 35CT

## (undated) DEVICE — MSK AIRWY LARYNG LMA PILOT SZ4

## (undated) DEVICE — PK ANT HIP 40

## (undated) DEVICE — ADAPT BRONCH EDGE FOR USE W/OLYMPUS SCOPE

## (undated) DEVICE — DRSNG BRDR MEPILEX P/OP SIL 4X8IN

## (undated) DEVICE — LN SMPL CO2 SHTRM SD STREAM W/M LUER

## (undated) DEVICE — PTCH SENSR INREACH PULM GUIDANCE

## (undated) DEVICE — TUBING, SUCTION, 1/4" X 10', STRAIGHT: Brand: MEDLINE

## (undated) DEVICE — Device: Brand: BALLOON

## (undated) DEVICE — SNAR POLYP CAPTIVATOR HEX 27MM 240CM

## (undated) DEVICE — ADAPT SWVL FIBROPTIC BRONCH

## (undated) DEVICE — VITAL SIGNS™ JACKSON-REES CIRCUITS: Brand: VITAL SIGNS™

## (undated) DEVICE — SUT VIC 0 CT1 36IN J946H

## (undated) DEVICE — THE CARR-LOCKE INJECTION NEEDLE IS A SINGLE USE, DISPOSABLE, FLEXIBLE SHEATH INJECTION NEEDLE USED FOR THE INJECTION OF VARIOUS TYPES OF MEDIA THROUGH FLEXIBLE ENDOSCOPES.

## (undated) DEVICE — ADAPT CLN BIOGUARD AIR/H2O DISP

## (undated) DEVICE — SUT PDS 1 CT1 36IN Z347H

## (undated) DEVICE — SENSR O2 OXIMAX FNGR A/ 18IN NONSTR

## (undated) DEVICE — ANTIBACTERIAL UNDYED BRAIDED (POLYGLACTIN 910), SYNTHETIC ABSORBABLE SUTURE: Brand: COATED VICRYL

## (undated) DEVICE — ENCORE® LATEX ORTHO SIZE 7.5, STERILE LATEX POWDER-FREE SURGICAL GLOVE: Brand: ENCORE

## (undated) DEVICE — BRSH CYTO INREACH SHEATH 1.8X2MM 130CM

## (undated) DEVICE — THE SINGLE USE ETRAP – POLYP TRAP IS USED FOR SUCTION RETRIEVAL OF ENDOSCOPICALLY REMOVED POLYPS.: Brand: ETRAP

## (undated) DEVICE — Device

## (undated) DEVICE — SINGLE USE SUCTION VALVE MAJ-209: Brand: SINGLE USE SUCTION VALVE (STERILE)

## (undated) DEVICE — GLV SURG SENSICARE GREEN W/ALOE PF LF 7 STRL

## (undated) DEVICE — THE TORRENT IRRIGATION SCOPE CONNECTOR IS USED WITH THE TORRENT IRRIGATION TUBING TO PROVIDE IRRIGATION FLUIDS SUCH AS STERILE WATER DURING GASTROINTESTINAL ENDOSCOPIC PROCEDURES WHEN USED IN CONJUNCTION WITH AN IRRIGATION PUMP (OR ELECTROSURGICAL UNIT).: Brand: TORRENT

## (undated) DEVICE — APPL DURAPREP IODOPHOR APL 26ML

## (undated) DEVICE — PREP SOL POVIDONE/IODINE BT 4OZ

## (undated) DEVICE — CANN O2 ETCO2 FITS ALL CONN CO2 SMPL A/ 7IN DISP LF

## (undated) DEVICE — GLV SURG SENSICARE MICRO PF LF 7 STRL

## (undated) DEVICE — DRSNG SURESITE123 2.4X2.8IN

## (undated) DEVICE — OCCLUSIVE GAUZE STRIP,3% BISMUTH TRIBROMOPHENATE IN PETROLATUM BLEND: Brand: XEROFORM

## (undated) DEVICE — SUT VICRYL 1 CT1 27IN  JJ40G

## (undated) DEVICE — LARGE BORE STOPCOCK WITH EXTENSION SET, MALE LUER LOCK ADAPTER WITH RETRACTABLE COLLAR

## (undated) DEVICE — FRCP BIOP SUPER TRAX 1.7MM 110CM

## (undated) DEVICE — MAT FLR ABSORBENT LG 4FT 10 2.5FT

## (undated) DEVICE — LN SMPL O2 NASL/ORL SMART/CAPNOLINE PLS A/

## (undated) DEVICE — PREMIUM WET SKIN PREP TRAY: Brand: MEDLINE INDUSTRIES, INC.

## (undated) DEVICE — Device: Brand: DEFENDO AIR/WATER/SUCTION AND BIOPSY VALVE

## (undated) DEVICE — BITEBLOCK OMNI BLOC

## (undated) DEVICE — MEDI-VAC YANKAUER SUCTION HANDLE W/BULBOUS TIP: Brand: CARDINAL HEALTH